# Patient Record
Sex: MALE | Race: WHITE | NOT HISPANIC OR LATINO | Employment: OTHER | URBAN - METROPOLITAN AREA
[De-identification: names, ages, dates, MRNs, and addresses within clinical notes are randomized per-mention and may not be internally consistent; named-entity substitution may affect disease eponyms.]

---

## 2017-01-01 ENCOUNTER — OFFICE VISIT (OUTPATIENT)
Dept: ONCOLOGY | Facility: CLINIC | Age: 82
End: 2017-01-01

## 2017-01-01 ENCOUNTER — INFUSION (OUTPATIENT)
Dept: ONCOLOGY | Facility: HOSPITAL | Age: 82
End: 2017-01-01

## 2017-01-01 ENCOUNTER — DOCUMENTATION (OUTPATIENT)
Dept: RADIATION ONCOLOGY | Facility: HOSPITAL | Age: 82
End: 2017-01-01

## 2017-01-01 ENCOUNTER — APPOINTMENT (OUTPATIENT)
Dept: ONCOLOGY | Facility: CLINIC | Age: 82
End: 2017-01-01

## 2017-01-01 ENCOUNTER — APPOINTMENT (OUTPATIENT)
Dept: GENERAL RADIOLOGY | Facility: HOSPITAL | Age: 82
End: 2017-01-01

## 2017-01-01 ENCOUNTER — RADIATION ONCOLOGY WEEKLY ASSESSMENT (OUTPATIENT)
Dept: RADIATION ONCOLOGY | Facility: HOSPITAL | Age: 82
End: 2017-01-01

## 2017-01-01 ENCOUNTER — HOSPITAL ENCOUNTER (OUTPATIENT)
Dept: MRI IMAGING | Facility: HOSPITAL | Age: 82
Discharge: HOME | End: 2017-05-21
Attending: INTERNAL MEDICINE | Admitting: INTERNAL MEDICINE

## 2017-01-01 ENCOUNTER — HOSPITAL ENCOUNTER (OUTPATIENT)
Dept: GENERAL RADIOLOGY | Facility: HOSPITAL | Age: 82
Discharge: HOME | End: 2017-05-21
Attending: INTERNAL MEDICINE

## 2017-01-01 ENCOUNTER — APPOINTMENT (OUTPATIENT)
Dept: LAB | Facility: HOSPITAL | Age: 82
End: 2017-01-01

## 2017-01-01 ENCOUNTER — APPOINTMENT (OUTPATIENT)
Dept: ONCOLOGY | Facility: HOSPITAL | Age: 82
End: 2017-01-01

## 2017-01-01 ENCOUNTER — APPOINTMENT (OUTPATIENT)
Dept: CT IMAGING | Facility: HOSPITAL | Age: 82
End: 2017-01-01

## 2017-01-01 ENCOUNTER — HOSPITAL ENCOUNTER (OUTPATIENT)
Dept: CT IMAGING | Facility: HOSPITAL | Age: 82
Discharge: HOME | End: 2017-03-17
Attending: INTERNAL MEDICINE

## 2017-01-01 ENCOUNTER — LAB (OUTPATIENT)
Dept: ONCOLOGY | Facility: HOSPITAL | Age: 82
End: 2017-01-01

## 2017-01-01 ENCOUNTER — HOSPITAL ENCOUNTER (OUTPATIENT)
Dept: MRI IMAGING | Facility: HOSPITAL | Age: 82
Discharge: HOME | End: 2017-01-24
Attending: RADIOLOGY | Admitting: RADIOLOGY

## 2017-01-01 ENCOUNTER — HOSPITAL ENCOUNTER (OUTPATIENT)
Dept: PET IMAGING | Facility: HOSPITAL | Age: 82
Discharge: HOME | End: 2017-05-22
Attending: INTERNAL MEDICINE | Admitting: INTERNAL MEDICINE

## 2017-01-01 ENCOUNTER — LAB (OUTPATIENT)
Dept: LAB | Facility: HOSPITAL | Age: 82
End: 2017-01-01

## 2017-01-01 ENCOUNTER — OFFICE VISIT (OUTPATIENT)
Dept: RADIATION ONCOLOGY | Facility: HOSPITAL | Age: 82
End: 2017-01-01

## 2017-01-01 ENCOUNTER — CLINICAL SUPPORT (OUTPATIENT)
Dept: ONCOLOGY | Facility: HOSPITAL | Age: 82
End: 2017-01-01

## 2017-01-01 ENCOUNTER — APPOINTMENT (OUTPATIENT)
Dept: RADIATION ONCOLOGY | Facility: HOSPITAL | Age: 82
End: 2017-01-01

## 2017-01-01 ENCOUNTER — HOSPITAL ENCOUNTER (OUTPATIENT)
Dept: CT IMAGING | Facility: HOSPITAL | Age: 82
Discharge: HOME | End: 2017-01-18
Attending: INTERNAL MEDICINE | Admitting: INTERNAL MEDICINE

## 2017-01-01 ENCOUNTER — HOSPITAL ENCOUNTER (OUTPATIENT)
Dept: MRI IMAGING | Facility: HOSPITAL | Age: 82
Discharge: HOME | End: 2017-03-17
Attending: INTERNAL MEDICINE | Admitting: INTERNAL MEDICINE

## 2017-01-01 ENCOUNTER — HOSPITAL ENCOUNTER (OUTPATIENT)
Dept: MRI IMAGING | Facility: HOSPITAL | Age: 82
Discharge: HOME | End: 2017-01-24
Attending: RADIOLOGY

## 2017-01-01 ENCOUNTER — DOCUMENTATION (OUTPATIENT)
Dept: ONCOLOGY | Facility: CLINIC | Age: 82
End: 2017-01-01

## 2017-01-01 ENCOUNTER — OFFICE VISIT (OUTPATIENT)
Dept: INTERNAL MEDICINE | Facility: CLINIC | Age: 82
End: 2017-01-01

## 2017-01-01 ENCOUNTER — APPOINTMENT (OUTPATIENT)
Dept: OTHER | Facility: HOSPITAL | Age: 82
End: 2017-01-01

## 2017-01-01 ENCOUNTER — HOSPITAL ENCOUNTER (INPATIENT)
Facility: HOSPITAL | Age: 82
LOS: 7 days | End: 2017-06-16
Attending: EMERGENCY MEDICINE | Admitting: INTERNAL MEDICINE

## 2017-01-01 ENCOUNTER — HOSPITAL ENCOUNTER (INPATIENT)
Facility: HOSPITAL | Age: 82
LOS: 4 days | Discharge: HOME | End: 2017-03-23
Attending: EMERGENCY MEDICINE | Admitting: INTERNAL MEDICINE

## 2017-01-01 ENCOUNTER — CONSULT (OUTPATIENT)
Dept: RADIATION ONCOLOGY | Facility: HOSPITAL | Age: 82
End: 2017-01-01

## 2017-01-01 ENCOUNTER — HOSPITAL ENCOUNTER (OUTPATIENT)
Dept: PET IMAGING | Facility: HOSPITAL | Age: 82
Discharge: HOME | End: 2017-05-22
Attending: INTERNAL MEDICINE

## 2017-01-01 ENCOUNTER — TELEPHONE (OUTPATIENT)
Dept: ONCOLOGY | Facility: CLINIC | Age: 82
End: 2017-01-01

## 2017-01-01 VITALS
HEART RATE: 110 BPM | TEMPERATURE: 97.8 F | BODY MASS INDEX: 33.45 KG/M2 | DIASTOLIC BLOOD PRESSURE: 72 MMHG | OXYGEN SATURATION: 92 % | SYSTOLIC BLOOD PRESSURE: 140 MMHG | HEIGHT: 75 IN | WEIGHT: 269 LBS

## 2017-01-01 VITALS
DIASTOLIC BLOOD PRESSURE: 72 MMHG | BODY MASS INDEX: 31.71 KG/M2 | WEIGHT: 255 LBS | HEART RATE: 116 BPM | HEIGHT: 75 IN | OXYGEN SATURATION: 88 % | RESPIRATION RATE: 20 BRPM | SYSTOLIC BLOOD PRESSURE: 140 MMHG | TEMPERATURE: 97.6 F

## 2017-01-01 VITALS
WEIGHT: 274.2 LBS | HEIGHT: 74 IN | BODY MASS INDEX: 35.19 KG/M2 | TEMPERATURE: 98 F | DIASTOLIC BLOOD PRESSURE: 69 MMHG | HEART RATE: 114 BPM | SYSTOLIC BLOOD PRESSURE: 137 MMHG

## 2017-01-01 VITALS
BODY MASS INDEX: 34.96 KG/M2 | SYSTOLIC BLOOD PRESSURE: 128 MMHG | HEART RATE: 82 BPM | RESPIRATION RATE: 16 BRPM | TEMPERATURE: 98.2 F | OXYGEN SATURATION: 96 % | WEIGHT: 272.4 LBS | DIASTOLIC BLOOD PRESSURE: 70 MMHG | HEIGHT: 74 IN

## 2017-01-01 VITALS
BODY MASS INDEX: 32.2 KG/M2 | HEART RATE: 96 BPM | SYSTOLIC BLOOD PRESSURE: 122 MMHG | HEART RATE: 100 BPM | DIASTOLIC BLOOD PRESSURE: 80 MMHG | DIASTOLIC BLOOD PRESSURE: 72 MMHG | SYSTOLIC BLOOD PRESSURE: 137 MMHG | HEIGHT: 75 IN | WEIGHT: 259 LBS | TEMPERATURE: 97.6 F | RESPIRATION RATE: 18 BRPM | TEMPERATURE: 97.7 F | OXYGEN SATURATION: 92 % | WEIGHT: 272.6 LBS | BODY MASS INDEX: 35 KG/M2

## 2017-01-01 VITALS
RESPIRATION RATE: 20 BRPM | OXYGEN SATURATION: 93 % | TEMPERATURE: 97.2 F | HEIGHT: 75 IN | SYSTOLIC BLOOD PRESSURE: 139 MMHG | DIASTOLIC BLOOD PRESSURE: 84 MMHG | WEIGHT: 267 LBS | HEART RATE: 121 BPM | BODY MASS INDEX: 33.2 KG/M2

## 2017-01-01 VITALS
RESPIRATION RATE: 14 BRPM | TEMPERATURE: 97.6 F | HEIGHT: 75 IN | SYSTOLIC BLOOD PRESSURE: 110 MMHG | DIASTOLIC BLOOD PRESSURE: 70 MMHG | OXYGEN SATURATION: 92 % | WEIGHT: 251.2 LBS | BODY MASS INDEX: 31.23 KG/M2 | HEART RATE: 97 BPM

## 2017-01-01 VITALS
DIASTOLIC BLOOD PRESSURE: 66 MMHG | RESPIRATION RATE: 18 BRPM | SYSTOLIC BLOOD PRESSURE: 118 MMHG | HEART RATE: 119 BPM | HEIGHT: 74 IN | WEIGHT: 250.2 LBS | OXYGEN SATURATION: 89 % | BODY MASS INDEX: 32.11 KG/M2 | TEMPERATURE: 97.9 F

## 2017-01-01 VITALS
TEMPERATURE: 98 F | HEIGHT: 74 IN | BODY MASS INDEX: 34.88 KG/M2 | OXYGEN SATURATION: 93 % | HEART RATE: 84 BPM | SYSTOLIC BLOOD PRESSURE: 120 MMHG | WEIGHT: 271.8 LBS | DIASTOLIC BLOOD PRESSURE: 78 MMHG | RESPIRATION RATE: 18 BRPM

## 2017-01-01 VITALS
WEIGHT: 275 LBS | BODY MASS INDEX: 35.31 KG/M2 | OXYGEN SATURATION: 94 % | TEMPERATURE: 96.7 F | DIASTOLIC BLOOD PRESSURE: 85 MMHG | SYSTOLIC BLOOD PRESSURE: 142 MMHG | HEART RATE: 91 BPM | RESPIRATION RATE: 16 BRPM

## 2017-01-01 VITALS
WEIGHT: 270.4 LBS | DIASTOLIC BLOOD PRESSURE: 82 MMHG | RESPIRATION RATE: 18 BRPM | OXYGEN SATURATION: 88 % | BODY MASS INDEX: 33.62 KG/M2 | HEART RATE: 76 BPM | HEIGHT: 75 IN | TEMPERATURE: 98 F | SYSTOLIC BLOOD PRESSURE: 140 MMHG

## 2017-01-01 VITALS
SYSTOLIC BLOOD PRESSURE: 128 MMHG | TEMPERATURE: 97.2 F | HEART RATE: 84 BPM | WEIGHT: 258 LBS | RESPIRATION RATE: 16 BRPM | BODY MASS INDEX: 32.25 KG/M2 | DIASTOLIC BLOOD PRESSURE: 64 MMHG

## 2017-01-01 VITALS
BODY MASS INDEX: 34.92 KG/M2 | DIASTOLIC BLOOD PRESSURE: 85 MMHG | OXYGEN SATURATION: 92 % | RESPIRATION RATE: 16 BRPM | SYSTOLIC BLOOD PRESSURE: 137 MMHG | HEART RATE: 84 BPM | WEIGHT: 272 LBS | TEMPERATURE: 96.6 F

## 2017-01-01 VITALS
DIASTOLIC BLOOD PRESSURE: 102 MMHG | RESPIRATION RATE: 16 BRPM | TEMPERATURE: 96.5 F | HEART RATE: 82 BPM | SYSTOLIC BLOOD PRESSURE: 154 MMHG | OXYGEN SATURATION: 90 %

## 2017-01-01 VITALS
HEART RATE: 110 BPM | BODY MASS INDEX: 35.33 KG/M2 | WEIGHT: 275.2 LBS | TEMPERATURE: 97.7 F | SYSTOLIC BLOOD PRESSURE: 127 MMHG | DIASTOLIC BLOOD PRESSURE: 72 MMHG

## 2017-01-01 VITALS
OXYGEN SATURATION: 97 % | BODY MASS INDEX: 34.25 KG/M2 | WEIGHT: 274 LBS | DIASTOLIC BLOOD PRESSURE: 87 MMHG | HEART RATE: 80 BPM | TEMPERATURE: 97.6 F | SYSTOLIC BLOOD PRESSURE: 136 MMHG

## 2017-01-01 VITALS
OXYGEN SATURATION: 91 % | TEMPERATURE: 96.8 F | SYSTOLIC BLOOD PRESSURE: 151 MMHG | DIASTOLIC BLOOD PRESSURE: 80 MMHG | RESPIRATION RATE: 16 BRPM | HEART RATE: 103 BPM

## 2017-01-01 VITALS
SYSTOLIC BLOOD PRESSURE: 105 MMHG | OXYGEN SATURATION: 94 % | HEART RATE: 94 BPM | DIASTOLIC BLOOD PRESSURE: 70 MMHG | RESPIRATION RATE: 20 BRPM | TEMPERATURE: 97.1 F

## 2017-01-01 VITALS
RESPIRATION RATE: 16 BRPM | SYSTOLIC BLOOD PRESSURE: 110 MMHG | DIASTOLIC BLOOD PRESSURE: 66 MMHG | WEIGHT: 274 LBS | HEIGHT: 75 IN | TEMPERATURE: 97.5 F | HEART RATE: 88 BPM | BODY MASS INDEX: 34.07 KG/M2 | OXYGEN SATURATION: 90 %

## 2017-01-01 VITALS
SYSTOLIC BLOOD PRESSURE: 164 MMHG | RESPIRATION RATE: 16 BRPM | DIASTOLIC BLOOD PRESSURE: 104 MMHG | TEMPERATURE: 96.4 F | HEART RATE: 115 BPM | OXYGEN SATURATION: 91 %

## 2017-01-01 VITALS — WEIGHT: 242 LBS | BODY MASS INDEX: 31.07 KG/M2

## 2017-01-01 DIAGNOSIS — C79.31 BRAIN METASTASIS: ICD-10-CM

## 2017-01-01 DIAGNOSIS — C34.82 MALIGNANT NEOPLASM OF OVERLAPPING SITES OF LEFT LUNG (HCC): Primary | ICD-10-CM

## 2017-01-01 DIAGNOSIS — C34.82 MALIGNANT NEOPLASM OF OVERLAPPING SITES OF LEFT LUNG (HCC): ICD-10-CM

## 2017-01-01 DIAGNOSIS — C79.51 BONE METASTASES: ICD-10-CM

## 2017-01-01 DIAGNOSIS — C79.31 BRAIN METASTASIS: Primary | ICD-10-CM

## 2017-01-01 DIAGNOSIS — N18.30 CHRONIC KIDNEY DISEASE, STAGE III (MODERATE) (HCC): Primary | ICD-10-CM

## 2017-01-01 DIAGNOSIS — C34.90 LUNG CANCER, PRIMARY, WITH METASTASIS FROM LUNG TO OTHER SITE, UNSPECIFIED LATERALITY (HCC): ICD-10-CM

## 2017-01-01 DIAGNOSIS — R26.2 DIFFICULTY WALKING: ICD-10-CM

## 2017-01-01 DIAGNOSIS — J96.02 ACUTE RESPIRATORY FAILURE WITH HYPOXIA AND HYPERCAPNIA (HCC): Primary | ICD-10-CM

## 2017-01-01 DIAGNOSIS — A41.9 SEPSIS, DUE TO UNSPECIFIED ORGANISM: Primary | ICD-10-CM

## 2017-01-01 DIAGNOSIS — C79.51 BONE METASTASES: Primary | ICD-10-CM

## 2017-01-01 DIAGNOSIS — J69.0 ASPIRATION PNEUMONIA OF RIGHT LUNG, UNSPECIFIED ASPIRATION PNEUMONIA TYPE, UNSPECIFIED PART OF LUNG (HCC): ICD-10-CM

## 2017-01-01 DIAGNOSIS — I51.3 LEFT ATRIAL THROMBUS: ICD-10-CM

## 2017-01-01 DIAGNOSIS — T45.1X5A CHEMOTHERAPY INDUCED NEUTROPENIA (HCC): ICD-10-CM

## 2017-01-01 DIAGNOSIS — R13.12 OROPHARYNGEAL DYSPHAGIA: ICD-10-CM

## 2017-01-01 DIAGNOSIS — D64.9 ANEMIA, UNSPECIFIED TYPE: ICD-10-CM

## 2017-01-01 DIAGNOSIS — Z45.2 FITTING AND ADJUSTMENT OF VASCULAR CATHETER: ICD-10-CM

## 2017-01-01 DIAGNOSIS — D70.1 CHEMOTHERAPY INDUCED NEUTROPENIA (HCC): ICD-10-CM

## 2017-01-01 DIAGNOSIS — R06.89 DIFFICULTY BREATHING: ICD-10-CM

## 2017-01-01 DIAGNOSIS — C64.1 RENAL CELL CARCINOMA OF RIGHT KIDNEY (HCC): ICD-10-CM

## 2017-01-01 DIAGNOSIS — J69.0 ASPIRATION PNEUMONIA OF BOTH LUNGS, UNSPECIFIED ASPIRATION PNEUMONIA TYPE, UNSPECIFIED PART OF LUNG (HCC): ICD-10-CM

## 2017-01-01 DIAGNOSIS — J18.9 PNEUMONIA OF LEFT LOWER LOBE DUE TO INFECTIOUS ORGANISM: ICD-10-CM

## 2017-01-01 DIAGNOSIS — N18.30 CHRONIC KIDNEY DISEASE, STAGE III (MODERATE) (HCC): ICD-10-CM

## 2017-01-01 DIAGNOSIS — R94.6 ABNORMAL RESULTS OF THYROID FUNCTION STUDIES: ICD-10-CM

## 2017-01-01 DIAGNOSIS — J96.01 ACUTE RESPIRATORY FAILURE WITH HYPOXIA AND HYPERCAPNIA (HCC): Primary | ICD-10-CM

## 2017-01-01 DIAGNOSIS — R09.02 HYPOXEMIA: Primary | ICD-10-CM

## 2017-01-01 DIAGNOSIS — I48.20 CHRONIC ATRIAL FIBRILLATION (HCC): ICD-10-CM

## 2017-01-01 DIAGNOSIS — J18.9 PNEUMONIA OF BOTH LUNGS DUE TO INFECTIOUS ORGANISM, UNSPECIFIED PART OF LUNG: ICD-10-CM

## 2017-01-01 DIAGNOSIS — C64.1 RENAL CELL CARCINOMA OF RIGHT KIDNEY (HCC): Primary | ICD-10-CM

## 2017-01-01 DIAGNOSIS — R06.89 DIFFICULTY BREATHING: Primary | ICD-10-CM

## 2017-01-01 LAB
ALBUMIN SERPL-MCNC: 3.4 G/DL (ref 3.5–5.2)
ALBUMIN SERPL-MCNC: 3.5 G/DL (ref 3.5–5.2)
ALBUMIN SERPL-MCNC: 3.5 G/DL (ref 3.5–5.2)
ALBUMIN SERPL-MCNC: 3.6 G/DL (ref 3.5–5.2)
ALBUMIN SERPL-MCNC: 3.7 G/DL (ref 3.5–5.2)
ALBUMIN SERPL-MCNC: 3.8 G/DL (ref 3.5–5.2)
ALBUMIN SERPL-MCNC: 3.9 G/DL (ref 3.5–5.2)
ALBUMIN SERPL-MCNC: 4 G/DL (ref 3.5–5.2)
ALBUMIN SERPL-MCNC: 4.1 G/DL (ref 3.5–5.2)
ALBUMIN/GLOB SERPL: 0.9 G/DL
ALBUMIN/GLOB SERPL: 0.9 G/DL (ref 1.1–2.4)
ALBUMIN/GLOB SERPL: 0.9 G/DL (ref 1.1–2.4)
ALBUMIN/GLOB SERPL: 1 G/DL (ref 1.1–2.4)
ALBUMIN/GLOB SERPL: 1.1 G/DL
ALBUMIN/GLOB SERPL: 1.1 G/DL (ref 1.1–2.4)
ALBUMIN/GLOB SERPL: 1.2 G/DL (ref 1.1–2.4)
ALBUMIN/GLOB SERPL: 1.3 G/DL (ref 1.1–2.4)
ALBUMIN/GLOB SERPL: 1.3 G/DL (ref 1.1–2.4)
ALP SERPL-CCNC: 100 U/L (ref 38–116)
ALP SERPL-CCNC: 101 U/L (ref 38–116)
ALP SERPL-CCNC: 105 U/L (ref 38–116)
ALP SERPL-CCNC: 111 U/L (ref 38–116)
ALP SERPL-CCNC: 115 U/L (ref 38–116)
ALP SERPL-CCNC: 115 U/L (ref 38–116)
ALP SERPL-CCNC: 130 U/L (ref 38–116)
ALP SERPL-CCNC: 140 U/L (ref 38–116)
ALP SERPL-CCNC: 140 U/L (ref 39–117)
ALP SERPL-CCNC: 154 U/L (ref 38–116)
ALP SERPL-CCNC: 160 U/L (ref 38–116)
ALP SERPL-CCNC: 167 U/L (ref 39–117)
ALP SERPL-CCNC: 179 U/L (ref 38–116)
ALT SERPL W P-5'-P-CCNC: 10 U/L (ref 0–41)
ALT SERPL W P-5'-P-CCNC: 11 U/L (ref 0–41)
ALT SERPL W P-5'-P-CCNC: 12 U/L (ref 0–41)
ALT SERPL W P-5'-P-CCNC: 12 U/L (ref 1–41)
ALT SERPL W P-5'-P-CCNC: 13 U/L (ref 0–41)
ALT SERPL W P-5'-P-CCNC: 13 U/L (ref 0–41)
ALT SERPL W P-5'-P-CCNC: 14 U/L (ref 0–41)
ALT SERPL W P-5'-P-CCNC: 14 U/L (ref 0–41)
ALT SERPL W P-5'-P-CCNC: 19 U/L (ref 0–41)
ALT SERPL W P-5'-P-CCNC: 24 U/L (ref 1–41)
ALT SERPL W P-5'-P-CCNC: 9 U/L (ref 0–41)
ALT SERPL W P-5'-P-CCNC: 9 U/L (ref 0–41)
ANION GAP SERPL CALCULATED.3IONS-SCNC: 10 MMOL/L
ANION GAP SERPL CALCULATED.3IONS-SCNC: 10.5 MMOL/L
ANION GAP SERPL CALCULATED.3IONS-SCNC: 10.9 MMOL/L
ANION GAP SERPL CALCULATED.3IONS-SCNC: 11 MMOL/L
ANION GAP SERPL CALCULATED.3IONS-SCNC: 11.1 MMOL/L
ANION GAP SERPL CALCULATED.3IONS-SCNC: 12 MMOL/L
ANION GAP SERPL CALCULATED.3IONS-SCNC: 12.9 MMOL/L
ANION GAP SERPL CALCULATED.3IONS-SCNC: 4.5 MMOL/L
ANION GAP SERPL CALCULATED.3IONS-SCNC: 6.1 MMOL/L
ANION GAP SERPL CALCULATED.3IONS-SCNC: 6.1 MMOL/L
ANION GAP SERPL CALCULATED.3IONS-SCNC: 7.1 MMOL/L
ANION GAP SERPL CALCULATED.3IONS-SCNC: 8.2 MMOL/L
ANION GAP SERPL CALCULATED.3IONS-SCNC: 8.7 MMOL/L
ANION GAP SERPL CALCULATED.3IONS-SCNC: 8.7 MMOL/L
ANION GAP SERPL CALCULATED.3IONS-SCNC: 8.8 MMOL/L
ANION GAP SERPL CALCULATED.3IONS-SCNC: 9 MMOL/L
ANION GAP SERPL CALCULATED.3IONS-SCNC: 9.1 MMOL/L
ANION GAP SERPL CALCULATED.3IONS-SCNC: 9.3 MMOL/L
ANION GAP SERPL CALCULATED.3IONS-SCNC: 9.6 MMOL/L
ARTERIAL PATENCY WRIST A: ABNORMAL
ARTERIAL PATENCY WRIST A: POSITIVE
AST SERPL-CCNC: 17 U/L (ref 0–40)
AST SERPL-CCNC: 18 U/L (ref 0–40)
AST SERPL-CCNC: 19 U/L (ref 0–40)
AST SERPL-CCNC: 19 U/L (ref 0–40)
AST SERPL-CCNC: 21 U/L (ref 1–40)
AST SERPL-CCNC: 22 U/L (ref 1–40)
AST SERPL-CCNC: 24 U/L (ref 0–40)
AST SERPL-CCNC: 42 U/L (ref 0–40)
ATMOSPHERIC PRESS: 747.3 MMHG
ATMOSPHERIC PRESS: 747.6 MMHG
ATMOSPHERIC PRESS: 748.5 MMHG
ATMOSPHERIC PRESS: 752.6 MMHG
ATMOSPHERIC PRESS: 754.9 MMHG
ATMOSPHERIC PRESS: 755.5 MMHG
ATMOSPHERIC PRESS: 756.5 MMHG
BACTERIA SPEC AEROBE CULT: NO GROWTH
BACTERIA SPEC AEROBE CULT: NORMAL
BACTERIA UR QL AUTO: ABNORMAL /HPF
BASE EXCESS BLDA CALC-SCNC: 10.2 MMOL/L (ref 0–2)
BASE EXCESS BLDA CALC-SCNC: 10.2 MMOL/L (ref 0–2)
BASE EXCESS BLDA CALC-SCNC: 10.5 MMOL/L (ref 0–2)
BASE EXCESS BLDA CALC-SCNC: 11.9 MMOL/L (ref 0–2)
BASE EXCESS BLDA CALC-SCNC: 12 MMOL/L (ref 0–2)
BASE EXCESS BLDA CALC-SCNC: 6.1 MMOL/L (ref 0–2)
BASE EXCESS BLDA CALC-SCNC: 7.7 MMOL/L (ref 0–2)
BASOPHILS # BLD AUTO: 0.01 10*3/MM3 (ref 0–0.1)
BASOPHILS # BLD AUTO: 0.02 10*3/MM3 (ref 0–0.2)
BASOPHILS # BLD AUTO: 0.02 10*3/MM3 (ref 0–0.2)
BASOPHILS # BLD AUTO: 0.03 10*3/MM3 (ref 0–0.1)
BASOPHILS # BLD AUTO: 0.03 10*3/MM3 (ref 0–0.1)
BASOPHILS # BLD AUTO: 0.03 10*3/MM3 (ref 0–0.2)
BASOPHILS # BLD AUTO: 0.04 10*3/MM3 (ref 0–0.1)
BASOPHILS # BLD AUTO: 0.05 10*3/MM3 (ref 0–0.1)
BASOPHILS # BLD AUTO: 0.05 10*3/MM3 (ref 0–0.2)
BASOPHILS # BLD AUTO: 0.06 10*3/MM3 (ref 0–0.1)
BASOPHILS # BLD AUTO: 0.06 10*3/MM3 (ref 0–0.1)
BASOPHILS # BLD AUTO: 0.07 10*3/MM3 (ref 0–0.1)
BASOPHILS # BLD AUTO: 0.08 10*3/MM3 (ref 0–0.1)
BASOPHILS NFR BLD AUTO: 0.2 % (ref 0–1.5)
BASOPHILS NFR BLD AUTO: 0.4 % (ref 0–1.1)
BASOPHILS NFR BLD AUTO: 0.4 % (ref 0–1.1)
BASOPHILS NFR BLD AUTO: 0.5 % (ref 0–1.1)
BASOPHILS NFR BLD AUTO: 0.5 % (ref 0–1.5)
BASOPHILS NFR BLD AUTO: 0.6 % (ref 0–1.1)
BASOPHILS NFR BLD AUTO: 0.6 % (ref 0–1.1)
BASOPHILS NFR BLD AUTO: 0.7 % (ref 0–1.1)
BASOPHILS NFR BLD AUTO: 0.7 % (ref 0–1.1)
BASOPHILS NFR BLD AUTO: 0.8 % (ref 0–1.1)
BASOPHILS NFR BLD AUTO: 0.9 % (ref 0–1.1)
BASOPHILS NFR BLD AUTO: 1 % (ref 0–1.1)
BDY SITE: ABNORMAL
BILIRUB SERPL-MCNC: 0.4 MG/DL (ref 0.1–1.2)
BILIRUB SERPL-MCNC: 0.5 MG/DL (ref 0.1–1.2)
BILIRUB SERPL-MCNC: 0.6 MG/DL (ref 0.1–1.2)
BILIRUB SERPL-MCNC: 0.7 MG/DL (ref 0.1–1.2)
BILIRUB SERPL-MCNC: 0.8 MG/DL (ref 0.1–1.2)
BILIRUB SERPL-MCNC: 0.8 MG/DL (ref 0.1–1.2)
BILIRUB UR QL STRIP: NEGATIVE
BUN BLD-MCNC: 15 MG/DL (ref 8–23)
BUN BLD-MCNC: 16 MG/DL (ref 8–23)
BUN BLD-MCNC: 20 MG/DL (ref 6–20)
BUN BLD-MCNC: 21 MG/DL (ref 8–23)
BUN BLD-MCNC: 22 MG/DL (ref 6–20)
BUN BLD-MCNC: 23 MG/DL (ref 6–20)
BUN BLD-MCNC: 23 MG/DL (ref 6–20)
BUN BLD-MCNC: 23 MG/DL (ref 8–23)
BUN BLD-MCNC: 26 MG/DL (ref 8–23)
BUN BLD-MCNC: 27 MG/DL (ref 6–20)
BUN BLD-MCNC: 27 MG/DL (ref 8–23)
BUN BLD-MCNC: 28 MG/DL (ref 6–20)
BUN BLD-MCNC: 28 MG/DL (ref 6–20)
BUN BLD-MCNC: 29 MG/DL (ref 6–20)
BUN BLD-MCNC: 29 MG/DL (ref 6–20)
BUN BLD-MCNC: 29 MG/DL (ref 8–23)
BUN BLD-MCNC: 30 MG/DL (ref 6–20)
BUN BLD-MCNC: 31 MG/DL (ref 6–20)
BUN BLD-MCNC: 31 MG/DL (ref 8–23)
BUN BLD-MCNC: 32 MG/DL (ref 6–20)
BUN BLD-MCNC: 34 MG/DL (ref 6–20)
BUN/CREAT SERPL: 11.3 (ref 7–25)
BUN/CREAT SERPL: 11.4 (ref 7.3–30)
BUN/CREAT SERPL: 12 (ref 7–25)
BUN/CREAT SERPL: 14.2 (ref 7–25)
BUN/CREAT SERPL: 14.4 (ref 7.3–30)
BUN/CREAT SERPL: 14.4 (ref 7.3–30)
BUN/CREAT SERPL: 14.9 (ref 7.3–30)
BUN/CREAT SERPL: 15.4 (ref 7–25)
BUN/CREAT SERPL: 15.7 (ref 7.3–30)
BUN/CREAT SERPL: 15.8 (ref 7–25)
BUN/CREAT SERPL: 16.1 (ref 7.3–30)
BUN/CREAT SERPL: 16.3 (ref 7.3–30)
BUN/CREAT SERPL: 16.6 (ref 7.3–30)
BUN/CREAT SERPL: 16.8 (ref 7.3–30)
BUN/CREAT SERPL: 17.2 (ref 7–25)
BUN/CREAT SERPL: 18.6 (ref 7.3–30)
BUN/CREAT SERPL: 18.6 (ref 7–25)
BUN/CREAT SERPL: 20.1 (ref 7.3–30)
BUN/CREAT SERPL: 20.5 (ref 7–25)
BUN/CREAT SERPL: 20.8 (ref 7.3–30)
BUN/CREAT SERPL: 22.1 (ref 7.3–30)
CALCIUM SPEC-SCNC: 10 MG/DL (ref 8.5–10.2)
CALCIUM SPEC-SCNC: 10.1 MG/DL (ref 8.5–10.2)
CALCIUM SPEC-SCNC: 10.2 MG/DL (ref 8.5–10.2)
CALCIUM SPEC-SCNC: 6.9 MG/DL (ref 8.6–10.5)
CALCIUM SPEC-SCNC: 7.3 MG/DL (ref 8.6–10.5)
CALCIUM SPEC-SCNC: 7.4 MG/DL (ref 8.6–10.5)
CALCIUM SPEC-SCNC: 7.4 MG/DL (ref 8.6–10.5)
CALCIUM SPEC-SCNC: 7.7 MG/DL (ref 8.6–10.5)
CALCIUM SPEC-SCNC: 8.3 MG/DL (ref 8.5–10.2)
CALCIUM SPEC-SCNC: 8.9 MG/DL (ref 8.6–10.5)
CALCIUM SPEC-SCNC: 9.3 MG/DL (ref 8.6–10.5)
CALCIUM SPEC-SCNC: 9.4 MG/DL (ref 8.5–10.2)
CALCIUM SPEC-SCNC: 9.5 MG/DL (ref 8.5–10.2)
CALCIUM SPEC-SCNC: 9.5 MG/DL (ref 8.5–10.2)
CALCIUM SPEC-SCNC: 9.7 MG/DL (ref 8.5–10.2)
CALCIUM SPEC-SCNC: 9.9 MG/DL (ref 8.5–10.2)
CALCIUM SPEC-SCNC: 9.9 MG/DL (ref 8.6–10.5)
CHLORIDE SERPL-SCNC: 100 MMOL/L (ref 98–107)
CHLORIDE SERPL-SCNC: 90 MMOL/L (ref 98–107)
CHLORIDE SERPL-SCNC: 92 MMOL/L (ref 98–107)
CHLORIDE SERPL-SCNC: 93 MMOL/L (ref 98–107)
CHLORIDE SERPL-SCNC: 93 MMOL/L (ref 98–107)
CHLORIDE SERPL-SCNC: 95 MMOL/L (ref 98–107)
CHLORIDE SERPL-SCNC: 96 MMOL/L (ref 98–107)
CHLORIDE SERPL-SCNC: 97 MMOL/L (ref 98–107)
CHLORIDE SERPL-SCNC: 98 MMOL/L (ref 98–107)
CHLORIDE SERPL-SCNC: 98 MMOL/L (ref 98–107)
CHLORIDE SERPL-SCNC: 99 MMOL/L (ref 98–107)
CHLORIDE SERPL-SCNC: 99 MMOL/L (ref 98–107)
CLARITY UR: ABNORMAL
CO2 SERPL-SCNC: 26.1 MMOL/L (ref 22–29)
CO2 SERPL-SCNC: 30.3 MMOL/L (ref 22–29)
CO2 SERPL-SCNC: 31 MMOL/L (ref 22–29)
CO2 SERPL-SCNC: 32 MMOL/L (ref 22–29)
CO2 SERPL-SCNC: 32.1 MMOL/L (ref 22–29)
CO2 SERPL-SCNC: 32.9 MMOL/L (ref 22–29)
CO2 SERPL-SCNC: 33.3 MMOL/L (ref 22–29)
CO2 SERPL-SCNC: 33.5 MMOL/L (ref 22–29)
CO2 SERPL-SCNC: 33.5 MMOL/L (ref 22–29)
CO2 SERPL-SCNC: 33.9 MMOL/L (ref 22–29)
CO2 SERPL-SCNC: 33.9 MMOL/L (ref 22–29)
CO2 SERPL-SCNC: 34.4 MMOL/L (ref 22–29)
CO2 SERPL-SCNC: 34.5 MMOL/L (ref 22–29)
CO2 SERPL-SCNC: 35 MMOL/L (ref 22–29)
CO2 SERPL-SCNC: 35 MMOL/L (ref 22–29)
CO2 SERPL-SCNC: 35.7 MMOL/L (ref 22–29)
CO2 SERPL-SCNC: 36.2 MMOL/L (ref 22–29)
CO2 SERPL-SCNC: 36.8 MMOL/L (ref 22–29)
CO2 SERPL-SCNC: 36.9 MMOL/L (ref 22–29)
CO2 SERPL-SCNC: 37.5 MMOL/L (ref 22–29)
CO2 SERPL-SCNC: 39.9 MMOL/L (ref 22–29)
COLOR UR: YELLOW
CREAT BLD-MCNC: 1.25 MG/DL (ref 0.76–1.27)
CREAT BLD-MCNC: 1.37 MG/DL (ref 0.7–1.3)
CREAT BLD-MCNC: 1.42 MG/DL (ref 0.76–1.27)
CREAT BLD-MCNC: 1.45 MG/DL (ref 0.7–1.3)
CREAT BLD-MCNC: 1.48 MG/DL (ref 0.76–1.27)
CREAT BLD-MCNC: 1.49 MG/DL (ref 0.76–1.27)
CREAT BLD-MCNC: 1.49 MG/DL (ref 0.7–1.3)
CREAT BLD-MCNC: 1.51 MG/DL (ref 0.76–1.27)
CREAT BLD-MCNC: 1.51 MG/DL (ref 0.76–1.27)
CREAT BLD-MCNC: 1.54 MG/DL (ref 0.7–1.3)
CREAT BLD-MCNC: 1.56 MG/DL (ref 0.76–1.27)
CREAT BLD-MCNC: 1.56 MG/DL (ref 0.7–1.3)
CREAT BLD-MCNC: 1.6 MG/DL (ref 0.7–1.3)
CREAT BLD-MCNC: 1.69 MG/DL (ref 0.7–1.3)
CREAT BLD-MCNC: 1.71 MG/DL (ref 0.76–1.27)
CREAT BLD-MCNC: 1.72 MG/DL (ref 0.7–1.3)
CREAT BLD-MCNC: 1.73 MG/DL (ref 0.7–1.3)
CREAT BLD-MCNC: 1.76 MG/DL (ref 0.7–1.3)
CREAT BLD-MCNC: 1.78 MG/DL (ref 0.7–1.3)
CREAT BLD-MCNC: 1.81 MG/DL (ref 0.7–1.3)
CREAT BLD-MCNC: 1.88 MG/DL (ref 0.7–1.3)
CREAT BLDA-MCNC: 1.6 MG/DL (ref 0.6–1.3)
CREAT BLDA-MCNC: 1.8 MG/DL (ref 0.6–1.3)
CREAT BLDA-MCNC: 1.8 MG/DL (ref 0.6–1.3)
D-LACTATE SERPL-SCNC: 1.3 MMOL/L (ref 0.5–2)
D-LACTATE SERPL-SCNC: 1.4 MMOL/L (ref 0.5–2)
D-LACTATE SERPL-SCNC: 2.7 MMOL/L (ref 0.5–2)
DEPRECATED RDW RBC AUTO: 50.5 FL (ref 37–49)
DEPRECATED RDW RBC AUTO: 52.8 FL (ref 37–49)
DEPRECATED RDW RBC AUTO: 53.5 FL (ref 37–49)
DEPRECATED RDW RBC AUTO: 53.6 FL (ref 37–49)
DEPRECATED RDW RBC AUTO: 54.3 FL (ref 37–49)
DEPRECATED RDW RBC AUTO: 55.6 FL (ref 37–49)
DEPRECATED RDW RBC AUTO: 55.7 FL (ref 37–49)
DEPRECATED RDW RBC AUTO: 56 FL (ref 37–49)
DEPRECATED RDW RBC AUTO: 57.3 FL (ref 37–49)
DEPRECATED RDW RBC AUTO: 57.6 FL (ref 37–54)
DEPRECATED RDW RBC AUTO: 58.1 FL (ref 37–54)
DEPRECATED RDW RBC AUTO: 58.6 FL (ref 37–54)
DEPRECATED RDW RBC AUTO: 58.6 FL (ref 37–54)
DEPRECATED RDW RBC AUTO: 58.7 FL (ref 37–49)
DEPRECATED RDW RBC AUTO: 59.6 FL (ref 37–49)
DEPRECATED RDW RBC AUTO: 60.5 FL (ref 37–49)
DEPRECATED RDW RBC AUTO: 61.4 FL (ref 37–49)
DEPRECATED RDW RBC AUTO: 64 FL (ref 37–54)
DEPRECATED RDW RBC AUTO: 64.8 FL (ref 37–54)
DEPRECATED RDW RBC AUTO: 65.3 FL (ref 37–54)
EOSINOPHIL # BLD AUTO: 0.04 10*3/MM3 (ref 0–0.7)
EOSINOPHIL # BLD AUTO: 0.08 10*3/MM3 (ref 0–0.7)
EOSINOPHIL # BLD AUTO: 0.09 10*3/MM3 (ref 0–0.36)
EOSINOPHIL # BLD AUTO: 0.09 10*3/MM3 (ref 0–0.7)
EOSINOPHIL # BLD AUTO: 0.15 10*3/MM3 (ref 0–0.36)
EOSINOPHIL # BLD AUTO: 0.16 10*3/MM3 (ref 0–0.36)
EOSINOPHIL # BLD AUTO: 0.17 10*3/MM3 (ref 0–0.36)
EOSINOPHIL # BLD AUTO: 0.19 10*3/MM3 (ref 0–0.36)
EOSINOPHIL # BLD AUTO: 0.2 10*3/MM3 (ref 0–0.7)
EOSINOPHIL # BLD AUTO: 0.23 10*3/MM3 (ref 0–0.36)
EOSINOPHIL # BLD AUTO: 0.24 10*3/MM3 (ref 0–0.36)
EOSINOPHIL # BLD AUTO: 0.25 10*3/MM3 (ref 0–0.36)
EOSINOPHIL # BLD AUTO: 0.26 10*3/MM3 (ref 0–0.36)
EOSINOPHIL # BLD AUTO: 0.31 10*3/MM3 (ref 0–0.36)
EOSINOPHIL # BLD AUTO: 0.31 10*3/MM3 (ref 0–0.36)
EOSINOPHIL # BLD AUTO: 0.33 10*3/MM3 (ref 0–0.36)
EOSINOPHIL # BLD AUTO: 0.54 10*3/MM3 (ref 0–0.36)
EOSINOPHIL NFR BLD AUTO: 0.2 % (ref 0.3–6.2)
EOSINOPHIL NFR BLD AUTO: 0.6 % (ref 0.3–6.2)
EOSINOPHIL NFR BLD AUTO: 0.9 % (ref 0.3–6.2)
EOSINOPHIL NFR BLD AUTO: 1.2 % (ref 1–5)
EOSINOPHIL NFR BLD AUTO: 1.6 % (ref 1–5)
EOSINOPHIL NFR BLD AUTO: 1.7 % (ref 1–5)
EOSINOPHIL NFR BLD AUTO: 11.2 % (ref 1–5)
EOSINOPHIL NFR BLD AUTO: 2.1 % (ref 1–5)
EOSINOPHIL NFR BLD AUTO: 2.4 % (ref 0.3–6.2)
EOSINOPHIL NFR BLD AUTO: 2.5 % (ref 1–5)
EOSINOPHIL NFR BLD AUTO: 2.7 % (ref 1–5)
EOSINOPHIL NFR BLD AUTO: 3.3 % (ref 1–5)
EOSINOPHIL NFR BLD AUTO: 3.8 % (ref 1–5)
EOSINOPHIL NFR BLD AUTO: 3.9 % (ref 1–5)
EOSINOPHIL NFR BLD AUTO: 4.1 % (ref 1–5)
EOSINOPHIL NFR BLD AUTO: 4.5 % (ref 1–5)
EOSINOPHIL NFR BLD AUTO: 6.6 % (ref 1–5)
ERYTHROCYTE [DISTWIDTH] IN BLOOD BY AUTOMATED COUNT: 15 % (ref 11.7–14.5)
ERYTHROCYTE [DISTWIDTH] IN BLOOD BY AUTOMATED COUNT: 15.5 % (ref 11.7–14.5)
ERYTHROCYTE [DISTWIDTH] IN BLOOD BY AUTOMATED COUNT: 15.6 % (ref 11.7–14.5)
ERYTHROCYTE [DISTWIDTH] IN BLOOD BY AUTOMATED COUNT: 15.8 % (ref 11.7–14.5)
ERYTHROCYTE [DISTWIDTH] IN BLOOD BY AUTOMATED COUNT: 15.9 % (ref 11.7–14.5)
ERYTHROCYTE [DISTWIDTH] IN BLOOD BY AUTOMATED COUNT: 16.5 % (ref 11.7–14.5)
ERYTHROCYTE [DISTWIDTH] IN BLOOD BY AUTOMATED COUNT: 16.7 % (ref 11.5–14.5)
ERYTHROCYTE [DISTWIDTH] IN BLOOD BY AUTOMATED COUNT: 16.7 % (ref 11.5–14.5)
ERYTHROCYTE [DISTWIDTH] IN BLOOD BY AUTOMATED COUNT: 16.8 % (ref 11.5–14.5)
ERYTHROCYTE [DISTWIDTH] IN BLOOD BY AUTOMATED COUNT: 16.9 % (ref 11.5–14.5)
ERYTHROCYTE [DISTWIDTH] IN BLOOD BY AUTOMATED COUNT: 17 % (ref 11.7–14.5)
ERYTHROCYTE [DISTWIDTH] IN BLOOD BY AUTOMATED COUNT: 17.1 % (ref 11.7–14.5)
ERYTHROCYTE [DISTWIDTH] IN BLOOD BY AUTOMATED COUNT: 17.2 % (ref 11.7–14.5)
ERYTHROCYTE [DISTWIDTH] IN BLOOD BY AUTOMATED COUNT: 17.6 % (ref 11.7–14.5)
ERYTHROCYTE [DISTWIDTH] IN BLOOD BY AUTOMATED COUNT: 17.6 % (ref 11.7–14.5)
ERYTHROCYTE [DISTWIDTH] IN BLOOD BY AUTOMATED COUNT: 17.7 % (ref 11.5–14.5)
ERYTHROCYTE [DISTWIDTH] IN BLOOD BY AUTOMATED COUNT: 17.9 % (ref 11.5–14.5)
ERYTHROCYTE [DISTWIDTH] IN BLOOD BY AUTOMATED COUNT: 18.1 % (ref 11.5–14.5)
FERRITIN SERPL-MCNC: 659.4 NG/ML (ref 30–400)
FOLATE SERPL-MCNC: >20 NG/ML (ref 4.78–24.2)
GAS FLOW AIRWAY: 11 LPM
GAS FLOW AIRWAY: 8 LPM
GAS FLOW AIRWAY: 8 LPM
GFR SERPL CREATININE-BSD FRML MDRD: 35 ML/MIN/1.73
GFR SERPL CREATININE-BSD FRML MDRD: 36 ML/MIN/1.73
GFR SERPL CREATININE-BSD FRML MDRD: 37 ML/MIN/1.73
GFR SERPL CREATININE-BSD FRML MDRD: 38 ML/MIN/1.73
GFR SERPL CREATININE-BSD FRML MDRD: 38 ML/MIN/1.73
GFR SERPL CREATININE-BSD FRML MDRD: 39 ML/MIN/1.73
GFR SERPL CREATININE-BSD FRML MDRD: 42 ML/MIN/1.73
GFR SERPL CREATININE-BSD FRML MDRD: 43 ML/MIN/1.73
GFR SERPL CREATININE-BSD FRML MDRD: 43 ML/MIN/1.73
GFR SERPL CREATININE-BSD FRML MDRD: 44 ML/MIN/1.73
GFR SERPL CREATININE-BSD FRML MDRD: 45 ML/MIN/1.73
GFR SERPL CREATININE-BSD FRML MDRD: 46 ML/MIN/1.73
GFR SERPL CREATININE-BSD FRML MDRD: 47 ML/MIN/1.73
GFR SERPL CREATININE-BSD FRML MDRD: 48 ML/MIN/1.73
GFR SERPL CREATININE-BSD FRML MDRD: 50 ML/MIN/1.73
GFR SERPL CREATININE-BSD FRML MDRD: 56 ML/MIN/1.73
GLOBULIN UR ELPH-MCNC: 3.1 GM/DL (ref 1.8–3.5)
GLOBULIN UR ELPH-MCNC: 3.1 GM/DL (ref 1.8–3.5)
GLOBULIN UR ELPH-MCNC: 3.3 GM/DL (ref 1.8–3.5)
GLOBULIN UR ELPH-MCNC: 3.5 GM/DL (ref 1.8–3.5)
GLOBULIN UR ELPH-MCNC: 3.6 GM/DL (ref 1.8–3.5)
GLOBULIN UR ELPH-MCNC: 3.7 GM/DL
GLOBULIN UR ELPH-MCNC: 3.7 GM/DL (ref 1.8–3.5)
GLOBULIN UR ELPH-MCNC: 3.7 GM/DL (ref 1.8–3.5)
GLOBULIN UR ELPH-MCNC: 3.8 GM/DL
GLOBULIN UR ELPH-MCNC: 3.8 GM/DL (ref 1.8–3.5)
GLOBULIN UR ELPH-MCNC: 3.8 GM/DL (ref 1.8–3.5)
GLOBULIN UR ELPH-MCNC: 4 GM/DL (ref 1.8–3.5)
GLOBULIN UR ELPH-MCNC: 4 GM/DL (ref 1.8–3.5)
GLUCOSE BLD-MCNC: 104 MG/DL (ref 65–99)
GLUCOSE BLD-MCNC: 106 MG/DL (ref 65–99)
GLUCOSE BLD-MCNC: 106 MG/DL (ref 65–99)
GLUCOSE BLD-MCNC: 107 MG/DL (ref 74–124)
GLUCOSE BLD-MCNC: 115 MG/DL (ref 65–99)
GLUCOSE BLD-MCNC: 118 MG/DL (ref 65–99)
GLUCOSE BLD-MCNC: 119 MG/DL (ref 65–99)
GLUCOSE BLD-MCNC: 119 MG/DL (ref 74–124)
GLUCOSE BLD-MCNC: 120 MG/DL (ref 74–124)
GLUCOSE BLD-MCNC: 126 MG/DL (ref 74–124)
GLUCOSE BLD-MCNC: 128 MG/DL (ref 74–124)
GLUCOSE BLD-MCNC: 130 MG/DL (ref 74–124)
GLUCOSE BLD-MCNC: 131 MG/DL (ref 74–124)
GLUCOSE BLD-MCNC: 143 MG/DL (ref 74–124)
GLUCOSE BLD-MCNC: 164 MG/DL (ref 74–124)
GLUCOSE BLD-MCNC: 165 MG/DL (ref 74–124)
GLUCOSE BLD-MCNC: 170 MG/DL (ref 74–124)
GLUCOSE BLD-MCNC: 177 MG/DL (ref 74–124)
GLUCOSE BLD-MCNC: 189 MG/DL (ref 65–99)
GLUCOSE BLD-MCNC: 95 MG/DL (ref 74–124)
GLUCOSE BLD-MCNC: 99 MG/DL (ref 65–99)
GLUCOSE BLDC GLUCOMTR-MCNC: 110 MG/DL (ref 70–130)
GLUCOSE BLDC GLUCOMTR-MCNC: 113 MG/DL (ref 70–130)
GLUCOSE BLDC GLUCOMTR-MCNC: 113 MG/DL (ref 70–130)
GLUCOSE BLDC GLUCOMTR-MCNC: 129 MG/DL (ref 70–130)
GLUCOSE UR STRIP-MCNC: NEGATIVE MG/DL
HCO3 BLDA-SCNC: 34.9 MMOL/L (ref 22–28)
HCO3 BLDA-SCNC: 35.6 MMOL/L (ref 22–28)
HCO3 BLDA-SCNC: 36.8 MMOL/L (ref 22–28)
HCO3 BLDA-SCNC: 38.2 MMOL/L (ref 22–28)
HCO3 BLDA-SCNC: 39.6 MMOL/L (ref 22–28)
HCO3 BLDA-SCNC: 40.4 MMOL/L (ref 22–28)
HCO3 BLDA-SCNC: 41 MMOL/L (ref 22–28)
HCT VFR BLD AUTO: 30.1 % (ref 40.4–52.2)
HCT VFR BLD AUTO: 30.8 % (ref 40.4–52.2)
HCT VFR BLD AUTO: 31.7 % (ref 40.4–52.2)
HCT VFR BLD AUTO: 33.1 % (ref 40.4–52.2)
HCT VFR BLD AUTO: 33.4 % (ref 40–49)
HCT VFR BLD AUTO: 33.8 % (ref 40.4–52.2)
HCT VFR BLD AUTO: 33.8 % (ref 40.4–52.2)
HCT VFR BLD AUTO: 33.8 % (ref 40–49)
HCT VFR BLD AUTO: 35.2 % (ref 40–49)
HCT VFR BLD AUTO: 35.5 % (ref 40–49)
HCT VFR BLD AUTO: 36.4 % (ref 40–49)
HCT VFR BLD AUTO: 36.7 % (ref 40–49)
HCT VFR BLD AUTO: 37.1 % (ref 40–49)
HCT VFR BLD AUTO: 37.3 % (ref 40–49)
HCT VFR BLD AUTO: 37.4 % (ref 40–49)
HCT VFR BLD AUTO: 37.5 % (ref 40–49)
HCT VFR BLD AUTO: 37.9 % (ref 40–49)
HCT VFR BLD AUTO: 38.6 % (ref 40–49)
HCT VFR BLD AUTO: 38.9 % (ref 40.4–52.2)
HCT VFR BLD AUTO: 39.1 % (ref 40–49)
HGB BLD-MCNC: 10 G/DL (ref 13.7–17.6)
HGB BLD-MCNC: 10.2 G/DL (ref 13.5–16.5)
HGB BLD-MCNC: 10.7 G/DL (ref 13.5–16.5)
HGB BLD-MCNC: 10.7 G/DL (ref 13.7–17.6)
HGB BLD-MCNC: 10.9 G/DL (ref 13.5–16.5)
HGB BLD-MCNC: 10.9 G/DL (ref 13.7–17.6)
HGB BLD-MCNC: 11 G/DL (ref 13.5–16.5)
HGB BLD-MCNC: 11.4 G/DL (ref 13.5–16.5)
HGB BLD-MCNC: 11.7 G/DL (ref 13.5–16.5)
HGB BLD-MCNC: 11.8 G/DL (ref 13.5–16.5)
HGB BLD-MCNC: 11.8 G/DL (ref 13.5–16.5)
HGB BLD-MCNC: 11.9 G/DL (ref 13.5–16.5)
HGB BLD-MCNC: 11.9 G/DL (ref 13.5–16.5)
HGB BLD-MCNC: 12.1 G/DL (ref 13.7–17.6)
HGB BLD-MCNC: 12.2 G/DL (ref 13.5–16.5)
HGB BLD-MCNC: 12.3 G/DL (ref 13.5–16.5)
HGB BLD-MCNC: 12.4 G/DL (ref 13.5–16.5)
HGB BLD-MCNC: 9.2 G/DL (ref 13.7–17.6)
HGB BLD-MCNC: 9.6 G/DL (ref 13.7–17.6)
HGB BLD-MCNC: 9.6 G/DL (ref 13.7–17.6)
HGB UR QL STRIP.AUTO: ABNORMAL
HOLD SPECIMEN: NORMAL
HOROWITZ INDEX BLD+IHG-RTO: 100 %
HOROWITZ INDEX BLD+IHG-RTO: 60 %
HOROWITZ INDEX BLD+IHG-RTO: 60 %
HOROWITZ INDEX BLD+IHG-RTO: 70 %
HYALINE CASTS UR QL AUTO: ABNORMAL /LPF
IMM GRANULOCYTES # BLD: 0 10*3/MM3 (ref 0–0.03)
IMM GRANULOCYTES # BLD: 0.02 10*3/MM3 (ref 0–0.03)
IMM GRANULOCYTES # BLD: 0.02 10*3/MM3 (ref 0–0.03)
IMM GRANULOCYTES # BLD: 0.03 10*3/MM3 (ref 0–0.03)
IMM GRANULOCYTES # BLD: 0.04 10*3/MM3 (ref 0–0.03)
IMM GRANULOCYTES # BLD: 0.05 10*3/MM3 (ref 0–0.03)
IMM GRANULOCYTES # BLD: 0.07 10*3/MM3 (ref 0–0.03)
IMM GRANULOCYTES # BLD: 0.09 10*3/MM3 (ref 0–0.03)
IMM GRANULOCYTES # BLD: 0.12 10*3/MM3 (ref 0–0.03)
IMM GRANULOCYTES # BLD: 0.18 10*3/MM3 (ref 0–0.03)
IMM GRANULOCYTES # BLD: 0.23 10*3/MM3 (ref 0–0.03)
IMM GRANULOCYTES # BLD: 0.33 10*3/MM3 (ref 0–0.03)
IMM GRANULOCYTES NFR BLD: 0 % (ref 0–0.5)
IMM GRANULOCYTES NFR BLD: 0.3 % (ref 0–0.5)
IMM GRANULOCYTES NFR BLD: 0.4 % (ref 0–0.5)
IMM GRANULOCYTES NFR BLD: 0.5 % (ref 0–0.5)
IMM GRANULOCYTES NFR BLD: 0.7 % (ref 0–0.5)
IMM GRANULOCYTES NFR BLD: 0.8 % (ref 0–0.5)
IMM GRANULOCYTES NFR BLD: 1 % (ref 0–0.5)
IMM GRANULOCYTES NFR BLD: 1.1 % (ref 0–0.5)
IMM GRANULOCYTES NFR BLD: 1.2 % (ref 0–0.5)
IMM GRANULOCYTES NFR BLD: 1.2 % (ref 0–0.5)
IMM GRANULOCYTES NFR BLD: 1.3 % (ref 0–0.5)
IMM GRANULOCYTES NFR BLD: 1.7 % (ref 0–0.5)
IMM GRANULOCYTES NFR BLD: 1.7 % (ref 0–0.5)
IMM GRANULOCYTES NFR BLD: 4.5 % (ref 0–0.5)
INR PPP: 1.33 (ref 0.9–1.1)
INR PPP: 1.36 (ref 0.9–1.1)
IRON 24H UR-MRATE: 32 MCG/DL (ref 59–158)
IRON SATN MFR SERPL: 12 % (ref 20–50)
KETONES UR QL STRIP: NEGATIVE
LEUKOCYTE ESTERASE UR QL STRIP.AUTO: ABNORMAL
LYMPHOCYTES # BLD AUTO: 0.56 10*3/MM3 (ref 1–3.5)
LYMPHOCYTES # BLD AUTO: 0.59 10*3/MM3 (ref 1–3.5)
LYMPHOCYTES # BLD AUTO: 0.63 10*3/MM3 (ref 1–3.5)
LYMPHOCYTES # BLD AUTO: 0.71 10*3/MM3 (ref 1–3.5)
LYMPHOCYTES # BLD AUTO: 0.73 10*3/MM3 (ref 0.9–4.8)
LYMPHOCYTES # BLD AUTO: 0.73 10*3/MM3 (ref 1–3.5)
LYMPHOCYTES # BLD AUTO: 0.88 10*3/MM3 (ref 1–3.5)
LYMPHOCYTES # BLD AUTO: 0.91 10*3/MM3 (ref 1–3.5)
LYMPHOCYTES # BLD AUTO: 0.96 10*3/MM3 (ref 1–3.5)
LYMPHOCYTES # BLD AUTO: 0.98 10*3/MM3 (ref 0.9–4.8)
LYMPHOCYTES # BLD AUTO: 0.98 10*3/MM3 (ref 1–3.5)
LYMPHOCYTES # BLD AUTO: 1.04 10*3/MM3 (ref 0.9–4.8)
LYMPHOCYTES # BLD AUTO: 1.06 10*3/MM3 (ref 1–3.5)
LYMPHOCYTES # BLD AUTO: 1.06 10*3/MM3 (ref 1–3.5)
LYMPHOCYTES # BLD AUTO: 1.09 10*3/MM3 (ref 1–3.5)
LYMPHOCYTES # BLD AUTO: 1.57 10*3/MM3 (ref 1–3.5)
LYMPHOCYTES # BLD AUTO: 2.36 10*3/MM3 (ref 0.9–4.8)
LYMPHOCYTES NFR BLD AUTO: 10.4 % (ref 20–49)
LYMPHOCYTES NFR BLD AUTO: 10.9 % (ref 20–49)
LYMPHOCYTES NFR BLD AUTO: 11.7 % (ref 20–49)
LYMPHOCYTES NFR BLD AUTO: 11.8 % (ref 20–49)
LYMPHOCYTES NFR BLD AUTO: 12.6 % (ref 19.6–45.3)
LYMPHOCYTES NFR BLD AUTO: 13.2 % (ref 20–49)
LYMPHOCYTES NFR BLD AUTO: 13.3 % (ref 20–49)
LYMPHOCYTES NFR BLD AUTO: 15.8 % (ref 20–49)
LYMPHOCYTES NFR BLD AUTO: 18.5 % (ref 20–49)
LYMPHOCYTES NFR BLD AUTO: 32.9 % (ref 20–49)
LYMPHOCYTES NFR BLD AUTO: 6.2 % (ref 20–49)
LYMPHOCYTES NFR BLD AUTO: 7.7 % (ref 20–49)
LYMPHOCYTES NFR BLD AUTO: 8.1 % (ref 19.6–45.3)
LYMPHOCYTES NFR BLD AUTO: 8.7 % (ref 19.6–45.3)
LYMPHOCYTES NFR BLD AUTO: 9.1 % (ref 20–49)
LYMPHOCYTES NFR BLD AUTO: 9.3 % (ref 19.6–45.3)
LYMPHOCYTES NFR BLD AUTO: 9.7 % (ref 20–49)
MAGNESIUM SERPL-MCNC: 3.3 MG/DL (ref 1.8–2.5)
MCH RBC QN AUTO: 28.7 PG (ref 27–32.7)
MCH RBC QN AUTO: 29.1 PG (ref 27–32.7)
MCH RBC QN AUTO: 29.1 PG (ref 27–33)
MCH RBC QN AUTO: 29.1 PG (ref 27–33)
MCH RBC QN AUTO: 29.3 PG (ref 27–33)
MCH RBC QN AUTO: 29.4 PG (ref 27–33)
MCH RBC QN AUTO: 29.5 PG (ref 27–32.7)
MCH RBC QN AUTO: 29.6 PG (ref 27–32.7)
MCH RBC QN AUTO: 29.6 PG (ref 27–33)
MCH RBC QN AUTO: 29.6 PG (ref 27–33)
MCH RBC QN AUTO: 30 PG (ref 27–33)
MCH RBC QN AUTO: 30.4 PG (ref 27–33)
MCH RBC QN AUTO: 30.4 PG (ref 27–33)
MCH RBC QN AUTO: 30.5 PG (ref 27–33)
MCH RBC QN AUTO: 30.7 PG (ref 27–33)
MCH RBC QN AUTO: 30.8 PG (ref 27–33)
MCH RBC QN AUTO: 30.8 PG (ref 27–33)
MCH RBC QN AUTO: 31.3 PG (ref 27–32.7)
MCH RBC QN AUTO: 31.6 PG (ref 27–32.7)
MCH RBC QN AUTO: 31.6 PG (ref 27–32.7)
MCHC RBC AUTO-ENTMCNC: 29.9 G/DL (ref 32–35)
MCHC RBC AUTO-ENTMCNC: 30.2 G/DL (ref 32.6–36.4)
MCHC RBC AUTO-ENTMCNC: 30.3 G/DL (ref 32.6–36.4)
MCHC RBC AUTO-ENTMCNC: 30.4 G/DL (ref 32–35)
MCHC RBC AUTO-ENTMCNC: 30.5 G/DL (ref 32–35)
MCHC RBC AUTO-ENTMCNC: 30.6 G/DL (ref 32.6–36.4)
MCHC RBC AUTO-ENTMCNC: 31 G/DL (ref 32–35)
MCHC RBC AUTO-ENTMCNC: 31.1 G/DL (ref 32.6–36.4)
MCHC RBC AUTO-ENTMCNC: 31.2 G/DL (ref 32.6–36.4)
MCHC RBC AUTO-ENTMCNC: 31.5 G/DL (ref 32–35)
MCHC RBC AUTO-ENTMCNC: 31.7 G/DL (ref 32.6–36.4)
MCHC RBC AUTO-ENTMCNC: 31.7 G/DL (ref 32–35)
MCHC RBC AUTO-ENTMCNC: 31.8 G/DL (ref 32–35)
MCHC RBC AUTO-ENTMCNC: 31.8 G/DL (ref 32–35)
MCHC RBC AUTO-ENTMCNC: 31.9 G/DL (ref 32–35)
MCHC RBC AUTO-ENTMCNC: 32.1 G/DL (ref 32–35)
MCHC RBC AUTO-ENTMCNC: 32.2 G/DL (ref 32.6–36.4)
MCHC RBC AUTO-ENTMCNC: 32.4 G/DL (ref 32–35)
MCHC RBC AUTO-ENTMCNC: 32.5 G/DL (ref 32–35)
MCHC RBC AUTO-ENTMCNC: 32.7 G/DL (ref 32–35)
MCV RBC AUTO: 100.8 FL (ref 79.8–96.2)
MCV RBC AUTO: 90.5 FL (ref 83–97)
MCV RBC AUTO: 91.3 FL (ref 83–97)
MCV RBC AUTO: 93.6 FL (ref 83–97)
MCV RBC AUTO: 94.1 FL (ref 83–97)
MCV RBC AUTO: 94.6 FL (ref 79.8–96.2)
MCV RBC AUTO: 95.1 FL (ref 79.8–96.2)
MCV RBC AUTO: 95.1 FL (ref 83–97)
MCV RBC AUTO: 95.4 FL (ref 83–97)
MCV RBC AUTO: 95.6 FL (ref 83–97)
MCV RBC AUTO: 96 FL (ref 83–97)
MCV RBC AUTO: 96.2 FL (ref 79.8–96.2)
MCV RBC AUTO: 96.3 FL (ref 83–97)
MCV RBC AUTO: 96.5 FL (ref 79.8–96.2)
MCV RBC AUTO: 96.6 FL (ref 83–97)
MCV RBC AUTO: 96.9 FL (ref 83–97)
MCV RBC AUTO: 96.9 FL (ref 83–97)
MCV RBC AUTO: 97.3 FL (ref 83–97)
MCV RBC AUTO: 98 FL (ref 79.8–96.2)
MCV RBC AUTO: 99.7 FL (ref 79.8–96.2)
MODALITY: ABNORMAL
MONOCYTES # BLD AUTO: 0.11 10*3/MM3 (ref 0.25–0.8)
MONOCYTES # BLD AUTO: 0.17 10*3/MM3 (ref 0.25–0.8)
MONOCYTES # BLD AUTO: 0.43 10*3/MM3 (ref 0.25–0.8)
MONOCYTES # BLD AUTO: 0.44 10*3/MM3 (ref 0.25–0.8)
MONOCYTES # BLD AUTO: 0.45 10*3/MM3 (ref 0.25–0.8)
MONOCYTES # BLD AUTO: 0.49 10*3/MM3 (ref 0.25–0.8)
MONOCYTES # BLD AUTO: 0.51 10*3/MM3 (ref 0.25–0.8)
MONOCYTES # BLD AUTO: 0.52 10*3/MM3 (ref 0.25–0.8)
MONOCYTES # BLD AUTO: 0.53 10*3/MM3 (ref 0.25–0.8)
MONOCYTES # BLD AUTO: 0.53 10*3/MM3 (ref 0.25–0.8)
MONOCYTES # BLD AUTO: 0.58 10*3/MM3 (ref 0.25–0.8)
MONOCYTES # BLD AUTO: 0.67 10*3/MM3 (ref 0.25–0.8)
MONOCYTES # BLD AUTO: 0.71 10*3/MM3 (ref 0.2–1.2)
MONOCYTES # BLD AUTO: 0.71 10*3/MM3 (ref 0.2–1.2)
MONOCYTES # BLD AUTO: 0.73 10*3/MM3 (ref 0.2–1.2)
MONOCYTES # BLD AUTO: 0.79 10*3/MM3 (ref 0.2–1.2)
MONOCYTES # BLD AUTO: 0.82 10*3/MM3 (ref 0.25–0.8)
MONOCYTES NFR BLD AUTO: 10 % (ref 4–12)
MONOCYTES NFR BLD AUTO: 2.5 % (ref 4–12)
MONOCYTES NFR BLD AUTO: 3.8 % (ref 5–12)
MONOCYTES NFR BLD AUTO: 4.5 % (ref 4–12)
MONOCYTES NFR BLD AUTO: 4.6 % (ref 4–12)
MONOCYTES NFR BLD AUTO: 5.2 % (ref 4–12)
MONOCYTES NFR BLD AUTO: 5.5 % (ref 5–12)
MONOCYTES NFR BLD AUTO: 5.9 % (ref 4–12)
MONOCYTES NFR BLD AUTO: 6.1 % (ref 4–12)
MONOCYTES NFR BLD AUTO: 6.2 % (ref 4–12)
MONOCYTES NFR BLD AUTO: 6.5 % (ref 4–12)
MONOCYTES NFR BLD AUTO: 6.9 % (ref 4–12)
MONOCYTES NFR BLD AUTO: 7.1 % (ref 4–12)
MONOCYTES NFR BLD AUTO: 7.2 % (ref 4–12)
MONOCYTES NFR BLD AUTO: 7.5 % (ref 5–12)
MONOCYTES NFR BLD AUTO: 8.7 % (ref 5–12)
MONOCYTES NFR BLD AUTO: 9.7 % (ref 4–12)
MRSA SPEC QL CULT: NORMAL
NEUTROPHILS # BLD AUTO: 0.77 10*3/MM3 (ref 1.5–7)
NEUTROPHILS # BLD AUTO: 10.98 10*3/MM3 (ref 1.9–8.1)
NEUTROPHILS # BLD AUTO: 15.38 10*3/MM3 (ref 1.9–8.1)
NEUTROPHILS # BLD AUTO: 3.51 10*3/MM3 (ref 1.5–7)
NEUTROPHILS # BLD AUTO: 4.82 10*3/MM3 (ref 1.5–7)
NEUTROPHILS # BLD AUTO: 5.43 10*3/MM3 (ref 1.5–7)
NEUTROPHILS # BLD AUTO: 5.6 10*3/MM3 (ref 1.5–7)
NEUTROPHILS # BLD AUTO: 5.61 10*3/MM3 (ref 1.5–7)
NEUTROPHILS # BLD AUTO: 5.77 10*3/MM3 (ref 1.5–7)
NEUTROPHILS # BLD AUTO: 6.03 10*3/MM3 (ref 1.5–7)
NEUTROPHILS # BLD AUTO: 6.68 10*3/MM3 (ref 1.9–8.1)
NEUTROPHILS # BLD AUTO: 7.52 10*3/MM3 (ref 1.5–7)
NEUTROPHILS # BLD AUTO: 7.82 10*3/MM3 (ref 1.5–7)
NEUTROPHILS # BLD AUTO: 7.86 10*3/MM3 (ref 1.5–7)
NEUTROPHILS # BLD AUTO: 8.15 10*3/MM3 (ref 1.5–7)
NEUTROPHILS # BLD AUTO: 8.4 10*3/MM3 (ref 1.9–8.1)
NEUTROPHILS # BLD AUTO: 9.83 10*3/MM3 (ref 1.5–7)
NEUTROPHILS NFR BLD AUTO: 45.3 % (ref 39–75)
NEUTROPHILS NFR BLD AUTO: 65.9 % (ref 39–75)
NEUTROPHILS NFR BLD AUTO: 69.9 % (ref 39–75)
NEUTROPHILS NFR BLD AUTO: 73.4 % (ref 39–75)
NEUTROPHILS NFR BLD AUTO: 74.1 % (ref 39–75)
NEUTROPHILS NFR BLD AUTO: 77.1 % (ref 39–75)
NEUTROPHILS NFR BLD AUTO: 79.2 % (ref 39–75)
NEUTROPHILS NFR BLD AUTO: 79.2 % (ref 42.7–76)
NEUTROPHILS NFR BLD AUTO: 80.1 % (ref 42.7–76)
NEUTROPHILS NFR BLD AUTO: 80.2 % (ref 39–75)
NEUTROPHILS NFR BLD AUTO: 80.3 % (ref 39–75)
NEUTROPHILS NFR BLD AUTO: 80.7 % (ref 39–75)
NEUTROPHILS NFR BLD AUTO: 80.8 % (ref 39–75)
NEUTROPHILS NFR BLD AUTO: 82 % (ref 42.7–76)
NEUTROPHILS NFR BLD AUTO: 82.8 % (ref 39–75)
NEUTROPHILS NFR BLD AUTO: 85.1 % (ref 42.7–76)
NEUTROPHILS NFR BLD AUTO: 85.7 % (ref 39–75)
NITRITE UR QL STRIP: NEGATIVE
NRBC BLD MANUAL-RTO: 0 /100 WBC (ref 0–0)
NT-PROBNP SERPL-MCNC: 1709 PG/ML (ref 0–1800)
NT-PROBNP SERPL-MCNC: 4862 PG/ML (ref 0–1800)
NT-PROBNP SERPL-MCNC: 4938 PG/ML (ref 0–1800)
O2 A-A PPRESDIFF RESPIRATORY: 0.2 MMHG
PCO2 BLDA: 59.7 MM HG (ref 35–45)
PCO2 BLDA: 64.6 MM HG (ref 35–45)
PCO2 BLDA: 66.3 MM HG (ref 35–45)
PCO2 BLDA: 70.7 MM HG (ref 35–45)
PCO2 BLDA: 75.5 MM HG (ref 35–45)
PCO2 BLDA: 77.8 MM HG (ref 35–45)
PCO2 BLDA: 81.8 MM HG (ref 35–45)
PH BLDA: 7.3 PH UNITS (ref 7.35–7.45)
PH BLDA: 7.31 PH UNITS (ref 7.35–7.45)
PH BLDA: 7.32 PH UNITS (ref 7.35–7.45)
PH BLDA: 7.33 PH UNITS (ref 7.35–7.45)
PH BLDA: 7.35 PH UNITS (ref 7.35–7.45)
PH BLDA: 7.37 PH UNITS (ref 7.35–7.45)
PH BLDA: 7.4 PH UNITS (ref 7.35–7.45)
PH UR STRIP.AUTO: 7 [PH] (ref 5–8)
PHOSPHATE SERPL-MCNC: 3 MG/DL (ref 2.5–4.5)
PLATELET # BLD AUTO: 153 10*3/MM3 (ref 150–375)
PLATELET # BLD AUTO: 164 10*3/MM3 (ref 150–375)
PLATELET # BLD AUTO: 166 10*3/MM3 (ref 150–375)
PLATELET # BLD AUTO: 175 10*3/MM3 (ref 150–375)
PLATELET # BLD AUTO: 176 10*3/MM3 (ref 140–500)
PLATELET # BLD AUTO: 182 10*3/MM3 (ref 150–375)
PLATELET # BLD AUTO: 183 10*3/MM3 (ref 150–375)
PLATELET # BLD AUTO: 183 10*3/MM3 (ref 150–375)
PLATELET # BLD AUTO: 184 10*3/MM3 (ref 150–375)
PLATELET # BLD AUTO: 196 10*3/MM3 (ref 150–375)
PLATELET # BLD AUTO: 198 10*3/MM3 (ref 140–500)
PLATELET # BLD AUTO: 200 10*3/MM3 (ref 140–500)
PLATELET # BLD AUTO: 201 10*3/MM3 (ref 150–375)
PLATELET # BLD AUTO: 215 10*3/MM3 (ref 140–500)
PLATELET # BLD AUTO: 221 10*3/MM3 (ref 140–500)
PLATELET # BLD AUTO: 230 10*3/MM3 (ref 150–375)
PLATELET # BLD AUTO: 243 10*3/MM3 (ref 140–500)
PLATELET # BLD AUTO: 244 10*3/MM3 (ref 140–500)
PLATELET # BLD AUTO: 263 10*3/MM3 (ref 150–375)
PLATELET # BLD AUTO: 83 10*3/MM3 (ref 150–375)
PMV BLD AUTO: 10 FL (ref 8.9–12.1)
PMV BLD AUTO: 10.3 FL (ref 8.9–12.1)
PMV BLD AUTO: 10.3 FL (ref 8.9–12.1)
PMV BLD AUTO: 10.5 FL (ref 8.9–12.1)
PMV BLD AUTO: 10.6 FL (ref 6–12)
PMV BLD AUTO: 10.6 FL (ref 6–12)
PMV BLD AUTO: 10.9 FL (ref 8.9–12.1)
PMV BLD AUTO: 11 FL (ref 8.9–12.1)
PMV BLD AUTO: 11.1 FL (ref 6–12)
PMV BLD AUTO: 9.1 FL (ref 6–12)
PMV BLD AUTO: 9.1 FL (ref 8.9–12.1)
PMV BLD AUTO: 9.4 FL (ref 6–12)
PMV BLD AUTO: 9.5 FL (ref 6–12)
PMV BLD AUTO: 9.5 FL (ref 6–12)
PMV BLD AUTO: 9.5 FL (ref 8.9–12.1)
PMV BLD AUTO: 9.5 FL (ref 8.9–12.1)
PMV BLD AUTO: 9.7 FL (ref 8.9–12.1)
PMV BLD AUTO: 9.8 FL (ref 8.9–12.1)
PMV BLD AUTO: 9.9 FL (ref 8.9–12.1)
PMV BLD AUTO: 9.9 FL (ref 8.9–12.1)
PO2 BLDA: 104.8 MM HG (ref 80–100)
PO2 BLDA: 43.6 MM HG (ref 80–100)
PO2 BLDA: 58 MM HG (ref 80–100)
PO2 BLDA: 58.4 MM HG (ref 80–100)
PO2 BLDA: 81 MM HG (ref 80–100)
PO2 BLDA: 88.9 MM HG (ref 80–100)
PO2 BLDA: 89.7 MM HG (ref 80–100)
POTASSIUM BLD-SCNC: 4.3 MMOL/L (ref 3.5–5.2)
POTASSIUM BLD-SCNC: 4.3 MMOL/L (ref 3.5–5.2)
POTASSIUM BLD-SCNC: 4.4 MMOL/L (ref 3.5–4.7)
POTASSIUM BLD-SCNC: 4.4 MMOL/L (ref 3.5–5.2)
POTASSIUM BLD-SCNC: 4.5 MMOL/L (ref 3.5–4.7)
POTASSIUM BLD-SCNC: 4.5 MMOL/L (ref 3.5–5.2)
POTASSIUM BLD-SCNC: 4.6 MMOL/L (ref 3.5–4.7)
POTASSIUM BLD-SCNC: 4.7 MMOL/L (ref 3.5–4.7)
POTASSIUM BLD-SCNC: 4.7 MMOL/L (ref 3.5–5.2)
POTASSIUM BLD-SCNC: 4.8 MMOL/L (ref 3.5–4.7)
POTASSIUM BLD-SCNC: 4.8 MMOL/L (ref 3.5–5.2)
POTASSIUM BLD-SCNC: 4.9 MMOL/L (ref 3.5–4.7)
POTASSIUM BLD-SCNC: 5 MMOL/L (ref 3.5–4.7)
POTASSIUM BLD-SCNC: 5.2 MMOL/L (ref 3.5–5.2)
POTASSIUM BLD-SCNC: 5.6 MMOL/L (ref 3.5–5.2)
PROCALCITONIN SERPL-MCNC: 0.19 NG/ML (ref 0.1–0.25)
PROT SERPL-MCNC: 7 G/DL (ref 6.3–8)
PROT SERPL-MCNC: 7 G/DL (ref 6.3–8)
PROT SERPL-MCNC: 7.2 G/DL (ref 6.3–8)
PROT SERPL-MCNC: 7.2 G/DL (ref 6.3–8)
PROT SERPL-MCNC: 7.3 G/DL (ref 6.3–8)
PROT SERPL-MCNC: 7.3 G/DL (ref 6–8.5)
PROT SERPL-MCNC: 7.4 G/DL (ref 6.3–8)
PROT SERPL-MCNC: 7.4 G/DL (ref 6.3–8)
PROT SERPL-MCNC: 7.5 G/DL (ref 6.3–8)
PROT SERPL-MCNC: 7.5 G/DL (ref 6.3–8)
PROT SERPL-MCNC: 7.6 G/DL (ref 6.3–8)
PROT SERPL-MCNC: 7.6 G/DL (ref 6–8.5)
PROT SERPL-MCNC: 7.7 G/DL (ref 6.3–8)
PROT SERPL-MCNC: 7.8 G/DL (ref 6.3–8)
PROT SERPL-MCNC: 7.9 G/DL (ref 6.3–8)
PROT UR QL STRIP: ABNORMAL
PROTHROMBIN TIME: 16 SECONDS (ref 11.7–14.2)
PROTHROMBIN TIME: 16.3 SECONDS (ref 11.7–14.2)
RBC # BLD AUTO: 3.12 10*6/MM3 (ref 4.6–6)
RBC # BLD AUTO: 3.24 10*6/MM3 (ref 4.6–6)
RBC # BLD AUTO: 3.35 10*6/MM3 (ref 4.6–6)
RBC # BLD AUTO: 3.39 10*6/MM3 (ref 4.6–6)
RBC # BLD AUTO: 3.44 10*6/MM3 (ref 4.6–6)
RBC # BLD AUTO: 3.45 10*6/MM3 (ref 4.6–6)
RBC # BLD AUTO: 3.47 10*6/MM3 (ref 4.3–5.5)
RBC # BLD AUTO: 3.52 10*6/MM3 (ref 4.3–5.5)
RBC # BLD AUTO: 3.7 10*6/MM3 (ref 4.3–5.5)
RBC # BLD AUTO: 3.72 10*6/MM3 (ref 4.3–5.5)
RBC # BLD AUTO: 3.74 10*6/MM3 (ref 4.3–5.5)
RBC # BLD AUTO: 3.84 10*6/MM3 (ref 4.3–5.5)
RBC # BLD AUTO: 3.86 10*6/MM3 (ref 4.3–5.5)
RBC # BLD AUTO: 3.86 10*6/MM3 (ref 4.6–6)
RBC # BLD AUTO: 3.87 10*6/MM3 (ref 4.3–5.5)
RBC # BLD AUTO: 3.9 10*6/MM3 (ref 4.3–5.5)
RBC # BLD AUTO: 4.05 10*6/MM3 (ref 4.3–5.5)
RBC # BLD AUTO: 4.09 10*6/MM3 (ref 4.3–5.5)
RBC # BLD AUTO: 4.12 10*6/MM3 (ref 4.3–5.5)
RBC # BLD AUTO: 4.23 10*6/MM3 (ref 4.3–5.5)
RBC # UR: ABNORMAL /HPF
REF LAB TEST METHOD: ABNORMAL
SAO2 % BLDCOA: 72 % (ref 92–99)
SAO2 % BLDCOA: 84.9 % (ref 92–99)
SAO2 % BLDCOA: 87.3 % (ref 92–99)
SAO2 % BLDCOA: 94.9 % (ref 92–99)
SAO2 % BLDCOA: 95.6 % (ref 92–99)
SAO2 % BLDCOA: 96.5 % (ref 92–99)
SAO2 % BLDCOA: 97.1 % (ref 92–99)
SET MECH RESP RATE: 16
SET MECH RESP RATE: 18
SET MECH RESP RATE: 18
SET MECH RESP RATE: 24
SET MECH RESP RATE: 26
SODIUM BLD-SCNC: 131 MMOL/L (ref 136–145)
SODIUM BLD-SCNC: 131 MMOL/L (ref 136–145)
SODIUM BLD-SCNC: 134 MMOL/L (ref 136–145)
SODIUM BLD-SCNC: 134 MMOL/L (ref 136–145)
SODIUM BLD-SCNC: 135 MMOL/L (ref 136–145)
SODIUM BLD-SCNC: 137 MMOL/L (ref 134–145)
SODIUM BLD-SCNC: 138 MMOL/L (ref 136–145)
SODIUM BLD-SCNC: 140 MMOL/L (ref 134–145)
SODIUM BLD-SCNC: 141 MMOL/L (ref 134–145)
SODIUM BLD-SCNC: 141 MMOL/L (ref 136–145)
SODIUM BLD-SCNC: 142 MMOL/L (ref 134–145)
SODIUM BLD-SCNC: 142 MMOL/L (ref 136–145)
SODIUM BLD-SCNC: 143 MMOL/L (ref 134–145)
SP GR UR STRIP: 1.02 (ref 1–1.03)
SQUAMOUS #/AREA URNS HPF: ABNORMAL /HPF
T4 FREE SERPL-MCNC: 0.86 NG/DL (ref 0.93–1.7)
TIBC SERPL-MCNC: 267 MCG/DL (ref 298–536)
TOBRAMYCIN RAND SERPL-MCNC: 2 MCG/ML (ref 0.5–10)
TOBRAMYCIN RAND SERPL-MCNC: 8.5 MCG/ML (ref 0.5–10)
TOBRAMYCIN RAND SERPL-MCNC: >20 MCG/ML (ref 0.5–10)
TOTAL RATE: 18 BREATHS/MINUTE
TOTAL RATE: 18 BREATHS/MINUTE
TOTAL RATE: 20 BREATHS/MINUTE
TOTAL RATE: 24 BREATHS/MINUTE
TOTAL RATE: 24 BREATHS/MINUTE
TOTAL RATE: 32 BREATHS/MINUTE
TRANSFERRIN SERPL-MCNC: 179 MG/DL (ref 200–360)
TROPONIN T SERPL-MCNC: <0.01 NG/ML (ref 0–0.03)
TROPONIN T SERPL-MCNC: <0.01 NG/ML (ref 0–0.03)
TSH SERPL DL<=0.05 MIU/L-ACNC: 1.92 MIU/ML (ref 0.27–4.2)
TSH SERPL DL<=0.05 MIU/L-ACNC: 2.05 MIU/ML (ref 0.27–4.2)
UROBILINOGEN UR QL STRIP: ABNORMAL
VIT B12 BLD-MCNC: 1257 PG/ML (ref 211–946)
VT ON VENT VENT: 500 ML
VT ON VENT VENT: 580 ML
VT ON VENT VENT: 602 ML
VT ON VENT VENT: 603 ML
WBC NRBC COR # BLD: 1.7 10*3/MM3 (ref 4–10)
WBC NRBC COR # BLD: 10.16 10*3/MM3 (ref 4–10)
WBC NRBC COR # BLD: 10.49 10*3/MM3 (ref 4.5–10.7)
WBC NRBC COR # BLD: 10.87 10*3/MM3 (ref 4.5–10.7)
WBC NRBC COR # BLD: 11.48 10*3/MM3 (ref 4–10)
WBC NRBC COR # BLD: 12.9 10*3/MM3 (ref 4.5–10.7)
WBC NRBC COR # BLD: 18.75 10*3/MM3 (ref 4.5–10.7)
WBC NRBC COR # BLD: 22.66 10*3/MM3 (ref 4.5–10.7)
WBC NRBC COR # BLD: 4.43 10*3/MM3 (ref 4–10)
WBC NRBC COR # BLD: 6.9 10*3/MM3 (ref 4–10)
WBC NRBC COR # BLD: 6.95 10*3/MM3 (ref 4–10)
WBC NRBC COR # BLD: 7.4 10*3/MM3 (ref 4–10)
WBC NRBC COR # BLD: 7.49 10*3/MM3 (ref 4–10)
WBC NRBC COR # BLD: 8.14 10*3/MM3 (ref 4–10)
WBC NRBC COR # BLD: 8.43 10*3/MM3 (ref 4.5–10.7)
WBC NRBC COR # BLD: 8.49 10*3/MM3 (ref 4–10)
WBC NRBC COR # BLD: 9.37 10*3/MM3 (ref 4–10)
WBC NRBC COR # BLD: 9.45 10*3/MM3 (ref 4–10)
WBC NRBC COR # BLD: 9.67 10*3/MM3 (ref 4.5–10.7)
WBC NRBC COR # BLD: 9.73 10*3/MM3 (ref 4–10)
WBC UR QL AUTO: ABNORMAL /HPF

## 2017-01-01 PROCEDURE — 96413 CHEMO IV INFUSION 1 HR: CPT | Performed by: INTERNAL MEDICINE

## 2017-01-01 PROCEDURE — 25010000002 CARBOPLATIN PER 50 MG: Performed by: INTERNAL MEDICINE

## 2017-01-01 PROCEDURE — 77263 THER RADIOLOGY TX PLNG CPLX: CPT | Performed by: RADIOLOGY

## 2017-01-01 PROCEDURE — 25010000002 DIPHENHYDRAMINE PER 50 MG: Performed by: INTERNAL MEDICINE

## 2017-01-01 PROCEDURE — 94799 UNLISTED PULMONARY SVC/PX: CPT

## 2017-01-01 PROCEDURE — 25010000002 PIPERACILLIN SOD-TAZOBACTAM PER 1 G: Performed by: INTERNAL MEDICINE

## 2017-01-01 PROCEDURE — 36600 WITHDRAWAL OF ARTERIAL BLOOD: CPT

## 2017-01-01 PROCEDURE — 99213 OFFICE O/P EST LOW 20 MIN: CPT | Performed by: NURSE PRACTITIONER

## 2017-01-01 PROCEDURE — 80053 COMPREHEN METABOLIC PANEL: CPT

## 2017-01-01 PROCEDURE — 99232 SBSQ HOSP IP/OBS MODERATE 35: CPT | Performed by: INTERNAL MEDICINE

## 2017-01-01 PROCEDURE — 80053 COMPREHEN METABOLIC PANEL: CPT | Performed by: INTERNAL MEDICINE

## 2017-01-01 PROCEDURE — 80200 ASSAY OF TOBRAMYCIN: CPT | Performed by: INTERNAL MEDICINE

## 2017-01-01 PROCEDURE — 77300 RADIATION THERAPY DOSE PLAN: CPT | Performed by: RADIOLOGY

## 2017-01-01 PROCEDURE — 70553 MRI BRAIN STEM W/O & W/DYE: CPT

## 2017-01-01 PROCEDURE — 92526 ORAL FUNCTION THERAPY: CPT

## 2017-01-01 PROCEDURE — 77290 THER RAD SIMULAJ FIELD CPLX: CPT | Performed by: RADIOLOGY

## 2017-01-01 PROCEDURE — 85025 COMPLETE CBC W/AUTO DIFF WBC: CPT | Performed by: INTERNAL MEDICINE

## 2017-01-01 PROCEDURE — 85025 COMPLETE CBC W/AUTO DIFF WBC: CPT

## 2017-01-01 PROCEDURE — 77387 GUIDANCE FOR RADJ TX DLVR: CPT | Performed by: RADIOLOGY

## 2017-01-01 PROCEDURE — 97110 THERAPEUTIC EXERCISES: CPT

## 2017-01-01 PROCEDURE — 77336 RADIATION PHYSICS CONSULT: CPT | Performed by: RADIOLOGY

## 2017-01-01 PROCEDURE — 71250 CT THORAX DX C-: CPT

## 2017-01-01 PROCEDURE — 99215 OFFICE O/P EST HI 40 MIN: CPT | Performed by: RADIOLOGY

## 2017-01-01 PROCEDURE — 72100 X-RAY EXAM L-S SPINE 2/3 VWS: CPT

## 2017-01-01 PROCEDURE — 99214 OFFICE O/P EST MOD 30 MIN: CPT | Performed by: INTERNAL MEDICINE

## 2017-01-01 PROCEDURE — 83605 ASSAY OF LACTIC ACID: CPT | Performed by: EMERGENCY MEDICINE

## 2017-01-01 PROCEDURE — 25010000002 PIPERACILLIN SOD-TAZOBACTAM PER 1 G: Performed by: EMERGENCY MEDICINE

## 2017-01-01 PROCEDURE — 99285 EMERGENCY DEPT VISIT HI MDM: CPT

## 2017-01-01 PROCEDURE — 99024 POSTOP FOLLOW-UP VISIT: CPT | Performed by: RADIOLOGY

## 2017-01-01 PROCEDURE — 92611 MOTION FLUOROSCOPY/SWALLOW: CPT

## 2017-01-01 PROCEDURE — 77280 THER RAD SIMULAJ FIELD SMPL: CPT | Performed by: RADIOLOGY

## 2017-01-01 PROCEDURE — 84443 ASSAY THYROID STIM HORMONE: CPT | Performed by: INTERNAL MEDICINE

## 2017-01-01 PROCEDURE — 82962 GLUCOSE BLOOD TEST: CPT

## 2017-01-01 PROCEDURE — 77412 RADIATION TX DELIVERY LVL 3: CPT | Performed by: RADIOLOGY

## 2017-01-01 PROCEDURE — 93010 ELECTROCARDIOGRAM REPORT: CPT | Performed by: INTERNAL MEDICINE

## 2017-01-01 PROCEDURE — 77334 RADIATION TREATMENT AID(S): CPT | Performed by: RADIOLOGY

## 2017-01-01 PROCEDURE — 25010000002 CARBOPLATIN PER 50 MG: Performed by: NURSE PRACTITIONER

## 2017-01-01 PROCEDURE — 36415 COLL VENOUS BLD VENIPUNCTURE: CPT | Performed by: INTERNAL MEDICINE

## 2017-01-01 PROCEDURE — 82803 BLOOD GASES ANY COMBINATION: CPT

## 2017-01-01 PROCEDURE — 81001 URINALYSIS AUTO W/SCOPE: CPT | Performed by: EMERGENCY MEDICINE

## 2017-01-01 PROCEDURE — 36416 COLLJ CAPILLARY BLOOD SPEC: CPT | Performed by: INTERNAL MEDICINE

## 2017-01-01 PROCEDURE — 82746 ASSAY OF FOLIC ACID SERUM: CPT | Performed by: INTERNAL MEDICINE

## 2017-01-01 PROCEDURE — 25010000002 MORPHINE PER 10 MG: Performed by: INTERNAL MEDICINE

## 2017-01-01 PROCEDURE — 85025 COMPLETE CBC W/AUTO DIFF WBC: CPT | Performed by: EMERGENCY MEDICINE

## 2017-01-01 PROCEDURE — G0463 HOSPITAL OUTPT CLINIC VISIT: HCPCS | Performed by: NURSE PRACTITIONER

## 2017-01-01 PROCEDURE — 96375 TX/PRO/DX INJ NEW DRUG ADDON: CPT | Performed by: INTERNAL MEDICINE

## 2017-01-01 PROCEDURE — 25010000002 TOBRAMYCIN PER 80 MG: Performed by: INTERNAL MEDICINE

## 2017-01-01 PROCEDURE — 80053 COMPREHEN METABOLIC PANEL: CPT | Performed by: EMERGENCY MEDICINE

## 2017-01-01 PROCEDURE — 94660 CPAP INITIATION&MGMT: CPT

## 2017-01-01 PROCEDURE — 85027 COMPLETE CBC AUTOMATED: CPT | Performed by: INTERNAL MEDICINE

## 2017-01-01 PROCEDURE — 51703 INSERT BLADDER CATH COMPLEX: CPT

## 2017-01-01 PROCEDURE — A9552 F18 FDG: HCPCS | Performed by: INTERNAL MEDICINE

## 2017-01-01 PROCEDURE — 25010000002 DEXAMETHASONE SODIUM PHOSPHATE 10 MG/ML SOLUTION 1 ML VIAL: Performed by: NURSE PRACTITIONER

## 2017-01-01 PROCEDURE — 99214 OFFICE O/P EST MOD 30 MIN: CPT | Performed by: NURSE PRACTITIONER

## 2017-01-01 PROCEDURE — 77431 RADIATION THERAPY MANAGEMENT: CPT | Performed by: RADIOLOGY

## 2017-01-01 PROCEDURE — 74176 CT ABD & PELVIS W/O CONTRAST: CPT

## 2017-01-01 PROCEDURE — 25010000002 DEXAMETHASONE PER 1 MG: Performed by: INTERNAL MEDICINE

## 2017-01-01 PROCEDURE — 36415 COLL VENOUS BLD VENIPUNCTURE: CPT

## 2017-01-01 PROCEDURE — 5A09457 ASSISTANCE WITH RESPIRATORY VENTILATION, 24-96 CONSECUTIVE HOURS, CONTINUOUS POSITIVE AIRWAY PRESSURE: ICD-10-PCS | Performed by: INTERNAL MEDICINE

## 2017-01-01 PROCEDURE — 77295 3-D RADIOTHERAPY PLAN: CPT | Performed by: RADIOLOGY

## 2017-01-01 PROCEDURE — 99215 OFFICE O/P EST HI 40 MIN: CPT | Performed by: INTERNAL MEDICINE

## 2017-01-01 PROCEDURE — 84484 ASSAY OF TROPONIN QUANT: CPT | Performed by: EMERGENCY MEDICINE

## 2017-01-01 PROCEDURE — 92610 EVALUATE SWALLOWING FUNCTION: CPT

## 2017-01-01 PROCEDURE — 25010000002 PACLITAXEL PER 30 MG: Performed by: INTERNAL MEDICINE

## 2017-01-01 PROCEDURE — 25010000002 DIPHENHYDRAMINE PER 50 MG: Performed by: NURSE PRACTITIONER

## 2017-01-01 PROCEDURE — 25010000002 PALONOSETRON PER 25 MCG: Performed by: INTERNAL MEDICINE

## 2017-01-01 PROCEDURE — 25010000002 NIVOLUMAB 40 MG/4ML SOLUTION 4 ML VIAL: Performed by: INTERNAL MEDICINE

## 2017-01-01 PROCEDURE — 87040 BLOOD CULTURE FOR BACTERIA: CPT | Performed by: EMERGENCY MEDICINE

## 2017-01-01 PROCEDURE — 80048 BASIC METABOLIC PNL TOTAL CA: CPT | Performed by: INTERNAL MEDICINE

## 2017-01-01 PROCEDURE — 74230 X-RAY XM SWLNG FUNCJ C+: CPT

## 2017-01-01 PROCEDURE — 74220 X-RAY XM ESOPHAGUS 1CNTRST: CPT

## 2017-01-01 PROCEDURE — 84466 ASSAY OF TRANSFERRIN: CPT | Performed by: INTERNAL MEDICINE

## 2017-01-01 PROCEDURE — 83540 ASSAY OF IRON: CPT | Performed by: INTERNAL MEDICINE

## 2017-01-01 PROCEDURE — 70450 CT HEAD/BRAIN W/O DYE: CPT

## 2017-01-01 PROCEDURE — 99223 1ST HOSP IP/OBS HIGH 75: CPT | Performed by: INTERNAL MEDICINE

## 2017-01-01 PROCEDURE — 25010000002 PALONOSETRON PER 25 MCG: Performed by: NURSE PRACTITIONER

## 2017-01-01 PROCEDURE — 78815 PET IMAGE W/CT SKULL-THIGH: CPT

## 2017-01-01 PROCEDURE — 25010000002 LINEZOLID 600 MG/300ML SOLUTION: Performed by: INTERNAL MEDICINE

## 2017-01-01 PROCEDURE — 71010 HC CHEST PA OR AP: CPT

## 2017-01-01 PROCEDURE — 74150 CT ABDOMEN W/O CONTRAST: CPT

## 2017-01-01 PROCEDURE — 36415 COLL VENOUS BLD VENIPUNCTURE: CPT | Performed by: EMERGENCY MEDICINE

## 2017-01-01 PROCEDURE — 97162 PT EVAL MOD COMPLEX 30 MIN: CPT

## 2017-01-01 PROCEDURE — 84100 ASSAY OF PHOSPHORUS: CPT

## 2017-01-01 PROCEDURE — 25010000002 DEXAMETHASONE SODIUM PHOSPHATE 10 MG/ML SOLUTION 1 ML VIAL: Performed by: INTERNAL MEDICINE

## 2017-01-01 PROCEDURE — 93005 ELECTROCARDIOGRAM TRACING: CPT | Performed by: EMERGENCY MEDICINE

## 2017-01-01 PROCEDURE — 94640 AIRWAY INHALATION TREATMENT: CPT

## 2017-01-01 PROCEDURE — 0 GADOBENATE DIMEGLUMINE 529 MG/ML SOLUTION: Performed by: RADIOLOGY

## 2017-01-01 PROCEDURE — 0 FLUDEOXYGLUCOSE F18 SOLUTION: Performed by: INTERNAL MEDICINE

## 2017-01-01 PROCEDURE — 87040 BLOOD CULTURE FOR BACTERIA: CPT | Performed by: INTERNAL MEDICINE

## 2017-01-01 PROCEDURE — 96372 THER/PROPH/DIAG INJ SC/IM: CPT | Performed by: INTERNAL MEDICINE

## 2017-01-01 PROCEDURE — 99231 SBSQ HOSP IP/OBS SF/LOW 25: CPT | Performed by: INTERNAL MEDICINE

## 2017-01-01 PROCEDURE — 82728 ASSAY OF FERRITIN: CPT | Performed by: INTERNAL MEDICINE

## 2017-01-01 PROCEDURE — 0 GADOBENATE DIMEGLUMINE 529 MG/ML SOLUTION: Performed by: INTERNAL MEDICINE

## 2017-01-01 PROCEDURE — 84439 ASSAY OF FREE THYROXINE: CPT | Performed by: INTERNAL MEDICINE

## 2017-01-01 PROCEDURE — 72156 MRI NECK SPINE W/O & W/DYE: CPT

## 2017-01-01 PROCEDURE — A9577 INJ MULTIHANCE: HCPCS | Performed by: RADIOLOGY

## 2017-01-01 PROCEDURE — 25010000002 DENOSUMAB 120 MG/1.7ML SOLUTION: Performed by: INTERNAL MEDICINE

## 2017-01-01 PROCEDURE — 96375 TX/PRO/DX INJ NEW DRUG ADDON: CPT | Performed by: NURSE PRACTITIONER

## 2017-01-01 PROCEDURE — 77427 RADIATION TX MANAGEMENT X5: CPT | Performed by: RADIOLOGY

## 2017-01-01 PROCEDURE — 84443 ASSAY THYROID STIM HORMONE: CPT

## 2017-01-01 PROCEDURE — G6002 STEREOSCOPIC X-RAY GUIDANCE: HCPCS | Performed by: RADIOLOGY

## 2017-01-01 PROCEDURE — 87081 CULTURE SCREEN ONLY: CPT | Performed by: INTERNAL MEDICINE

## 2017-01-01 PROCEDURE — 99214 OFFICE O/P EST MOD 30 MIN: CPT | Performed by: RADIOLOGY

## 2017-01-01 PROCEDURE — 99214 OFFICE O/P EST MOD 30 MIN: CPT | Performed by: FAMILY MEDICINE

## 2017-01-01 PROCEDURE — A9577 INJ MULTIHANCE: HCPCS | Performed by: INTERNAL MEDICINE

## 2017-01-01 PROCEDURE — 82607 VITAMIN B-12: CPT | Performed by: INTERNAL MEDICINE

## 2017-01-01 PROCEDURE — 92610 EVALUATE SWALLOWING FUNCTION: CPT | Performed by: SPEECH-LANGUAGE PATHOLOGIST

## 2017-01-01 PROCEDURE — 87086 URINE CULTURE/COLONY COUNT: CPT | Performed by: EMERGENCY MEDICINE

## 2017-01-01 PROCEDURE — 25010000002 NIVOLUMAB 40 MG/4ML SOLUTION 10 ML VIAL: Performed by: INTERNAL MEDICINE

## 2017-01-01 PROCEDURE — 85610 PROTHROMBIN TIME: CPT | Performed by: EMERGENCY MEDICINE

## 2017-01-01 PROCEDURE — 83735 ASSAY OF MAGNESIUM: CPT

## 2017-01-01 PROCEDURE — 25010000002 VANCOMYCIN PER 500 MG: Performed by: INTERNAL MEDICINE

## 2017-01-01 PROCEDURE — 83880 ASSAY OF NATRIURETIC PEPTIDE: CPT | Performed by: EMERGENCY MEDICINE

## 2017-01-01 PROCEDURE — 77470 SPECIAL RADIATION TREATMENT: CPT | Performed by: RADIOLOGY

## 2017-01-01 PROCEDURE — 96413 CHEMO IV INFUSION 1 HR: CPT | Performed by: NURSE PRACTITIONER

## 2017-01-01 PROCEDURE — 25010000002 LORAZEPAM PER 2 MG: Performed by: INTERNAL MEDICINE

## 2017-01-01 PROCEDURE — 82565 ASSAY OF CREATININE: CPT

## 2017-01-01 PROCEDURE — 77370 RADIATION PHYSICS CONSULT: CPT | Performed by: RADIOLOGY

## 2017-01-01 PROCEDURE — 84145 PROCALCITONIN (PCT): CPT | Performed by: INTERNAL MEDICINE

## 2017-01-01 RX ORDER — FAMOTIDINE 10 MG/ML
20 INJECTION, SOLUTION INTRAVENOUS ONCE
Status: CANCELLED | OUTPATIENT
Start: 2017-01-01

## 2017-01-01 RX ORDER — LORAZEPAM 2 MG/ML
2 CONCENTRATE ORAL
Status: DISCONTINUED | OUTPATIENT
Start: 2017-01-01 | End: 2017-01-01 | Stop reason: HOSPADM

## 2017-01-01 RX ORDER — AMOXICILLIN AND CLAVULANATE POTASSIUM 875; 125 MG/1; MG/1
1 TABLET, FILM COATED ORAL 2 TIMES DAILY
Qty: 22 TABLET | Refills: 0 | Status: SHIPPED | OUTPATIENT
Start: 2017-01-01 | End: 2017-01-01

## 2017-01-01 RX ORDER — METOPROLOL TARTRATE 50 MG/1
50 TABLET, FILM COATED ORAL 2 TIMES DAILY
Qty: 180 TABLET | Refills: 3 | Status: SHIPPED | OUTPATIENT
Start: 2017-01-01

## 2017-01-01 RX ORDER — LORAZEPAM 2 MG/ML
1 INJECTION INTRAMUSCULAR
Status: DISCONTINUED | OUTPATIENT
Start: 2017-01-01 | End: 2017-01-01 | Stop reason: HOSPADM

## 2017-01-01 RX ORDER — ONDANSETRON 2 MG/ML
4 INJECTION INTRAMUSCULAR; INTRAVENOUS EVERY 6 HOURS PRN
Status: DISCONTINUED | OUTPATIENT
Start: 2017-01-01 | End: 2017-01-01

## 2017-01-01 RX ORDER — SODIUM CHLORIDE 9 MG/ML
250 INJECTION, SOLUTION INTRAVENOUS ONCE
Status: CANCELLED | OUTPATIENT
Start: 2017-01-01

## 2017-01-01 RX ORDER — ACETAMINOPHEN 325 MG/1
650 TABLET ORAL EVERY 4 HOURS PRN
Status: DISCONTINUED | OUTPATIENT
Start: 2017-01-01 | End: 2017-01-01 | Stop reason: HOSPADM

## 2017-01-01 RX ORDER — PANTOPRAZOLE SODIUM 40 MG/1
40 TABLET, DELAYED RELEASE ORAL 2 TIMES DAILY
Status: DISCONTINUED | OUTPATIENT
Start: 2017-01-01 | End: 2017-01-01

## 2017-01-01 RX ORDER — LORAZEPAM 2 MG/ML
0.5 CONCENTRATE ORAL
Status: DISCONTINUED | OUTPATIENT
Start: 2017-01-01 | End: 2017-01-01 | Stop reason: HOSPADM

## 2017-01-01 RX ORDER — ACETAMINOPHEN 325 MG/1
650 TABLET ORAL EVERY 4 HOURS PRN
Status: DISCONTINUED | OUTPATIENT
Start: 2017-01-01 | End: 2017-01-01

## 2017-01-01 RX ORDER — METOPROLOL TARTRATE 100 MG/1
TABLET ORAL
Qty: 180 TABLET | Refills: 1 | Status: SHIPPED | OUTPATIENT
Start: 2017-01-01 | End: 2017-01-01

## 2017-01-01 RX ORDER — IPRATROPIUM BROMIDE AND ALBUTEROL SULFATE 2.5; .5 MG/3ML; MG/3ML
3 SOLUTION RESPIRATORY (INHALATION)
Status: DISCONTINUED | OUTPATIENT
Start: 2017-01-01 | End: 2017-01-01

## 2017-01-01 RX ORDER — SODIUM CHLORIDE 0.9 % (FLUSH) 0.9 %
10 SYRINGE (ML) INJECTION AS NEEDED
Status: DISCONTINUED | OUTPATIENT
Start: 2017-01-01 | End: 2017-01-01

## 2017-01-01 RX ORDER — PROMETHAZINE HYDROCHLORIDE 25 MG/1
6.25 TABLET ORAL EVERY 4 HOURS PRN
Status: DISCONTINUED | OUTPATIENT
Start: 2017-01-01 | End: 2017-01-01 | Stop reason: HOSPADM

## 2017-01-01 RX ORDER — PALONOSETRON 0.05 MG/ML
0.25 INJECTION, SOLUTION INTRAVENOUS ONCE
Status: COMPLETED | OUTPATIENT
Start: 2017-01-01 | End: 2017-01-01

## 2017-01-01 RX ORDER — ACETAMINOPHEN 650 MG/1
650 SUPPOSITORY RECTAL EVERY 4 HOURS PRN
Status: DISCONTINUED | OUTPATIENT
Start: 2017-01-01 | End: 2017-01-01 | Stop reason: HOSPADM

## 2017-01-01 RX ORDER — LINEZOLID 2 MG/ML
600 INJECTION, SOLUTION INTRAVENOUS EVERY 12 HOURS SCHEDULED
Status: DISCONTINUED | OUTPATIENT
Start: 2017-01-01 | End: 2017-01-01

## 2017-01-01 RX ORDER — DIPHENOXYLATE HYDROCHLORIDE AND ATROPINE SULFATE 2.5; .025 MG/1; MG/1
1 TABLET ORAL
Status: DISCONTINUED | OUTPATIENT
Start: 2017-01-01 | End: 2017-01-01 | Stop reason: HOSPADM

## 2017-01-01 RX ORDER — METOPROLOL TARTRATE 50 MG/1
50 TABLET, FILM COATED ORAL 2 TIMES DAILY
Status: DISCONTINUED | OUTPATIENT
Start: 2017-01-01 | End: 2017-01-01

## 2017-01-01 RX ORDER — LORAZEPAM 0.5 MG/1
0.5 TABLET ORAL
Status: DISCONTINUED | OUTPATIENT
Start: 2017-01-01 | End: 2017-01-01 | Stop reason: HOSPADM

## 2017-01-01 RX ORDER — LORAZEPAM 2 MG/ML
0.5 INJECTION INTRAMUSCULAR
Status: DISCONTINUED | OUTPATIENT
Start: 2017-01-01 | End: 2017-01-01 | Stop reason: HOSPADM

## 2017-01-01 RX ORDER — SODIUM CHLORIDE 9 MG/ML
250 INJECTION, SOLUTION INTRAVENOUS ONCE
Status: COMPLETED | OUTPATIENT
Start: 2017-01-01 | End: 2017-01-01

## 2017-01-01 RX ORDER — HEPARIN SODIUM (PORCINE) LOCK FLUSH IV SOLN 100 UNIT/ML 100 UNIT/ML
500 SOLUTION INTRAVENOUS AS NEEDED
Status: CANCELLED | OUTPATIENT
Start: 2017-01-01

## 2017-01-01 RX ORDER — GLYCOPYRROLATE 0.2 MG/ML
0.4 INJECTION INTRAMUSCULAR; INTRAVENOUS
Status: DISCONTINUED | OUTPATIENT
Start: 2017-01-01 | End: 2017-01-01 | Stop reason: HOSPADM

## 2017-01-01 RX ORDER — SODIUM CHLORIDE, SODIUM LACTATE, POTASSIUM CHLORIDE, CALCIUM CHLORIDE 600; 310; 30; 20 MG/100ML; MG/100ML; MG/100ML; MG/100ML
100 INJECTION, SOLUTION INTRAVENOUS CONTINUOUS
Status: DISCONTINUED | OUTPATIENT
Start: 2017-01-01 | End: 2017-01-01

## 2017-01-01 RX ORDER — MAGNESIUM SULFATE HEPTAHYDRATE 40 MG/ML
2 INJECTION, SOLUTION INTRAVENOUS AS NEEDED
Status: DISCONTINUED | OUTPATIENT
Start: 2017-01-01 | End: 2017-01-01 | Stop reason: HOSPADM

## 2017-01-01 RX ORDER — FAMOTIDINE 10 MG/ML
20 INJECTION, SOLUTION INTRAVENOUS ONCE
Status: COMPLETED | OUTPATIENT
Start: 2017-01-01 | End: 2017-01-01

## 2017-01-01 RX ORDER — POTASSIUM CHLORIDE 750 MG/1
40 CAPSULE, EXTENDED RELEASE ORAL AS NEEDED
Status: DISCONTINUED | OUTPATIENT
Start: 2017-01-01 | End: 2017-01-01 | Stop reason: HOSPADM

## 2017-01-01 RX ORDER — HYDROCODONE BITARTRATE AND ACETAMINOPHEN 5; 325 MG/1; MG/1
1 TABLET ORAL EVERY 4 HOURS PRN
Qty: 150 TABLET | Refills: 0 | Status: SHIPPED | OUTPATIENT
Start: 2017-01-01 | End: 2017-01-01 | Stop reason: DRUGHIGH

## 2017-01-01 RX ORDER — BISACODYL 10 MG
10 SUPPOSITORY, RECTAL RECTAL DAILY
Status: DISCONTINUED | OUTPATIENT
Start: 2017-01-01 | End: 2017-01-01

## 2017-01-01 RX ORDER — SODIUM CHLORIDE 0.9 % (FLUSH) 0.9 %
10 SYRINGE (ML) INJECTION AS NEEDED
Status: CANCELLED | OUTPATIENT
Start: 2017-01-01

## 2017-01-01 RX ORDER — HYDROCODONE BITARTRATE AND ACETAMINOPHEN 10; 325 MG/1; MG/1
1 TABLET ORAL EVERY 4 HOURS PRN
Qty: 180 TABLET | Refills: 0 | Status: SHIPPED | OUTPATIENT
Start: 2017-01-01

## 2017-01-01 RX ORDER — AMOXICILLIN AND CLAVULANATE POTASSIUM 875; 125 MG/1; MG/1
1 TABLET, FILM COATED ORAL 2 TIMES DAILY
Qty: 20 TABLET | Refills: 0 | Status: SHIPPED | OUTPATIENT
Start: 2017-01-01

## 2017-01-01 RX ORDER — ONDANSETRON 2 MG/ML
4 INJECTION INTRAMUSCULAR; INTRAVENOUS EVERY 6 HOURS PRN
Status: DISCONTINUED | OUTPATIENT
Start: 2017-01-01 | End: 2017-01-01 | Stop reason: HOSPADM

## 2017-01-01 RX ORDER — ACETAMINOPHEN 500 MG
TABLET ORAL
Status: COMPLETED
Start: 2017-01-01 | End: 2017-01-01

## 2017-01-01 RX ORDER — PANTOPRAZOLE SODIUM 40 MG/1
40 TABLET, DELAYED RELEASE ORAL 2 TIMES DAILY
Qty: 60 TABLET | Refills: 0 | Status: SHIPPED | OUTPATIENT
Start: 2017-01-01 | End: 2017-01-01 | Stop reason: SDUPTHER

## 2017-01-01 RX ORDER — SCOLOPAMINE TRANSDERMAL SYSTEM 1 MG/1
1 PATCH, EXTENDED RELEASE TRANSDERMAL
Status: DISCONTINUED | OUTPATIENT
Start: 2017-01-01 | End: 2017-01-01 | Stop reason: HOSPADM

## 2017-01-01 RX ORDER — TAMSULOSIN HYDROCHLORIDE 0.4 MG/1
0.4 CAPSULE ORAL DAILY
Status: DISCONTINUED | OUTPATIENT
Start: 2017-01-01 | End: 2017-01-01

## 2017-01-01 RX ORDER — METOPROLOL TARTRATE 50 MG/1
50 TABLET, FILM COATED ORAL 2 TIMES DAILY
Status: DISCONTINUED | OUTPATIENT
Start: 2017-01-01 | End: 2017-01-01 | Stop reason: HOSPADM

## 2017-01-01 RX ORDER — POLYETHYLENE GLYCOL 3350 17 G/17G
17 POWDER, FOR SOLUTION ORAL DAILY
Status: DISCONTINUED | OUTPATIENT
Start: 2017-01-01 | End: 2017-01-01 | Stop reason: HOSPADM

## 2017-01-01 RX ORDER — TAMSULOSIN HYDROCHLORIDE 0.4 MG/1
1 CAPSULE ORAL DAILY
Qty: 90 CAPSULE | Refills: 1 | Status: SHIPPED | OUTPATIENT
Start: 2017-01-01

## 2017-01-01 RX ORDER — BISACODYL 10 MG
10 SUPPOSITORY, RECTAL RECTAL DAILY PRN
Status: DISCONTINUED | OUTPATIENT
Start: 2017-01-01 | End: 2017-01-01 | Stop reason: HOSPADM

## 2017-01-01 RX ORDER — FINASTERIDE 5 MG/1
5 TABLET, FILM COATED ORAL DAILY
Status: DISCONTINUED | OUTPATIENT
Start: 2017-01-01 | End: 2017-01-01 | Stop reason: HOSPADM

## 2017-01-01 RX ORDER — PROMETHAZINE HYDROCHLORIDE 6.25 MG/5ML
6.25 SYRUP ORAL EVERY 4 HOURS PRN
Status: DISCONTINUED | OUTPATIENT
Start: 2017-01-01 | End: 2017-01-01 | Stop reason: HOSPADM

## 2017-01-01 RX ORDER — ONDANSETRON HYDROCHLORIDE 8 MG/1
8 TABLET, FILM COATED ORAL EVERY 8 HOURS PRN
Qty: 60 TABLET | Refills: 2 | Status: SHIPPED | OUTPATIENT
Start: 2017-01-01 | End: 2017-01-01

## 2017-01-01 RX ORDER — SODIUM CHLORIDE 0.9 % (FLUSH) 0.9 %
10 SYRINGE (ML) INJECTION AS NEEDED
Status: DISCONTINUED | OUTPATIENT
Start: 2017-01-01 | End: 2017-01-01 | Stop reason: HOSPADM

## 2017-01-01 RX ORDER — POTASSIUM CHLORIDE 750 MG/1
TABLET, EXTENDED RELEASE ORAL
Qty: 90 TABLET | Refills: 1 | Status: SHIPPED | OUTPATIENT
Start: 2017-01-01

## 2017-01-01 RX ORDER — LORAZEPAM 2 MG/ML
2 INJECTION INTRAMUSCULAR
Status: DISCONTINUED | OUTPATIENT
Start: 2017-01-01 | End: 2017-01-01 | Stop reason: HOSPADM

## 2017-01-01 RX ORDER — POTASSIUM CHLORIDE 750 MG/1
10 CAPSULE, EXTENDED RELEASE ORAL DAILY
Status: DISCONTINUED | OUTPATIENT
Start: 2017-01-01 | End: 2017-01-01

## 2017-01-01 RX ORDER — PROMETHAZINE HYDROCHLORIDE 12.5 MG/1
6.25 SUPPOSITORY RECTAL EVERY 4 HOURS PRN
Status: DISCONTINUED | OUTPATIENT
Start: 2017-01-01 | End: 2017-01-01 | Stop reason: HOSPADM

## 2017-01-01 RX ORDER — MORPHINE SULFATE 100 MG/5ML
10 SOLUTION ORAL
Status: DISCONTINUED | OUTPATIENT
Start: 2017-01-01 | End: 2017-01-01 | Stop reason: HOSPADM

## 2017-01-01 RX ORDER — PANTOPRAZOLE SODIUM 40 MG/1
40 TABLET, DELAYED RELEASE ORAL 2 TIMES DAILY
Qty: 60 TABLET | Refills: 0 | Status: SHIPPED | OUTPATIENT
Start: 2017-01-01 | End: 2017-01-01

## 2017-01-01 RX ORDER — HALOPERIDOL 1 MG/1
1 TABLET ORAL EVERY 4 HOURS PRN
Status: DISCONTINUED | OUTPATIENT
Start: 2017-01-01 | End: 2017-01-01 | Stop reason: HOSPADM

## 2017-01-01 RX ORDER — PALONOSETRON 0.05 MG/ML
0.25 INJECTION, SOLUTION INTRAVENOUS ONCE
Status: CANCELLED | OUTPATIENT
Start: 2017-01-01

## 2017-01-01 RX ORDER — SODIUM CHLORIDE 0.9 % (FLUSH) 0.9 %
1-10 SYRINGE (ML) INJECTION AS NEEDED
Status: DISCONTINUED | OUTPATIENT
Start: 2017-01-01 | End: 2017-01-01

## 2017-01-01 RX ORDER — ONDANSETRON HYDROCHLORIDE 8 MG/1
8 TABLET, FILM COATED ORAL EVERY 8 HOURS PRN
Qty: 60 TABLET | Refills: 2 | Status: SHIPPED | OUTPATIENT
Start: 2017-01-01

## 2017-01-01 RX ORDER — HYDROCODONE BITARTRATE AND ACETAMINOPHEN 10; 325 MG/1; MG/1
1 TABLET ORAL EVERY 4 HOURS PRN
Status: DISCONTINUED | OUTPATIENT
Start: 2017-01-01 | End: 2017-01-01

## 2017-01-01 RX ORDER — FINASTERIDE 5 MG/1
5 TABLET, FILM COATED ORAL DAILY
Status: DISCONTINUED | OUTPATIENT
Start: 2017-01-01 | End: 2017-01-01

## 2017-01-01 RX ORDER — ACETAMINOPHEN 160 MG/5ML
650 SOLUTION ORAL EVERY 4 HOURS PRN
Status: DISCONTINUED | OUTPATIENT
Start: 2017-01-01 | End: 2017-01-01 | Stop reason: HOSPADM

## 2017-01-01 RX ORDER — IPRATROPIUM BROMIDE AND ALBUTEROL SULFATE 2.5; .5 MG/3ML; MG/3ML
3 SOLUTION RESPIRATORY (INHALATION) EVERY 4 HOURS PRN
Status: DISCONTINUED | OUTPATIENT
Start: 2017-01-01 | End: 2017-01-01

## 2017-01-01 RX ORDER — FUROSEMIDE 40 MG/1
TABLET ORAL
Qty: 30 TABLET | Refills: 0 | OUTPATIENT
Start: 2017-01-01

## 2017-01-01 RX ORDER — DILTIAZEM HYDROCHLORIDE 300 MG/1
300 CAPSULE, COATED, EXTENDED RELEASE ORAL
Status: DISCONTINUED | OUTPATIENT
Start: 2017-01-01 | End: 2017-01-01 | Stop reason: HOSPADM

## 2017-01-01 RX ORDER — MAGNESIUM SULFATE HEPTAHYDRATE 40 MG/ML
4 INJECTION, SOLUTION INTRAVENOUS AS NEEDED
Status: DISCONTINUED | OUTPATIENT
Start: 2017-01-01 | End: 2017-01-01 | Stop reason: HOSPADM

## 2017-01-01 RX ORDER — HALOPERIDOL 2 MG/ML
1 SOLUTION ORAL EVERY 4 HOURS PRN
Status: DISCONTINUED | OUTPATIENT
Start: 2017-01-01 | End: 2017-01-01 | Stop reason: HOSPADM

## 2017-01-01 RX ORDER — MORPHINE SULFATE 2 MG/ML
2 INJECTION, SOLUTION INTRAMUSCULAR; INTRAVENOUS
Status: DISCONTINUED | OUTPATIENT
Start: 2017-01-01 | End: 2017-01-01 | Stop reason: HOSPADM

## 2017-01-01 RX ORDER — PANTOPRAZOLE SODIUM 40 MG/1
40 TABLET, DELAYED RELEASE ORAL 2 TIMES DAILY
Qty: 180 TABLET | Refills: 3 | Status: SHIPPED | OUTPATIENT
Start: 2017-01-01

## 2017-01-01 RX ORDER — HALOPERIDOL 5 MG/ML
1 INJECTION INTRAMUSCULAR EVERY 4 HOURS PRN
Status: DISCONTINUED | OUTPATIENT
Start: 2017-01-01 | End: 2017-01-01 | Stop reason: HOSPADM

## 2017-01-01 RX ORDER — FINASTERIDE 5 MG/1
5 TABLET, FILM COATED ORAL DAILY
Qty: 90 TABLET | Refills: 1 | Status: SHIPPED | OUTPATIENT
Start: 2017-01-01

## 2017-01-01 RX ORDER — VANCOMYCIN HYDROCHLORIDE 1 G/200ML
1000 INJECTION, SOLUTION INTRAVENOUS EVERY 12 HOURS
Status: DISCONTINUED | OUTPATIENT
Start: 2017-01-01 | End: 2017-01-01

## 2017-01-01 RX ORDER — TAMSULOSIN HYDROCHLORIDE 0.4 MG/1
0.4 CAPSULE ORAL DAILY
Status: DISCONTINUED | OUTPATIENT
Start: 2017-01-01 | End: 2017-01-01 | Stop reason: HOSPADM

## 2017-01-01 RX ORDER — FUROSEMIDE 40 MG/1
40 TABLET ORAL DAILY
Qty: 90 TABLET | Refills: 1 | Status: SHIPPED | OUTPATIENT
Start: 2017-01-01

## 2017-01-01 RX ORDER — POLYETHYLENE GLYCOL 3350 17 G/17G
17 POWDER, FOR SOLUTION ORAL DAILY
Status: DISCONTINUED | OUTPATIENT
Start: 2017-01-01 | End: 2017-01-01

## 2017-01-01 RX ORDER — MORPHINE SULFATE 2 MG/ML
1 INJECTION, SOLUTION INTRAMUSCULAR; INTRAVENOUS EVERY 4 HOURS PRN
Status: DISCONTINUED | OUTPATIENT
Start: 2017-01-01 | End: 2017-01-01

## 2017-01-01 RX ORDER — DILTIAZEM HYDROCHLORIDE 300 MG/1
300 CAPSULE, COATED, EXTENDED RELEASE ORAL
Status: DISCONTINUED | OUTPATIENT
Start: 2017-01-01 | End: 2017-01-01

## 2017-01-01 RX ORDER — PROMETHAZINE HYDROCHLORIDE 25 MG/ML
6.25 INJECTION, SOLUTION INTRAMUSCULAR; INTRAVENOUS EVERY 4 HOURS PRN
Status: DISCONTINUED | OUTPATIENT
Start: 2017-01-01 | End: 2017-01-01 | Stop reason: HOSPADM

## 2017-01-01 RX ORDER — LANOLIN ALCOHOL/MO/W.PET/CERES
400 CREAM (GRAM) TOPICAL DAILY
Status: DISCONTINUED | OUTPATIENT
Start: 2017-01-01 | End: 2017-01-01

## 2017-01-01 RX ORDER — NAPROXEN 375 MG/1
375 TABLET ORAL
COMMUNITY
End: 2017-01-01

## 2017-01-01 RX ORDER — POTASSIUM CHLORIDE 1.5 G/1.77G
40 POWDER, FOR SOLUTION ORAL AS NEEDED
Status: DISCONTINUED | OUTPATIENT
Start: 2017-01-01 | End: 2017-01-01 | Stop reason: HOSPADM

## 2017-01-01 RX ORDER — ALBUTEROL SULFATE 90 UG/1
6 AEROSOL, METERED RESPIRATORY (INHALATION)
Status: DISCONTINUED | OUTPATIENT
Start: 2017-01-01 | End: 2017-01-01

## 2017-01-01 RX ORDER — LORAZEPAM 2 MG/ML
1 CONCENTRATE ORAL
Status: DISCONTINUED | OUTPATIENT
Start: 2017-01-01 | End: 2017-01-01 | Stop reason: HOSPADM

## 2017-01-01 RX ORDER — METRONIDAZOLE 500 MG/1
500 TABLET ORAL 3 TIMES DAILY
Qty: 30 TABLET | Refills: 0 | Status: SHIPPED | OUTPATIENT
Start: 2017-01-01 | End: 2017-01-01

## 2017-01-01 RX ORDER — ALBUTEROL SULFATE 2.5 MG/3ML
2.5 SOLUTION RESPIRATORY (INHALATION)
Status: COMPLETED | OUTPATIENT
Start: 2017-01-01 | End: 2017-01-01

## 2017-01-01 RX ORDER — MORPHINE SULFATE 100 MG/5ML
5 SOLUTION ORAL
Status: DISCONTINUED | OUTPATIENT
Start: 2017-01-01 | End: 2017-01-01 | Stop reason: HOSPADM

## 2017-01-01 RX ORDER — LORAZEPAM 1 MG/1
2 TABLET ORAL
Status: DISCONTINUED | OUTPATIENT
Start: 2017-01-01 | End: 2017-01-01 | Stop reason: HOSPADM

## 2017-01-01 RX ORDER — AMOXICILLIN AND CLAVULANATE POTASSIUM 875; 125 MG/1; MG/1
1 TABLET, FILM COATED ORAL 2 TIMES DAILY
Qty: 8 TABLET | Refills: 0 | Status: SHIPPED | OUTPATIENT
Start: 2017-01-01 | End: 2017-01-01

## 2017-01-01 RX ORDER — ACETAMINOPHEN 500 MG
1000 TABLET ORAL ONCE
Status: COMPLETED | OUTPATIENT
Start: 2017-01-01 | End: 2017-01-01

## 2017-01-01 RX ORDER — IPRATROPIUM BROMIDE AND ALBUTEROL SULFATE 2.5; .5 MG/3ML; MG/3ML
3 SOLUTION RESPIRATORY (INHALATION)
Status: DISCONTINUED | OUTPATIENT
Start: 2017-01-01 | End: 2017-01-01 | Stop reason: HOSPADM

## 2017-01-01 RX ORDER — POLYETHYLENE GLYCOL 3350 17 G/17G
17 POWDER, FOR SOLUTION ORAL NIGHTLY
Status: DISCONTINUED | OUTPATIENT
Start: 2017-01-01 | End: 2017-01-01

## 2017-01-01 RX ORDER — FUROSEMIDE 40 MG/1
40 TABLET ORAL DAILY
Qty: 30 TABLET | Refills: 0 | Status: SHIPPED | OUTPATIENT
Start: 2017-01-01 | End: 2017-01-01 | Stop reason: SDUPTHER

## 2017-01-01 RX ORDER — LORAZEPAM 1 MG/1
1 TABLET ORAL
Status: DISCONTINUED | OUTPATIENT
Start: 2017-01-01 | End: 2017-01-01 | Stop reason: HOSPADM

## 2017-01-01 RX ORDER — FUROSEMIDE 40 MG/1
40 TABLET ORAL DAILY
Status: DISCONTINUED | OUTPATIENT
Start: 2017-01-01 | End: 2017-01-01

## 2017-01-01 RX ORDER — FUROSEMIDE 40 MG/1
40 TABLET ORAL DAILY
Qty: 90 TABLET | Refills: 1 | Status: SHIPPED | OUTPATIENT
Start: 2017-01-01 | End: 2017-01-01 | Stop reason: SDUPTHER

## 2017-01-01 RX ORDER — POTASSIUM CHLORIDE 7.45 MG/ML
10 INJECTION INTRAVENOUS
Status: DISCONTINUED | OUTPATIENT
Start: 2017-01-01 | End: 2017-01-01 | Stop reason: HOSPADM

## 2017-01-01 RX ORDER — MORPHINE SULFATE 2 MG/ML
2 INJECTION, SOLUTION INTRAMUSCULAR; INTRAVENOUS EVERY 4 HOURS PRN
Status: DISCONTINUED | OUTPATIENT
Start: 2017-01-01 | End: 2017-01-01

## 2017-01-01 RX ORDER — SODIUM CHLORIDE 0.9 % (FLUSH) 0.9 %
1-10 SYRINGE (ML) INJECTION AS NEEDED
Status: DISCONTINUED | OUTPATIENT
Start: 2017-01-01 | End: 2017-01-01 | Stop reason: HOSPADM

## 2017-01-01 RX ADMIN — FLUDEOXYGLUCOSE F18 1 DOSE: 300 INJECTION INTRAVENOUS at 07:42

## 2017-01-01 RX ADMIN — IPRATROPIUM BROMIDE AND ALBUTEROL SULFATE 3 ML: .5; 3 SOLUTION RESPIRATORY (INHALATION) at 15:17

## 2017-01-01 RX ADMIN — IPRATROPIUM BROMIDE AND ALBUTEROL SULFATE 3 ML: .5; 3 SOLUTION RESPIRATORY (INHALATION) at 19:55

## 2017-01-01 RX ADMIN — METOPROLOL TARTRATE 50 MG: 50 TABLET ORAL at 08:26

## 2017-01-01 RX ADMIN — IPRATROPIUM BROMIDE AND ALBUTEROL SULFATE 3 ML: .5; 3 SOLUTION RESPIRATORY (INHALATION) at 19:45

## 2017-01-01 RX ADMIN — HYDROCODONE BITARTRATE AND ACETAMINOPHEN 1 TABLET: 10; 325 TABLET ORAL at 00:24

## 2017-01-01 RX ADMIN — HYDROCODONE BITARTRATE AND ACETAMINOPHEN 1 TABLET: 10; 325 TABLET ORAL at 11:50

## 2017-01-01 RX ADMIN — SODIUM CHLORIDE 250 ML: 900 INJECTION, SOLUTION INTRAVENOUS at 10:10

## 2017-01-01 RX ADMIN — ACETAMINOPHEN 650 MG: 325 TABLET ORAL at 20:38

## 2017-01-01 RX ADMIN — IPRATROPIUM BROMIDE AND ALBUTEROL SULFATE 3 ML: .5; 3 SOLUTION RESPIRATORY (INHALATION) at 12:53

## 2017-01-01 RX ADMIN — CARBOPLATIN 180 MG: 10 INJECTION, SOLUTION INTRAVENOUS at 11:28

## 2017-01-01 RX ADMIN — VANCOMYCIN HYDROCHLORIDE 1000 MG: 1 INJECTION, SOLUTION INTRAVENOUS at 21:01

## 2017-01-01 RX ADMIN — LINEZOLID 600 MG: 600 INJECTION, SOLUTION INTRAVENOUS at 09:09

## 2017-01-01 RX ADMIN — CARBOPLATIN 180 MG: 10 INJECTION, SOLUTION INTRAVENOUS at 09:05

## 2017-01-01 RX ADMIN — IPRATROPIUM BROMIDE AND ALBUTEROL SULFATE 3 ML: .5; 3 SOLUTION RESPIRATORY (INHALATION) at 11:17

## 2017-01-01 RX ADMIN — PANTOPRAZOLE SODIUM 40 MG: 40 TABLET, DELAYED RELEASE ORAL at 09:14

## 2017-01-01 RX ADMIN — TAMSULOSIN HYDROCHLORIDE 0.4 MG: 0.4 CAPSULE ORAL at 08:04

## 2017-01-01 RX ADMIN — SODIUM CHLORIDE 250 ML: 900 INJECTION, SOLUTION INTRAVENOUS at 08:18

## 2017-01-01 RX ADMIN — HYDROCODONE BITARTRATE AND ACETAMINOPHEN 1 TABLET: 10; 325 TABLET ORAL at 02:28

## 2017-01-01 RX ADMIN — PALONOSETRON HYDROCHLORIDE 0.25 MG: 0.25 INJECTION INTRAVENOUS at 10:22

## 2017-01-01 RX ADMIN — CARBOPLATIN 150 MG: 10 INJECTION, SOLUTION INTRAVENOUS at 09:37

## 2017-01-01 RX ADMIN — SODIUM CHLORIDE 1000 ML: 9 INJECTION, SOLUTION INTRAVENOUS at 05:44

## 2017-01-01 RX ADMIN — IPRATROPIUM BROMIDE AND ALBUTEROL SULFATE 3 ML: .5; 3 SOLUTION RESPIRATORY (INHALATION) at 00:11

## 2017-01-01 RX ADMIN — BISACODYL 10 MG: 10 SUPPOSITORY RECTAL at 09:49

## 2017-01-01 RX ADMIN — BISACODYL 10 MG: 10 SUPPOSITORY RECTAL at 12:11

## 2017-01-01 RX ADMIN — FINASTERIDE 5 MG: 5 TABLET, FILM COATED ORAL at 10:07

## 2017-01-01 RX ADMIN — FINASTERIDE 5 MG: 5 TABLET, FILM COATED ORAL at 19:04

## 2017-01-01 RX ADMIN — FAMOTIDINE 20 MG: 10 INJECTION, SOLUTION INTRAVENOUS at 09:56

## 2017-01-01 RX ADMIN — POLYETHYLENE GLYCOL 3350 17 G: 17 POWDER, FOR SOLUTION ORAL at 08:42

## 2017-01-01 RX ADMIN — LORAZEPAM 1 MG: 2 INJECTION INTRAMUSCULAR; INTRAVENOUS at 14:36

## 2017-01-01 RX ADMIN — PANTOPRAZOLE SODIUM 40 MG: 40 TABLET, DELAYED RELEASE ORAL at 19:07

## 2017-01-01 RX ADMIN — TAZOBACTAM SODIUM AND PIPERACILLIN SODIUM 3.38 G: 375; 3 INJECTION, SOLUTION INTRAVENOUS at 10:09

## 2017-01-01 RX ADMIN — POTASSIUM CHLORIDE 10 MEQ: 750 CAPSULE, EXTENDED RELEASE ORAL at 19:03

## 2017-01-01 RX ADMIN — MORPHINE SULFATE 4 MG: 4 INJECTION, SOLUTION INTRAMUSCULAR; INTRAVENOUS at 11:40

## 2017-01-01 RX ADMIN — FUROSEMIDE 40 MG: 40 TABLET ORAL at 09:49

## 2017-01-01 RX ADMIN — DILTIAZEM HYDROCHLORIDE 300 MG: 300 CAPSULE, COATED, EXTENDED RELEASE ORAL at 11:56

## 2017-01-01 RX ADMIN — HYDROCODONE BITARTRATE AND ACETAMINOPHEN 1 TABLET: 10; 325 TABLET ORAL at 06:20

## 2017-01-01 RX ADMIN — PALONOSETRON HYDROCHLORIDE 0.25 MG: 0.25 INJECTION INTRAVENOUS at 08:22

## 2017-01-01 RX ADMIN — POTASSIUM CHLORIDE 10 MEQ: 750 CAPSULE, EXTENDED RELEASE ORAL at 09:13

## 2017-01-01 RX ADMIN — TAZOBACTAM SODIUM AND PIPERACILLIN SODIUM 4.5 G: 500; 4 INJECTION, SOLUTION INTRAVENOUS at 05:25

## 2017-01-01 RX ADMIN — PANTOPRAZOLE SODIUM 40 MG: 40 TABLET, DELAYED RELEASE ORAL at 17:07

## 2017-01-01 RX ADMIN — APIXABAN 5 MG: 5 TABLET, FILM COATED ORAL at 17:04

## 2017-01-01 RX ADMIN — IPRATROPIUM BROMIDE AND ALBUTEROL SULFATE 3 ML: .5; 3 SOLUTION RESPIRATORY (INHALATION) at 16:29

## 2017-01-01 RX ADMIN — MORPHINE SULFATE 1 MG: 2 INJECTION, SOLUTION INTRAMUSCULAR; INTRAVENOUS at 02:10

## 2017-01-01 RX ADMIN — DENOSUMAB 120 MG: 120 INJECTION SUBCUTANEOUS at 14:04

## 2017-01-01 RX ADMIN — IPRATROPIUM BROMIDE AND ALBUTEROL SULFATE 3 ML: .5; 3 SOLUTION RESPIRATORY (INHALATION) at 07:22

## 2017-01-01 RX ADMIN — DIPHENHYDRAMINE HYDROCHLORIDE 25 MG: 50 INJECTION, SOLUTION INTRAMUSCULAR; INTRAVENOUS at 08:50

## 2017-01-01 RX ADMIN — FAMOTIDINE 20 MG: 10 INJECTION, SOLUTION INTRAVENOUS at 10:23

## 2017-01-01 RX ADMIN — APIXABAN 5 MG: 5 TABLET, FILM COATED ORAL at 09:51

## 2017-01-01 RX ADMIN — MORPHINE SULFATE 4 MG: 4 INJECTION, SOLUTION INTRAMUSCULAR; INTRAVENOUS at 01:48

## 2017-01-01 RX ADMIN — ACETAMINOPHEN 400 MCG: 400 TABLET ORAL at 19:04

## 2017-01-01 RX ADMIN — METOPROLOL TARTRATE 50 MG: 50 TABLET ORAL at 08:08

## 2017-01-01 RX ADMIN — POLYETHYLENE GLYCOL 3350 17 G: 17 POWDER, FOR SOLUTION ORAL at 22:46

## 2017-01-01 RX ADMIN — FUROSEMIDE 40 MG: 40 TABLET ORAL at 09:46

## 2017-01-01 RX ADMIN — POTASSIUM CHLORIDE 10 MEQ: 750 CAPSULE, EXTENDED RELEASE ORAL at 10:07

## 2017-01-01 RX ADMIN — FINASTERIDE 5 MG: 5 TABLET, FILM COATED ORAL at 08:04

## 2017-01-01 RX ADMIN — PANTOPRAZOLE SODIUM 40 MG: 40 TABLET, DELAYED RELEASE ORAL at 09:49

## 2017-01-01 RX ADMIN — DIPHENHYDRAMINE HYDROCHLORIDE 50 MG: 50 INJECTION, SOLUTION INTRAMUSCULAR; INTRAVENOUS at 10:25

## 2017-01-01 RX ADMIN — DILTIAZEM HYDROCHLORIDE 300 MG: 300 CAPSULE, COATED, EXTENDED RELEASE ORAL at 08:26

## 2017-01-01 RX ADMIN — GLYCOPYRROLATE 0.4 MG: 0.2 INJECTION, SOLUTION INTRAMUSCULAR; INTRAVENOUS at 05:19

## 2017-01-01 RX ADMIN — GLYCOPYRROLATE 0.4 MG: 0.2 INJECTION, SOLUTION INTRAMUSCULAR; INTRAVENOUS at 19:17

## 2017-01-01 RX ADMIN — TAMSULOSIN HYDROCHLORIDE 0.4 MG: 0.4 CAPSULE ORAL at 08:40

## 2017-01-01 RX ADMIN — DILTIAZEM HYDROCHLORIDE 300 MG: 300 CAPSULE, COATED, EXTENDED RELEASE ORAL at 10:07

## 2017-01-01 RX ADMIN — PANTOPRAZOLE SODIUM 40 MG: 40 TABLET, DELAYED RELEASE ORAL at 17:47

## 2017-01-01 RX ADMIN — TAZOBACTAM SODIUM AND PIPERACILLIN SODIUM 3.38 G: 375; 3 INJECTION, SOLUTION INTRAVENOUS at 06:42

## 2017-01-01 RX ADMIN — SODIUM CHLORIDE 250 ML: 900 INJECTION, SOLUTION INTRAVENOUS at 09:56

## 2017-01-01 RX ADMIN — DEXAMETHASONE SODIUM PHOSPHATE 12 MG: 10 INJECTION, SOLUTION INTRAMUSCULAR; INTRAVENOUS at 10:38

## 2017-01-01 RX ADMIN — HYDROCODONE BITARTRATE AND ACETAMINOPHEN 1 TABLET: 10; 325 TABLET ORAL at 12:11

## 2017-01-01 RX ADMIN — IPRATROPIUM BROMIDE AND ALBUTEROL SULFATE 3 ML: .5; 3 SOLUTION RESPIRATORY (INHALATION) at 23:12

## 2017-01-01 RX ADMIN — FUROSEMIDE 40 MG: 40 TABLET ORAL at 10:07

## 2017-01-01 RX ADMIN — APIXABAN 5 MG: 5 TABLET, FILM COATED ORAL at 18:16

## 2017-01-01 RX ADMIN — PALONOSETRON HYDROCHLORIDE 0.25 MG: 0.25 INJECTION INTRAVENOUS at 08:56

## 2017-01-01 RX ADMIN — FUROSEMIDE 40 MG: 40 TABLET ORAL at 19:04

## 2017-01-01 RX ADMIN — APIXABAN 5 MG: 5 TABLET, FILM COATED ORAL at 08:08

## 2017-01-01 RX ADMIN — METOPROLOL TARTRATE 50 MG: 50 TABLET ORAL at 17:42

## 2017-01-01 RX ADMIN — TAMSULOSIN HYDROCHLORIDE 0.4 MG: 0.4 CAPSULE ORAL at 09:14

## 2017-01-01 RX ADMIN — PACLITAXEL 200 MG: 6 INJECTION, SOLUTION INTRAVENOUS at 10:25

## 2017-01-01 RX ADMIN — TAZOBACTAM SODIUM AND PIPERACILLIN SODIUM 3.38 G: 375; 3 INJECTION, SOLUTION INTRAVENOUS at 02:28

## 2017-01-01 RX ADMIN — IPRATROPIUM BROMIDE AND ALBUTEROL SULFATE 3 ML: .5; 3 SOLUTION RESPIRATORY (INHALATION) at 00:02

## 2017-01-01 RX ADMIN — METOPROLOL TARTRATE 50 MG: 50 TABLET ORAL at 19:04

## 2017-01-01 RX ADMIN — TAZOBACTAM SODIUM AND PIPERACILLIN SODIUM 3.38 G: 375; 3 INJECTION, SOLUTION INTRAVENOUS at 17:01

## 2017-01-01 RX ADMIN — APIXABAN 5 MG: 5 TABLET, FILM COATED ORAL at 17:51

## 2017-01-01 RX ADMIN — PALONOSETRON HYDROCHLORIDE 0.25 MG: 0.25 INJECTION INTRAVENOUS at 08:31

## 2017-01-01 RX ADMIN — SODIUM CHLORIDE, POTASSIUM CHLORIDE, SODIUM LACTATE AND CALCIUM CHLORIDE 100 ML/HR: 600; 310; 30; 20 INJECTION, SOLUTION INTRAVENOUS at 19:05

## 2017-01-01 RX ADMIN — POLYETHYLENE GLYCOL 3350 17 G: 17 POWDER, FOR SOLUTION ORAL at 08:02

## 2017-01-01 RX ADMIN — HYDROCODONE BITARTRATE AND ACETAMINOPHEN 1 TABLET: 10; 325 TABLET ORAL at 09:43

## 2017-01-01 RX ADMIN — MORPHINE SULFATE 2 MG: 2 INJECTION, SOLUTION INTRAMUSCULAR; INTRAVENOUS at 13:42

## 2017-01-01 RX ADMIN — IPRATROPIUM BROMIDE AND ALBUTEROL SULFATE 3 ML: .5; 3 SOLUTION RESPIRATORY (INHALATION) at 07:27

## 2017-01-01 RX ADMIN — HYDROCODONE BITARTRATE AND ACETAMINOPHEN 1 TABLET: 10; 325 TABLET ORAL at 20:28

## 2017-01-01 RX ADMIN — TAZOBACTAM SODIUM AND PIPERACILLIN SODIUM 3.38 G: 375; 3 INJECTION, SOLUTION INTRAVENOUS at 10:00

## 2017-01-01 RX ADMIN — IPRATROPIUM BROMIDE AND ALBUTEROL SULFATE 3 ML: .5; 3 SOLUTION RESPIRATORY (INHALATION) at 19:21

## 2017-01-01 RX ADMIN — FINASTERIDE 5 MG: 5 TABLET, FILM COATED ORAL at 09:49

## 2017-01-01 RX ADMIN — IPRATROPIUM BROMIDE AND ALBUTEROL SULFATE 3 ML: .5; 3 SOLUTION RESPIRATORY (INHALATION) at 12:13

## 2017-01-01 RX ADMIN — ACETAMINOPHEN 400 MCG: 400 TABLET ORAL at 09:43

## 2017-01-01 RX ADMIN — METOPROLOL TARTRATE 50 MG: 50 TABLET ORAL at 18:16

## 2017-01-01 RX ADMIN — IPRATROPIUM BROMIDE AND ALBUTEROL SULFATE 3 ML: .5; 3 SOLUTION RESPIRATORY (INHALATION) at 07:34

## 2017-01-01 RX ADMIN — IPRATROPIUM BROMIDE AND ALBUTEROL SULFATE 3 ML: .5; 3 SOLUTION RESPIRATORY (INHALATION) at 11:44

## 2017-01-01 RX ADMIN — CARBOPLATIN 180 MG: 10 INJECTION, SOLUTION INTRAVENOUS at 09:39

## 2017-01-01 RX ADMIN — FUROSEMIDE 40 MG: 40 TABLET ORAL at 08:08

## 2017-01-01 RX ADMIN — MORPHINE SULFATE 10 MG: 100 SOLUTION ORAL at 06:43

## 2017-01-01 RX ADMIN — DILTIAZEM HYDROCHLORIDE 300 MG: 300 CAPSULE, COATED, EXTENDED RELEASE ORAL at 08:08

## 2017-01-01 RX ADMIN — DIPHENHYDRAMINE HYDROCHLORIDE 25 MG: 50 INJECTION, SOLUTION INTRAMUSCULAR; INTRAVENOUS at 09:33

## 2017-01-01 RX ADMIN — PANTOPRAZOLE SODIUM 40 MG: 40 TABLET, DELAYED RELEASE ORAL at 08:08

## 2017-01-01 RX ADMIN — LINEZOLID 600 MG: 600 INJECTION, SOLUTION INTRAVENOUS at 20:06

## 2017-01-01 RX ADMIN — APIXABAN 5 MG: 5 TABLET, FILM COATED ORAL at 17:47

## 2017-01-01 RX ADMIN — HYDROCODONE BITARTRATE AND ACETAMINOPHEN 1 TABLET: 10; 325 TABLET ORAL at 14:30

## 2017-01-01 RX ADMIN — TAZOBACTAM SODIUM AND PIPERACILLIN SODIUM 3.38 G: 375; 3 INJECTION, SOLUTION INTRAVENOUS at 02:30

## 2017-01-01 RX ADMIN — METOPROLOL TARTRATE 50 MG: 50 TABLET ORAL at 08:40

## 2017-01-01 RX ADMIN — TAZOBACTAM SODIUM AND PIPERACILLIN SODIUM 3.38 G: 375; 3 INJECTION, SOLUTION INTRAVENOUS at 22:01

## 2017-01-01 RX ADMIN — IPRATROPIUM BROMIDE AND ALBUTEROL SULFATE 3 ML: .5; 3 SOLUTION RESPIRATORY (INHALATION) at 20:04

## 2017-01-01 RX ADMIN — DEXAMETHASONE SODIUM PHOSPHATE 12 MG: 10 INJECTION INTRAMUSCULAR; INTRAVENOUS at 08:32

## 2017-01-01 RX ADMIN — TAZOBACTAM SODIUM AND PIPERACILLIN SODIUM 3.38 G: 375; 3 INJECTION, SOLUTION INTRAVENOUS at 15:00

## 2017-01-01 RX ADMIN — FAMOTIDINE 20 MG: 10 INJECTION, SOLUTION INTRAVENOUS at 09:14

## 2017-01-01 RX ADMIN — SODIUM CHLORIDE 250 ML: 900 INJECTION, SOLUTION INTRAVENOUS at 08:45

## 2017-01-01 RX ADMIN — IPRATROPIUM BROMIDE AND ALBUTEROL SULFATE 3 ML: .5; 3 SOLUTION RESPIRATORY (INHALATION) at 12:18

## 2017-01-01 RX ADMIN — FAMOTIDINE 20 MG: 10 INJECTION, SOLUTION INTRAVENOUS at 08:29

## 2017-01-01 RX ADMIN — APIXABAN 5 MG: 5 TABLET, FILM COATED ORAL at 09:14

## 2017-01-01 RX ADMIN — TAZOBACTAM SODIUM AND PIPERACILLIN SODIUM 3.38 G: 375; 3 INJECTION, SOLUTION INTRAVENOUS at 13:55

## 2017-01-01 RX ADMIN — LORAZEPAM 1 MG: 2 INJECTION INTRAMUSCULAR; INTRAVENOUS at 01:48

## 2017-01-01 RX ADMIN — DEXAMETHASONE SODIUM PHOSPHATE 12 MG: 10 INJECTION INTRAMUSCULAR; INTRAVENOUS at 10:46

## 2017-01-01 RX ADMIN — GLYCOPYRROLATE 0.4 MG: 0.2 INJECTION, SOLUTION INTRAMUSCULAR; INTRAVENOUS at 01:48

## 2017-01-01 RX ADMIN — FINASTERIDE 5 MG: 5 TABLET, FILM COATED ORAL at 09:50

## 2017-01-01 RX ADMIN — ALBUTEROL SULFATE 2.5 MG: 2.5 SOLUTION RESPIRATORY (INHALATION) at 14:40

## 2017-01-01 RX ADMIN — IPRATROPIUM BROMIDE AND ALBUTEROL SULFATE 3 ML: .5; 3 SOLUTION RESPIRATORY (INHALATION) at 07:46

## 2017-01-01 RX ADMIN — IPRATROPIUM BROMIDE AND ALBUTEROL SULFATE 3 ML: .5; 3 SOLUTION RESPIRATORY (INHALATION) at 11:40

## 2017-01-01 RX ADMIN — BISACODYL 10 MG: 10 SUPPOSITORY RECTAL at 08:06

## 2017-01-01 RX ADMIN — TAZOBACTAM SODIUM AND PIPERACILLIN SODIUM 3.38 G: 375; 3 INJECTION, SOLUTION INTRAVENOUS at 17:42

## 2017-01-01 RX ADMIN — METOPROLOL TARTRATE 50 MG: 50 TABLET ORAL at 17:47

## 2017-01-01 RX ADMIN — POTASSIUM CHLORIDE 10 MEQ: 750 CAPSULE, EXTENDED RELEASE ORAL at 09:49

## 2017-01-01 RX ADMIN — FINASTERIDE 5 MG: 5 TABLET, FILM COATED ORAL at 08:26

## 2017-01-01 RX ADMIN — PANTOPRAZOLE SODIUM 40 MG: 40 TABLET, DELAYED RELEASE ORAL at 19:10

## 2017-01-01 RX ADMIN — MORPHINE SULFATE 4 MG: 4 INJECTION, SOLUTION INTRAMUSCULAR; INTRAVENOUS at 05:19

## 2017-01-01 RX ADMIN — DILTIAZEM HYDROCHLORIDE 300 MG: 300 CAPSULE, COATED, EXTENDED RELEASE ORAL at 09:13

## 2017-01-01 RX ADMIN — METOPROLOL TARTRATE 50 MG: 50 TABLET ORAL at 17:11

## 2017-01-01 RX ADMIN — TAMSULOSIN HYDROCHLORIDE 0.4 MG: 0.4 CAPSULE ORAL at 19:04

## 2017-01-01 RX ADMIN — IPRATROPIUM BROMIDE AND ALBUTEROL SULFATE 3 ML: .5; 3 SOLUTION RESPIRATORY (INHALATION) at 12:19

## 2017-01-01 RX ADMIN — Medication 1000 MG: at 05:48

## 2017-01-01 RX ADMIN — BISACODYL 10 MG: 10 SUPPOSITORY RECTAL at 10:08

## 2017-01-01 RX ADMIN — BISACODYL 10 MG: 10 SUPPOSITORY RECTAL at 10:09

## 2017-01-01 RX ADMIN — IPRATROPIUM BROMIDE AND ALBUTEROL SULFATE 3 ML: .5; 3 SOLUTION RESPIRATORY (INHALATION) at 07:55

## 2017-01-01 RX ADMIN — DILTIAZEM HYDROCHLORIDE 300 MG: 300 CAPSULE, COATED, EXTENDED RELEASE ORAL at 09:50

## 2017-01-01 RX ADMIN — PANTOPRAZOLE SODIUM 40 MG: 40 TABLET, DELAYED RELEASE ORAL at 09:44

## 2017-01-01 RX ADMIN — DIPHENHYDRAMINE HYDROCHLORIDE 25 MG: 50 INJECTION, SOLUTION INTRAMUSCULAR; INTRAVENOUS at 09:16

## 2017-01-01 RX ADMIN — APIXABAN 5 MG: 5 TABLET, FILM COATED ORAL at 09:13

## 2017-01-01 RX ADMIN — IPRATROPIUM BROMIDE AND ALBUTEROL SULFATE 3 ML: .5; 3 SOLUTION RESPIRATORY (INHALATION) at 07:29

## 2017-01-01 RX ADMIN — DILTIAZEM HYDROCHLORIDE 300 MG: 300 CAPSULE, COATED, EXTENDED RELEASE ORAL at 17:01

## 2017-01-01 RX ADMIN — FAMOTIDINE 20 MG: 10 INJECTION, SOLUTION INTRAVENOUS at 08:56

## 2017-01-01 RX ADMIN — IPRATROPIUM BROMIDE AND ALBUTEROL SULFATE 3 ML: .5; 3 SOLUTION RESPIRATORY (INHALATION) at 15:38

## 2017-01-01 RX ADMIN — APIXABAN 5 MG: 5 TABLET, FILM COATED ORAL at 17:42

## 2017-01-01 RX ADMIN — VANCOMYCIN HYDROCHLORIDE 1000 MG: 1 INJECTION, SOLUTION INTRAVENOUS at 08:44

## 2017-01-01 RX ADMIN — BISACODYL 10 MG: 10 SUPPOSITORY RECTAL at 17:04

## 2017-01-01 RX ADMIN — FINASTERIDE 5 MG: 5 TABLET, FILM COATED ORAL at 09:44

## 2017-01-01 RX ADMIN — MORPHINE SULFATE 4 MG: 4 INJECTION, SOLUTION INTRAMUSCULAR; INTRAVENOUS at 03:18

## 2017-01-01 RX ADMIN — IPRATROPIUM BROMIDE AND ALBUTEROL SULFATE 3 ML: .5; 3 SOLUTION RESPIRATORY (INHALATION) at 11:13

## 2017-01-01 RX ADMIN — IPRATROPIUM BROMIDE AND ALBUTEROL SULFATE 3 ML: .5; 3 SOLUTION RESPIRATORY (INHALATION) at 08:08

## 2017-01-01 RX ADMIN — PALONOSETRON HYDROCHLORIDE 0.25 MG: 0.25 INJECTION INTRAVENOUS at 10:15

## 2017-01-01 RX ADMIN — TAZOBACTAM SODIUM AND PIPERACILLIN SODIUM 3.38 G: 375; 3 INJECTION, SOLUTION INTRAVENOUS at 02:12

## 2017-01-01 RX ADMIN — DEXAMETHASONE SODIUM PHOSPHATE 12 MG: 10 INJECTION INTRAMUSCULAR; INTRAVENOUS at 08:56

## 2017-01-01 RX ADMIN — POLYETHYLENE GLYCOL 3350 17 G: 17 POWDER, FOR SOLUTION ORAL at 09:13

## 2017-01-01 RX ADMIN — POLYETHYLENE GLYCOL 3350 17 G: 17 POWDER, FOR SOLUTION ORAL at 21:23

## 2017-01-01 RX ADMIN — MORPHINE SULFATE 2 MG: 2 INJECTION, SOLUTION INTRAMUSCULAR; INTRAVENOUS at 10:06

## 2017-01-01 RX ADMIN — ACETAMINOPHEN 400 MCG: 400 TABLET ORAL at 08:08

## 2017-01-01 RX ADMIN — CARBOPLATIN 150 MG: 10 INJECTION, SOLUTION INTRAVENOUS at 10:39

## 2017-01-01 RX ADMIN — SODIUM CHLORIDE, POTASSIUM CHLORIDE, SODIUM LACTATE AND CALCIUM CHLORIDE 100 ML/HR: 600; 310; 30; 20 INJECTION, SOLUTION INTRAVENOUS at 06:57

## 2017-01-01 RX ADMIN — DEXAMETHASONE SODIUM PHOSPHATE 12 MG: 10 INJECTION, SOLUTION INTRAMUSCULAR; INTRAVENOUS at 09:17

## 2017-01-01 RX ADMIN — ACETAMINOPHEN 400 MCG: 400 TABLET ORAL at 10:08

## 2017-01-01 RX ADMIN — PANTOPRAZOLE SODIUM 40 MG: 40 TABLET, DELAYED RELEASE ORAL at 17:11

## 2017-01-01 RX ADMIN — IPRATROPIUM BROMIDE AND ALBUTEROL SULFATE 3 ML: .5; 3 SOLUTION RESPIRATORY (INHALATION) at 12:34

## 2017-01-01 RX ADMIN — HYDROCODONE BITARTRATE AND ACETAMINOPHEN 1 TABLET: 10; 325 TABLET ORAL at 22:10

## 2017-01-01 RX ADMIN — TAZOBACTAM SODIUM AND PIPERACILLIN SODIUM 3.38 G: 375; 3 INJECTION, SOLUTION INTRAVENOUS at 10:08

## 2017-01-01 RX ADMIN — IPRATROPIUM BROMIDE AND ALBUTEROL SULFATE 3 ML: .5; 3 SOLUTION RESPIRATORY (INHALATION) at 04:06

## 2017-01-01 RX ADMIN — HYDROCODONE BITARTRATE AND ACETAMINOPHEN 1 TABLET: 10; 325 TABLET ORAL at 18:07

## 2017-01-01 RX ADMIN — TAZOBACTAM SODIUM AND PIPERACILLIN SODIUM 3.38 G: 375; 3 INJECTION, SOLUTION INTRAVENOUS at 17:52

## 2017-01-01 RX ADMIN — SODIUM CHLORIDE 250 ML: 900 INJECTION, SOLUTION INTRAVENOUS at 08:55

## 2017-01-01 RX ADMIN — APIXABAN 5 MG: 5 TABLET, FILM COATED ORAL at 10:07

## 2017-01-01 RX ADMIN — GADOBENATE DIMEGLUMINE 20 ML: 529 INJECTION, SOLUTION INTRAVENOUS at 07:52

## 2017-01-01 RX ADMIN — DEXAMETHASONE SODIUM PHOSPHATE 12 MG: 10 INJECTION, SOLUTION INTRAMUSCULAR; INTRAVENOUS at 10:20

## 2017-01-01 RX ADMIN — LINEZOLID 600 MG: 600 INJECTION, SOLUTION INTRAVENOUS at 10:08

## 2017-01-01 RX ADMIN — TAZOBACTAM SODIUM AND PIPERACILLIN SODIUM 3.38 G: 375; 3 INJECTION, SOLUTION INTRAVENOUS at 10:03

## 2017-01-01 RX ADMIN — IPRATROPIUM BROMIDE AND ALBUTEROL SULFATE 3 ML: .5; 3 SOLUTION RESPIRATORY (INHALATION) at 15:37

## 2017-01-01 RX ADMIN — GADOBENATE DIMEGLUMINE 20 ML: 529 INJECTION, SOLUTION INTRAVENOUS at 12:33

## 2017-01-01 RX ADMIN — MORPHINE SULFATE 4 MG: 4 INJECTION, SOLUTION INTRAMUSCULAR; INTRAVENOUS at 14:36

## 2017-01-01 RX ADMIN — METOPROLOL TARTRATE 50 MG: 50 TABLET ORAL at 09:13

## 2017-01-01 RX ADMIN — APIXABAN 5 MG: 5 TABLET, FILM COATED ORAL at 08:26

## 2017-01-01 RX ADMIN — LINEZOLID 600 MG: 600 INJECTION, SOLUTION INTRAVENOUS at 21:52

## 2017-01-01 RX ADMIN — LINEZOLID 600 MG: 600 INJECTION, SOLUTION INTRAVENOUS at 09:47

## 2017-01-01 RX ADMIN — IPRATROPIUM BROMIDE AND ALBUTEROL SULFATE 3 ML: .5; 3 SOLUTION RESPIRATORY (INHALATION) at 23:34

## 2017-01-01 RX ADMIN — METOPROLOL TARTRATE 50 MG: 50 TABLET ORAL at 09:51

## 2017-01-01 RX ADMIN — ACETAMINOPHEN 400 MCG: 400 TABLET ORAL at 09:49

## 2017-01-01 RX ADMIN — IPRATROPIUM BROMIDE AND ALBUTEROL SULFATE 3 ML: .5; 3 SOLUTION RESPIRATORY (INHALATION) at 20:41

## 2017-01-01 RX ADMIN — TAZOBACTAM SODIUM AND PIPERACILLIN SODIUM 4.5 G: 500; 4 INJECTION, SOLUTION INTRAVENOUS at 08:08

## 2017-01-01 RX ADMIN — TAZOBACTAM SODIUM AND PIPERACILLIN SODIUM 3.38 G: 375; 3 INJECTION, SOLUTION INTRAVENOUS at 05:52

## 2017-01-01 RX ADMIN — APIXABAN 5 MG: 5 TABLET, FILM COATED ORAL at 09:43

## 2017-01-01 RX ADMIN — POLYETHYLENE GLYCOL 3350 17 G: 17 POWDER, FOR SOLUTION ORAL at 08:47

## 2017-01-01 RX ADMIN — LINEZOLID 600 MG: 600 INJECTION, SOLUTION INTRAVENOUS at 08:08

## 2017-01-01 RX ADMIN — FINASTERIDE 5 MG: 5 TABLET, FILM COATED ORAL at 08:08

## 2017-01-01 RX ADMIN — APIXABAN 5 MG: 5 TABLET, FILM COATED ORAL at 08:04

## 2017-01-01 RX ADMIN — CARBOPLATIN 150 MG: 10 INJECTION, SOLUTION INTRAVENOUS at 10:58

## 2017-01-01 RX ADMIN — METOPROLOL TARTRATE 50 MG: 50 TABLET ORAL at 10:42

## 2017-01-01 RX ADMIN — HYDROCODONE BITARTRATE AND ACETAMINOPHEN 1 TABLET: 10; 325 TABLET ORAL at 21:37

## 2017-01-01 RX ADMIN — TAZOBACTAM SODIUM AND PIPERACILLIN SODIUM 4.5 G: 500; 4 INJECTION, SOLUTION INTRAVENOUS at 15:02

## 2017-01-01 RX ADMIN — TAMSULOSIN HYDROCHLORIDE 0.4 MG: 0.4 CAPSULE ORAL at 10:07

## 2017-01-01 RX ADMIN — DILTIAZEM HYDROCHLORIDE 300 MG: 300 CAPSULE, COATED, EXTENDED RELEASE ORAL at 19:05

## 2017-01-01 RX ADMIN — LINEZOLID 600 MG: 600 INJECTION, SOLUTION INTRAVENOUS at 09:50

## 2017-01-01 RX ADMIN — HYDROCODONE BITARTRATE AND ACETAMINOPHEN 1 TABLET: 10; 325 TABLET ORAL at 00:57

## 2017-01-01 RX ADMIN — MORPHINE SULFATE 4 MG: 4 INJECTION, SOLUTION INTRAMUSCULAR; INTRAVENOUS at 21:15

## 2017-01-01 RX ADMIN — DEXAMETHASONE SODIUM PHOSPHATE 12 MG: 10 INJECTION INTRAMUSCULAR; INTRAVENOUS at 08:44

## 2017-01-01 RX ADMIN — METOPROLOL TARTRATE 50 MG: 50 TABLET ORAL at 10:07

## 2017-01-01 RX ADMIN — SODIUM CHLORIDE 240 MG: 9 INJECTION, SOLUTION INTRAVENOUS at 14:11

## 2017-01-01 RX ADMIN — METOPROLOL TARTRATE 50 MG: 50 TABLET ORAL at 19:10

## 2017-01-01 RX ADMIN — PANTOPRAZOLE SODIUM 40 MG: 40 TABLET, DELAYED RELEASE ORAL at 17:51

## 2017-01-01 RX ADMIN — SCOPOLAMINE 1 PATCH: 1 PATCH, EXTENDED RELEASE TRANSDERMAL at 11:40

## 2017-01-01 RX ADMIN — TAZOBACTAM SODIUM AND PIPERACILLIN SODIUM 3.38 G: 375; 3 INJECTION, SOLUTION INTRAVENOUS at 17:39

## 2017-01-01 RX ADMIN — APIXABAN 5 MG: 5 TABLET, FILM COATED ORAL at 08:42

## 2017-01-01 RX ADMIN — SODIUM CHLORIDE, POTASSIUM CHLORIDE, SODIUM LACTATE AND CALCIUM CHLORIDE 100 ML/HR: 600; 310; 30; 20 INJECTION, SOLUTION INTRAVENOUS at 03:28

## 2017-01-01 RX ADMIN — FAMOTIDINE 20 MG: 10 INJECTION, SOLUTION INTRAVENOUS at 08:23

## 2017-01-01 RX ADMIN — IPRATROPIUM BROMIDE AND ALBUTEROL SULFATE 3 ML: .5; 3 SOLUTION RESPIRATORY (INHALATION) at 07:20

## 2017-01-01 RX ADMIN — TAMSULOSIN HYDROCHLORIDE 0.4 MG: 0.4 CAPSULE ORAL at 08:08

## 2017-01-01 RX ADMIN — IPRATROPIUM BROMIDE AND ALBUTEROL SULFATE 3 ML: .5; 3 SOLUTION RESPIRATORY (INHALATION) at 05:26

## 2017-01-01 RX ADMIN — SODIUM CHLORIDE 250 ML: 900 INJECTION, SOLUTION INTRAVENOUS at 09:01

## 2017-01-01 RX ADMIN — LORAZEPAM 1 MG: 2 INJECTION INTRAMUSCULAR; INTRAVENOUS at 21:14

## 2017-01-01 RX ADMIN — DIPHENHYDRAMINE HYDROCHLORIDE 25 MG: 50 INJECTION, SOLUTION INTRAMUSCULAR; INTRAVENOUS at 10:21

## 2017-01-01 RX ADMIN — LORAZEPAM 1 MG: 2 INJECTION INTRAMUSCULAR; INTRAVENOUS at 11:40

## 2017-01-01 RX ADMIN — IPRATROPIUM BROMIDE AND ALBUTEROL SULFATE 3 ML: .5; 3 SOLUTION RESPIRATORY (INHALATION) at 03:30

## 2017-01-01 RX ADMIN — METOPROLOL TARTRATE 50 MG: 50 TABLET ORAL at 08:04

## 2017-01-01 RX ADMIN — TAZOBACTAM SODIUM AND PIPERACILLIN SODIUM 4.5 G: 500; 4 INJECTION, SOLUTION INTRAVENOUS at 01:59

## 2017-01-01 RX ADMIN — HYDROCODONE BITARTRATE AND ACETAMINOPHEN 1 TABLET: 10; 325 TABLET ORAL at 16:27

## 2017-01-01 RX ADMIN — MORPHINE SULFATE 2 MG: 2 INJECTION, SOLUTION INTRAMUSCULAR; INTRAVENOUS at 20:21

## 2017-01-01 RX ADMIN — TAMSULOSIN HYDROCHLORIDE 0.4 MG: 0.4 CAPSULE ORAL at 09:50

## 2017-01-01 RX ADMIN — METOPROLOL TARTRATE 50 MG: 50 TABLET ORAL at 09:49

## 2017-01-01 RX ADMIN — HYDROCODONE BITARTRATE AND ACETAMINOPHEN 1 TABLET: 10; 325 TABLET ORAL at 04:11

## 2017-01-01 RX ADMIN — APIXABAN 5 MG: 5 TABLET, FILM COATED ORAL at 17:11

## 2017-01-01 RX ADMIN — FUROSEMIDE 40 MG: 40 TABLET ORAL at 09:14

## 2017-01-01 RX ADMIN — DIPHENHYDRAMINE HYDROCHLORIDE 25 MG: 50 INJECTION, SOLUTION INTRAMUSCULAR; INTRAVENOUS at 08:26

## 2017-01-01 RX ADMIN — APIXABAN 5 MG: 5 TABLET, FILM COATED ORAL at 09:50

## 2017-01-01 RX ADMIN — DILTIAZEM HYDROCHLORIDE 300 MG: 300 CAPSULE, COATED, EXTENDED RELEASE ORAL at 08:43

## 2017-01-01 RX ADMIN — LORAZEPAM 1 MG: 2 INJECTION INTRAMUSCULAR; INTRAVENOUS at 05:19

## 2017-01-01 RX ADMIN — PANTOPRAZOLE SODIUM 40 MG: 40 TABLET, DELAYED RELEASE ORAL at 10:07

## 2017-01-01 RX ADMIN — BISACODYL 10 MG: 10 SUPPOSITORY RECTAL at 10:07

## 2017-01-01 RX ADMIN — ALBUTEROL SULFATE 2.5 MG: 2.5 SOLUTION RESPIRATORY (INHALATION) at 13:30

## 2017-01-01 RX ADMIN — IPRATROPIUM BROMIDE AND ALBUTEROL SULFATE 3 ML: .5; 3 SOLUTION RESPIRATORY (INHALATION) at 07:59

## 2017-01-01 RX ADMIN — IPRATROPIUM BROMIDE AND ALBUTEROL SULFATE 3 ML: .5; 3 SOLUTION RESPIRATORY (INHALATION) at 23:27

## 2017-01-01 RX ADMIN — IPRATROPIUM BROMIDE AND ALBUTEROL SULFATE 3 ML: .5; 3 SOLUTION RESPIRATORY (INHALATION) at 16:16

## 2017-01-01 RX ADMIN — APIXABAN 5 MG: 5 TABLET, FILM COATED ORAL at 19:10

## 2017-01-01 RX ADMIN — FINASTERIDE 5 MG: 5 TABLET, FILM COATED ORAL at 09:13

## 2017-01-01 RX ADMIN — IPRATROPIUM BROMIDE AND ALBUTEROL SULFATE 3 ML: .5; 3 SOLUTION RESPIRATORY (INHALATION) at 05:28

## 2017-01-01 RX ADMIN — TAMSULOSIN HYDROCHLORIDE 0.4 MG: 0.4 CAPSULE ORAL at 09:43

## 2017-01-01 RX ADMIN — SODIUM CHLORIDE 250 ML: 900 INJECTION, SOLUTION INTRAVENOUS at 09:15

## 2017-01-01 RX ADMIN — IPRATROPIUM BROMIDE AND ALBUTEROL SULFATE 3 ML: .5; 3 SOLUTION RESPIRATORY (INHALATION) at 20:15

## 2017-01-01 RX ADMIN — FINASTERIDE 5 MG: 5 TABLET, FILM COATED ORAL at 08:44

## 2017-01-01 RX ADMIN — METOPROLOL TARTRATE 50 MG: 50 TABLET ORAL at 09:14

## 2017-01-01 RX ADMIN — TAZOBACTAM SODIUM AND PIPERACILLIN SODIUM 4.5 G: 500; 4 INJECTION, SOLUTION INTRAVENOUS at 16:29

## 2017-01-01 RX ADMIN — HYDROCODONE BITARTRATE AND ACETAMINOPHEN 1 TABLET: 10; 325 TABLET ORAL at 05:22

## 2017-01-01 RX ADMIN — METOPROLOL TARTRATE 50 MG: 50 TABLET ORAL at 17:51

## 2017-01-01 RX ADMIN — TAMSULOSIN HYDROCHLORIDE 0.4 MG: 0.4 CAPSULE ORAL at 08:26

## 2017-01-01 RX ADMIN — ACETAMINOPHEN 400 MCG: 400 TABLET ORAL at 09:14

## 2017-01-01 RX ADMIN — DILTIAZEM HYDROCHLORIDE 300 MG: 300 CAPSULE, COATED, EXTENDED RELEASE ORAL at 08:02

## 2017-01-01 RX ADMIN — IPRATROPIUM BROMIDE AND ALBUTEROL SULFATE 3 ML: .5; 3 SOLUTION RESPIRATORY (INHALATION) at 11:09

## 2017-01-01 RX ADMIN — ALBUTEROL SULFATE 2.5 MG: 2.5 SOLUTION RESPIRATORY (INHALATION) at 14:04

## 2017-01-01 RX ADMIN — IPRATROPIUM BROMIDE AND ALBUTEROL SULFATE 3 ML: .5; 3 SOLUTION RESPIRATORY (INHALATION) at 15:49

## 2017-01-01 RX ADMIN — APIXABAN 5 MG: 5 TABLET, FILM COATED ORAL at 17:40

## 2017-01-01 RX ADMIN — TAZOBACTAM SODIUM AND PIPERACILLIN SODIUM 3.38 G: 375; 3 INJECTION, SOLUTION INTRAVENOUS at 17:11

## 2017-01-01 RX ADMIN — FAMOTIDINE 20 MG: 10 INJECTION, SOLUTION INTRAVENOUS at 10:18

## 2017-01-01 RX ADMIN — BISACODYL 10 MG: 10 SUPPOSITORY RECTAL at 09:45

## 2017-01-01 RX ADMIN — IPRATROPIUM BROMIDE AND ALBUTEROL SULFATE 3 ML: .5; 3 SOLUTION RESPIRATORY (INHALATION) at 15:45

## 2017-01-01 RX ADMIN — TAZOBACTAM SODIUM AND PIPERACILLIN SODIUM 3.38 G: 375; 3 INJECTION, SOLUTION INTRAVENOUS at 01:06

## 2017-01-01 RX ADMIN — SODIUM CHLORIDE 250 ML: 900 INJECTION, SOLUTION INTRAVENOUS at 14:06

## 2017-01-01 RX ADMIN — TOBRAMYCIN SULFATE 660 MG: 40 INJECTION, SOLUTION INTRAMUSCULAR; INTRAVENOUS at 19:05

## 2017-01-01 RX ADMIN — VANCOMYCIN HYDROCHLORIDE 2500 MG: 1 INJECTION, POWDER, LYOPHILIZED, FOR SOLUTION INTRAVENOUS at 08:12

## 2017-01-01 RX ADMIN — TAZOBACTAM SODIUM AND PIPERACILLIN SODIUM 3.38 G: 375; 3 INJECTION, SOLUTION INTRAVENOUS at 09:49

## 2017-01-01 RX ADMIN — PALONOSETRON HYDROCHLORIDE 0.25 MG: 0.25 INJECTION INTRAVENOUS at 09:58

## 2017-01-01 RX ADMIN — APIXABAN 5 MG: 5 TABLET, FILM COATED ORAL at 19:03

## 2017-01-01 RX ADMIN — MORPHINE SULFATE 4 MG: 4 INJECTION, SOLUTION INTRAMUSCULAR; INTRAVENOUS at 04:25

## 2017-01-01 RX ADMIN — MORPHINE SULFATE 4 MG: 4 INJECTION, SOLUTION INTRAMUSCULAR; INTRAVENOUS at 17:11

## 2017-01-01 RX ADMIN — FINASTERIDE 5 MG: 5 TABLET, FILM COATED ORAL at 09:14

## 2017-01-01 RX ADMIN — LINEZOLID 600 MG: 600 INJECTION, SOLUTION INTRAVENOUS at 20:07

## 2017-01-01 RX ADMIN — TAMSULOSIN HYDROCHLORIDE 0.4 MG: 0.4 CAPSULE ORAL at 09:49

## 2017-01-01 RX ADMIN — IPRATROPIUM BROMIDE AND ALBUTEROL SULFATE 3 ML: .5; 3 SOLUTION RESPIRATORY (INHALATION) at 16:15

## 2017-01-01 RX ADMIN — GADOBENATE DIMEGLUMINE 20 ML: 529 INJECTION, SOLUTION INTRAVENOUS at 15:15

## 2017-01-01 RX ADMIN — METOPROLOL TARTRATE 50 MG: 50 TABLET ORAL at 17:40

## 2017-01-01 RX ADMIN — TAZOBACTAM SODIUM AND PIPERACILLIN SODIUM 4.5 G: 500; 4 INJECTION, SOLUTION INTRAVENOUS at 00:49

## 2017-01-01 RX ADMIN — HYDROCODONE BITARTRATE AND HOMATROPINE METHYLBROMIDE 5 ML: 5; 1.5 SOLUTION ORAL at 20:38

## 2017-01-01 RX ADMIN — IPRATROPIUM BROMIDE AND ALBUTEROL SULFATE 3 ML: .5; 3 SOLUTION RESPIRATORY (INHALATION) at 19:38

## 2017-01-01 RX ADMIN — APIXABAN 5 MG: 5 TABLET, FILM COATED ORAL at 18:22

## 2017-01-01 RX ADMIN — LORAZEPAM 0.5 MG: 2 SOLUTION, CONCENTRATE ORAL at 06:43

## 2017-01-01 RX ADMIN — POLYETHYLENE GLYCOL 3350 17 G: 17 POWDER, FOR SOLUTION ORAL at 20:28

## 2017-01-01 RX ADMIN — LINEZOLID 600 MG: 600 INJECTION, SOLUTION INTRAVENOUS at 21:23

## 2017-01-01 RX ADMIN — METOPROLOL TARTRATE 50 MG: 50 TABLET ORAL at 17:07

## 2017-01-01 RX ADMIN — ACETAMINOPHEN 1000 MG: 500 TABLET ORAL at 05:48

## 2017-01-01 RX ADMIN — LINEZOLID 600 MG: 600 INJECTION, SOLUTION INTRAVENOUS at 22:47

## 2017-01-01 RX ADMIN — IPRATROPIUM BROMIDE AND ALBUTEROL SULFATE 3 ML: .5; 3 SOLUTION RESPIRATORY (INHALATION) at 08:21

## 2017-01-01 RX ADMIN — LORAZEPAM 1 MG: 2 INJECTION INTRAMUSCULAR; INTRAVENOUS at 17:11

## 2017-01-01 RX ADMIN — TAZOBACTAM SODIUM AND PIPERACILLIN SODIUM 3.38 G: 375; 3 INJECTION, SOLUTION INTRAVENOUS at 00:10

## 2017-01-01 RX ADMIN — DIPHENHYDRAMINE HYDROCHLORIDE 25 MG: 50 INJECTION, SOLUTION INTRAMUSCULAR; INTRAVENOUS at 09:59

## 2017-01-01 RX ADMIN — TAZOBACTAM SODIUM AND PIPERACILLIN SODIUM 3.38 G: 375; 3 INJECTION, SOLUTION INTRAVENOUS at 22:51

## 2017-01-01 RX ADMIN — IPRATROPIUM BROMIDE AND ALBUTEROL SULFATE 3 ML: .5; 3 SOLUTION RESPIRATORY (INHALATION) at 15:27

## 2017-01-01 RX ADMIN — TAZOBACTAM SODIUM AND PIPERACILLIN SODIUM 3.38 G: 375; 3 INJECTION, SOLUTION INTRAVENOUS at 19:10

## 2017-01-03 NOTE — MR AVS SNAPSHOT
Alexander S Mendelsberg   1/3/2017 10:45 AM   Office Visit    Dept Phone:  [phone]   Encounter #:  [id number]    Provider:  Katelynn Quan MD   Department:  [facility] RADIATION ONCOLOGY                Your Full Care Plan              Your Updated Medication List          This list is accurate as of: 1/3/17 11:30 AM.  Always use your most recent med list.                apixaban 5 MG tablet tablet   Commonly known as:  ELIQUIS   Take 1 tablet by mouth 2 (Two) Times a Day.       aspirin 81 MG tablet       Calcium-Vitamin D 600-200 MG-UNIT per tablet       CARTIA  MG 24 hr capsule   Generic drug:  diltiaZEM CD   TAKE 1 CAPSULE EVERY DAY       diltiaZEM 300 MG 24 hr capsule   Commonly known as:  TIAZAC       finasteride 5 MG tablet   Commonly known as:  PROSCAR   TAKE 1 TABLET DAILY       folic acid 400 MCG tablet   Commonly known as:  FOLVITE       furosemide 40 MG tablet   Commonly known as:  LASIX   TAKE 1 TABLET EVERY DAY       metoprolol tartrate 50 MG tablet   Commonly known as:  LOPRESSOR       MILK OF MAGNESIA PO       MIRALAX PO       MULTI COMPLETE capsule       potassium chloride 10 MEQ CR tablet   Commonly known as:  K-DUR,KLOR-CON   TAKE 1 TABLET EVERY DAY (SUBSTITUTED FOR  K-DUR)       predniSONE 10 MG tablet   Commonly known as:  DELTASONE   Take 2 tablets by mouth Daily. Start 2 days after each chemo, take it in the morning, and for 3 days only       Probiotic capsule       tamsulosin 0.4 MG capsule 24 hr capsule   Commonly known as:  FLOMAX   TAKE 1 CAPSULE EVERY DAY       vitamin B-12 1000 MCG tablet   Commonly known as:  CYANOCOBALAMIN       VITAMIN B-6 PO       vitamin C 500 MG tablet   Commonly known as:  ASCORBIC ACID       vitamin E 1000 UNIT capsule               Instructions     None    Patient Instructions History      MyChart Signup     [facility] that [facility]. If you would like to sign up  for [facility], please email [email or web address] or call [phone] to obtain an activation code.             Other Info from Your Visit           Your appointments     Date & Time Provider Appointment Department    Jan 13, 2017  8:20 AM EST LAB CHAIR 3 CBC KRESGE Lab [facility] KREE ONCOLOGY CBC LAB    Jan 13, 2017  9:00 AM EST CHAIR 07 CBC JOSELUIS - MD INFUSION [facility] INFUSION CBC    Jan 18, 2017 10:30 AM EST MIROS[facility]VA CT 2 CT miroslava abd pelvis wo contrast [facility] CT    Patient may only have liquids 4 hrs prior to exam. Please arrive 1 hour prior to exam to drink barium contrast.    Jan 27, 2017  7:30 AM EST LAB CHAIR 4 [facility]    Jan 27, 2017  8:00 AM EST Vahid Garcia MD FOLLOW UP [facility] CBC GROUP: CONSULTANTS IN BLOOD DISORDERS AND CANCER    Jan 27, 2017  8:45 AM EST CHAIR 03 CBC KRE - LG INFUSION [facility] INFUSION CBC        [facility] ONCOLOGY CBC LAB  [place]  [address] [id number]  [phone] [facility] CT  [place]  [address] [facility] [address]  [phone] [facility] INFUSION CBC  [place]  [address] [address]  [phone]        [facility] CBC GROUP: CONSULTANTS IN BLOOD DISORDERS AND CANCER  CBC [address] [address]  [phone]            Vital Signs     Blood Pressure Pulse Temperature Respirations Oxygen Saturation Smoking Status    151/80 103 96.8 °F (36 °C) (Oral) 16 91% Former Smoker      Problems and Diagnoses Noted     Brain metastasis    Malignant neoplasm of overlapping sites of left lung

## 2017-01-03 NOTE — PROGRESS NOTES
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; post stereotactic radiation therapy    Stage IV (T2, NX, M1b)     Patient Care Team:  Sergio Green MD as PCP - General (Family Medicine)  Sergio Green MD as Referring Physician (Family Medicine)  Som Garrett MD as Consulting Physician (Cardiology)  Vahid Garcia MD as Consulting Physician (Hematology and Oncology)  Joo Christensen Jr., MD as Consulting Physician (Urology)  Katelynn Quan MD as Consulting Physician (Radiation Oncology)    CHIEF COMPLAINT:  Post-radiation follow up  I had the pleasure of seeing Alexander S Mendelsberg  back in the department today, now approximately 3 weeks out from the radiation therapy portion of his treatment for the above mentioned diagnosis. He received a single cranial treatment on December 8, 2016.     Clinically he is doing wonderfully well.  He has no new complaints. He denies any neurologic symptoms, states his breathing is stable and is tolerating his systemic treatment well.  He specifically denies any pain in the cervical spine region, headache, nausea or other neuro signs.    Past Medical History: he  has a past medical history of Aftercare following surgery; Anemia; Atrial fibrillation; Benign prostatic hypertrophy; CAD (coronary artery disease); Chronic kidney disease; Fatigue; H/O thrombocytopenia; H/O transfusion of packed red blood cells; H/O: pneumonia (2013); History of gross hematuria; Hyperlipidemia; Hypertension; Pulmonary embolism; Pulmonary hypertension; Renal insufficiency syndrome; Transitional cell carcinoma of kidney (2013); and Urothelial carcinoma.   No history exists.       Past Surgical History:  he has a past surgical history that includes Bladder surgery; Kidney surgery; Nephrectomy (Right, 01/2013); Cholecystectomy; and Cataract Extraction.    Meds:    Current Outpatient Prescriptions:   •  apixaban (ELIQUIS) 5  MG tablet tablet, Take 1 tablet by mouth 2 (Two) Times a Day., Disp: 60 tablet, Rfl: 5  •  aspirin 81 MG tablet, Take 81 mg by mouth Daily., Disp: , Rfl:   •  Calcium-Vitamin D 600-200 MG-UNIT per tablet, Take 1 tablet by mouth 2 (Two) Times a Day., Disp: , Rfl:   •  CARTIA  MG 24 hr capsule, TAKE 1 CAPSULE EVERY DAY, Disp: 90 capsule, Rfl: 1  •  diltiazem (TIAZAC) 300 MG 24 hr capsule, Take 300 mg by mouth Daily., Disp: , Rfl:   •  finasteride (PROSCAR) 5 MG tablet, TAKE 1 TABLET DAILY, Disp: 90 tablet, Rfl: 1  •  folic acid (FOLVITE) 400 MCG tablet, Take 400 mcg by mouth Daily., Disp: , Rfl:   •  furosemide (LASIX) 40 MG tablet, TAKE 1 TABLET EVERY DAY, Disp: 90 tablet, Rfl: 1  •  Magnesium Hydroxide (MILK OF MAGNESIA PO), Take  by mouth As Needed., Disp: , Rfl:   •  metoprolol tartrate (LOPRESSOR) 50 MG tablet, Take 50 mg by mouth 2 (two) times a day., Disp: , Rfl:   •  Multiple Vitamins-Minerals (MULTI COMPLETE) capsule, Take 1 tablet by mouth Daily., Disp: , Rfl:   •  Polyethylene Glycol 3350 (MIRALAX PO), Take 17 Units by mouth Every Night., Disp: , Rfl:   •  potassium chloride (K-DUR,KLOR-CON) 10 MEQ CR tablet, TAKE 1 TABLET EVERY DAY (SUBSTITUTED FOR  K-DUR), Disp: 90 tablet, Rfl: 1  •  predniSONE (DELTASONE) 10 MG tablet, Take 2 tablets by mouth Daily. Start 2 days after each chemo, take it in the morning, and for 3 days only, Disp: 20 tablet, Rfl: 1  •  Probiotic capsule, Take 1 capsule by mouth 2 (Two) Times a Day., Disp: , Rfl:   •  Pyridoxine HCl (VITAMIN B-6 PO), Take 100 mg by mouth Daily., Disp: , Rfl:   •  tamsulosin (FLOMAX) 0.4 MG capsule 24 hr capsule, TAKE 1 CAPSULE EVERY DAY, Disp: 90 capsule, Rfl: 0  •  vitamin B-12 (CYANOCOBALAMIN) 1000 MCG tablet, Take 1,000 mcg by mouth Daily., Disp: , Rfl:   •  vitamin C (ASCORBIC ACID) 500 MG tablet, Take 500 mg by mouth Every Night., Disp: , Rfl:   •  vitamin E 1000 UNIT capsule, Take 400 Units by mouth Daily., Disp: , Rfl:     Allergies:  No Known  Allergies    Family History:  his family history includes Cancer in his father; Heart disease in his brother.    Social History:  he  reports that he has quit smoking. His smoking use included Cigarettes. He has a 15.00 pack-year smoking history. He has quit using smokeless tobacco. He reports that he drinks alcohol. He reports that he does not use illicit drugs.    Pertinent Findings on Review of Systems:  Fourteen systems have been reviewed with the patient and are negative other than as mentioned above.    Performance Status:  (2) Ambulatory and capable of self care, unable to carry out work activity, up and about > 50% or waking hours    Pertinent Findings on Physical Exam:  Vitals:    01/03/17 1041   BP: 151/80   Pulse: 103   Resp: 16   Temp: 96.8 °F (36 °C)   TempSrc: Oral   SpO2: 91%   PainSc: 0-No pain       Physical Exam  No pain in neck on exam. CN intact, no gait abnormality.    Assessment:  Malignant neoplasm of overlapping sites of left lung    With brain mets - post stereotactic treatment; continued observation of metastatic lesion at C3    Plan:   Note is made of continuing systemic treatment with Dr. Garcia. He has scheduled CT scans of the chest, abdomen and pelvis for restaging for January 18, 2017. I will plan to order an MRI of the brain and C-spine in the same time frame to assess his response and evaluate the need to treat the C3 lesion.     I did encourage him to let us know if he develops any pain in the region and demonstrated the area of concern on him. Also asked him to watch for any neuro symptoms and he was agreeable to the above time frame. I will plan on discussing the results with Dr. Garcia and getting the patient back as needed thereafter.

## 2017-01-13 NOTE — PROGRESS NOTES
Pt c/o occasional, productive cough with white colored sputum. States this is not new for him. Denies any difficulty breathing or fevers. Lungs clear and o2 sat 97% on 3 Liters o2. Pt states within the past week he has developed pain to his left side, below his left scapula when he coughs or lays on his side. Pt states pain will sometimes occur after activity but subsides if he lies on his back. No knots or areas of redness noted. Pt rates pain 6/10 at its worst, denies any pain currently. States he has not taken anything for pain but will try tylenol. Pt does not want additional pain medicine at this time. Was encouraged to call if pain persists and tylenol does not help pain. Pt and wife v/u. Reviewed pt's complaints with Dr. Garcia. No new orders received. Pt is scheduled for scans next week. Pt will call if pain worsens or if new symptoms develop (worsening cough, SOA, fever, or other signs of infection)     Creat result noted to be 1.81. Reviewed with Dr. Garcia. No new orders. Pt encouraged to increase fluid intake at home. Pt and wife v/u.

## 2017-01-27 NOTE — PROGRESS NOTES
CMP reviewed with Dr Garcia.  Orders signed by MD - phong for treatment.  Verified premeds for chemo treatment - per MD pt is to receive Pepcid, Aloxi, Camilo, and Dex.      Reviewed Carboplatin with pt and wife.  Wife already had printed off educational material of drug that she received from MD.  Reviewed information and side effects with pt and wife.  Both verbalized understanding.  Consent obtained and signed for Carboplatin and consent placed in basket to be scanned.

## 2017-01-27 NOTE — PROGRESS NOTES
Subjective     REASON FOR FOLLOW UP: HISTORY OF R KIDNEY CANCER S/P NEPHRECTOMY, HISTORY OF BLADDER CANCER, NOW WITH LUNG NODULES , PATHOLOGY OF LEFT LUNG MASS SHOWS TYPICAL ADENOCARCINOMA OF THE LUNG  EGFR ALK1 PDL-1 negative, CERVICAL SPINE AND BRAIN METASTASIS, BRAIN EDEMA, CLOTH LEFT ATRIUM, ANTICOAGULATION.                                 History of Present Illness   This patient is here today for review in company of his wife after he has undergone stereotactic radiation therapy for a solitary brain metastasis by Dr. Katelynn Quan last week. The patient did extremely well from this point of treatment without developing difficulties and he has no symptoms that would suggest intracranial hypertension or brain edema.  The patient's appetite has remained very good.  His bowel activity and urination are normal. He has no new issues in regard to cough or shortness of breath and no hemoptysis.  In regard to his anticoagulation, due to his chronic atrial fibrillation and left ventricular clot, the patient has not had any embolic phenomenon and neither clinical bleeding.  His INR is are acceptable and he has an ECOG performance status of 1-2. He remains on oxygen.  He remains on oxygen.  His cervical spine metastases was not treated because the fear of treating something asymptomatic producing esophagitis and laryngitis that could compromise the patient's quality of life in a negative way.  Obviously under these circumstances the patient will repeat an MRI of the brain, more or less in 5 weeks, along with an MRI of the cervical spine. We discussed with him today the findings of his tumor cells being EGFR negative, ALK-1 negative and PDL negative.  In other words, he has triple negative disease. Given this fact the patient has been advised to consider palliative Taxol every other week to initiate this this week with treatment plan for 2 months and reassess radiologically in regard to his lung disease.         On  01/27/2017 the patient has started to complain of pain in the rib cage posteriorly on the left side close to the tip of the left scapula. There is an area of bulging in the rib cage that is suspected to be a metastatic deposit. Palpation on the clinical exam as related below triggers discomfort. Given this fact we advised the patient to consider radiation treatment. Also we discussed with him the findings of the CT scan as below showing progressive disease. Taxol will be discontinued. Given the fact that his disease is triple negative, Opdivo ruled out as an option. Given the fact that his kidney function is so marginal, the patient will not be candidate to receive platinum with Alimta. Given the fact that he does not have a port, Navelbine is not a choice either. The only option will be giving him single agent carboplatinum AUC of 2, 3 weeks out of 4 for 2 cycles and re-evaluate him at that point. Again, the option of chemotherapy is palliative not curative and the chance of response is around 20%. This was discussed with the patient and wife and they are agreeing to proceed.    Besides this the patient has a good appetite, normal bowel function, normal urination with frequency and minimal occasional hematuria. He has no cough or sputum production. His shortness of breath and need for oxygen remains the same. He has not had any clinical bleeding besides the minimal hematuria while taking Eliquis. We also went ahead and discontinued his aspirin today.                       Past Medical History   Diagnosis Date   • Aftercare following surgery    • Anemia      h/o multifactorial anemia   • Atrial fibrillation    • Benign prostatic hypertrophy    • CAD (coronary artery disease)    • Chronic kidney disease      STAGE III   • Fatigue    • H/O thrombocytopenia    • H/O transfusion of packed red blood cells    • H/O: pneumonia 2013     right lower lobe   • History of gross hematuria    • Hyperlipidemia    • Hypertension     • Pulmonary embolism    • Pulmonary hypertension    • Renal insufficiency syndrome    • Transitional cell carcinoma of kidney 2013     status post nephrectomy   • Urothelial carcinoma      Of the bladder - Underwent transurethral reection w/possible residual tumor in the ureter  with concurrent chemoradiation         Past Surgical History   Procedure Laterality Date   • Bladder surgery     • Kidney surgery     • Nephrectomy Right 01/2013   • Cholecystectomy       35 years ago   • Cataract extraction          Current Outpatient Prescriptions on File Prior to Visit   Medication Sig Dispense Refill   • apixaban (ELIQUIS) 5 MG tablet tablet Take 1 tablet by mouth 2 (Two) Times a Day. 60 tablet 5   • aspirin 81 MG tablet Take 81 mg by mouth Daily.     • Calcium-Vitamin D 600-200 MG-UNIT per tablet Take 1 tablet by mouth 2 (Two) Times a Day.     • CARTIA  MG 24 hr capsule TAKE 1 CAPSULE EVERY DAY 90 capsule 1   • diltiazem (TIAZAC) 300 MG 24 hr capsule Take 300 mg by mouth Daily.     • finasteride (PROSCAR) 5 MG tablet TAKE 1 TABLET DAILY 90 tablet 1   • folic acid (FOLVITE) 400 MCG tablet Take 400 mcg by mouth Daily.     • furosemide (LASIX) 40 MG tablet TAKE 1 TABLET EVERY DAY 90 tablet 1   • Magnesium Hydroxide (MILK OF MAGNESIA PO) Take  by mouth As Needed.     • metoprolol tartrate (LOPRESSOR) 50 MG tablet Take 50 mg by mouth 2 (two) times a day.     • Multiple Vitamins-Minerals (MULTI COMPLETE) capsule Take 1 tablet by mouth Daily.     • Polyethylene Glycol 3350 (MIRALAX PO) Take 17 Units by mouth Every Night.     • potassium chloride (K-DUR,KLOR-CON) 10 MEQ CR tablet TAKE 1 TABLET EVERY DAY (SUBSTITUTED FOR  K-DUR) 90 tablet 1   • predniSONE (DELTASONE) 10 MG tablet Take 2 tablets by mouth Daily. Start 2 days after each chemo, take it in the morning, and for 3 days only 20 tablet 1   • Probiotic capsule Take 1 capsule by mouth 2 (Two) Times a Day.     • Pyridoxine HCl (VITAMIN B-6 PO) Take 100 mg by mouth  Daily.     • tamsulosin (FLOMAX) 0.4 MG capsule 24 hr capsule TAKE 1 CAPSULE EVERY DAY 90 capsule 0   • vitamin B-12 (CYANOCOBALAMIN) 1000 MCG tablet Take 1,000 mcg by mouth Daily.     • vitamin C (ASCORBIC ACID) 500 MG tablet Take 500 mg by mouth Every Night.     • vitamin E 1000 UNIT capsule Take 400 Units by mouth Daily.       No current facility-administered medications on file prior to visit.         ALLERGIES:  No Known Allergies     Social History     Social History   • Marital status:      Spouse name: N/A   • Number of children: N/A   • Years of education: High school     Occupational History   •  [employer]     Social History Main Topics   • Smoking status: Former Smoker     Packs/day: 1.00     Years: 15.00     Types: Cigarettes   • Smokeless tobacco: Former User   • Alcohol use Yes      Comment: Occasional   • Drug use: No   • Sexual activity: Not Asked     Other Topics Concern   • None     Social History Narrative        Family History   Problem Relation Age of Onset   • Cancer Father    • Heart disease Brother     ONCOLOGIC/HEMATOLOGIC HISTORY     HEMATOLOGIC/ONCOLOGIC HISTORY: At the time of his initial presentation to our practice, Mr. Mendelsberg is a 77-year-old gentleman who was initially seen in consultation as an inpatient at [facility] in June 2013. He was admitted at that time for hematuria after TURBT and we will consulted by his primary care provider for anemia. He did receive IV Venofer was started on oral ferrous sulfate. He was on Warfarin, which had been stopped. Overall it was felt that his hematuria was secondary to bladder tumors as well as the fact that he was on anticoagulants and antiplatelet agents. During that hospitalization he had some cardiopulmonary issues as well requiring the attention of Cardiology and Pulmonary Medicine.     He was then treated with Zyvox for a urinary tract infection and received intravesical BCG x3 that was  stopped due to recurrent hematuria. Hematuria became more severe and ultimately Dr. Christensen decided to repeat cystoscopy. This was performed on 10/08/2013 with pathology report reviewed under Accession # [id number].  The operative note states that there is a tumor extending up into the right ureter. There were two other tumors that were resected as well and all were resected as deeply as possible. The pathology report shows two tumors, one from the left bladder floor and one from the right bladder floor, both showing papillary urothelial carcinoma focally high grade.  Both showed that the detrusor muscle was present, but there was no invasion into the muscle noted on either specimen. The tumor on the left bladder floor showed no definite invasion of the lamina propria, but the tumor on the right bladder floor did show some invasion into the lamina propria. Again, neither by pathology showed invasion into the detrusor muscle. Prior to discharge the patient did receive intravesical BCG. He overall did well and was discharged from the hospital.     His hematuria improved after that. He did receive another intravesical chemotherapy agent on Friday 10/25/1023 according to the patient, but he is not sure which agent this was.     While he was hospitalized he was seen by Radiation Oncology to consider radiation to the bladder. We have been asked to consider concurrent chemotherapy as well, considering the fact that he has recurrent bladder tumors that are recurring quickly and are high grade tumors.     He had a PET scan performed on 11/18/2013. This shows some small mildly metabolically active lymph nodes within the mediastinum. There is a large granuloma in the left lower lobe. The findings are consistent with granulomatous disease. There is a 1.5 cm indeterminate nodule in the left lower lobe with low level PET activity. This will be followed. There are some small lymph nodes within the retroperitoneum, none of which  demonstrate significant metabolic activity.     Chemotherapy with weekly carboplatin and Taxol to be administered along with radiation therapy was initiated on 12/03/2013.     Of note, he has a Pseudomonas urinary tract infection that is asymptomatic and on 12/04/2013 he is to start seven days of IV antibiotics for this per Urology.     As of 01/02/2014, he received week 5 of therapy with carboplatin and paclitaxel. He has mild thrombocytopenia with platelets of 89,000. He is receiving intravesicular gentamicin with plan for three doses of this. He is reasonably asymptomatic but is having some mild dysuria and urinary frequency which could be radiation cystitis.     He completed 6 cycles of weekly carboplatin/paclitaxel on 01/09/2014 and as of 01/16/2014 should finish radiation therapy on 01/20/2014.  He is having symptoms consistent with radiation cystitis and proctitis.      He had a surveillance CT scan performed on 04/11/2014 that showed no new findings.  Vascular calcifications noted.  Bilateral L5 spondylolysis noted.      A CT of the abdomen and pelvis without contrast on 08/10/2014 shows some thickening of the bladder walls as well as some mild dilatation of the left distal ureter. These results will be communicated to Dr. Christensen as the patient has not seen him recently. Otherwise he will followup in 4 months. His hemoglobin is stable as is his renal insufficiency.       Review of Systems   General: no fever, chills, SOME fatigue,NO  weight changes, or lack of appetite.STATED PAIN 3/10 RIB CAGE ON LEFT CLOSE TO SCAPULAR TIP  Eyes: no epiphora, xerophthalmia,conjunctivitis, pain, glaucoma, blurred vision, blindness, secretion, photophobia, proptosis, diplopia.  Ears: no otorrhea, tinnitus, otorrhagia, deafness, pain, vertigo.  Nose: no rhinorrhea, epistaxis, alteration in perception of odors, sinuses pressure.  Mouth: no alteration in gums or teeth,  ulcers, no difficulty with mastication or deglut ion,  "no odynophagia.  Neck: no masses or pain, no thyroid alterations, no pain in muscles or arteries, no carotid odynia, no crepitation.  Respiratory: CHRONIC cough,CLEAR sputum production,AND dyspnea,NO trepopnea, pleuritic pain, hemoptysis.  Heart: no syncope, irregularity, palpitations, angina,OCCASIONAL orthopnea, NO paroxysmal nocturnal dyspnea.  Vascular Venous: no tenderness,CHRONIC LEedema,NO  palpable cords, postphlebitic syndrome, skin changes or ulcerations.  Vascular Arterial: no distal ischemia, claudication, gangrene, neuropathic ischemic pain, skin ulcers, paleness or cyanosis.  GI: no dysphagia, odynophagia, no regurgitation, no heartburn,no indigestion,no nausea,no vomiting,no hematemesis ,no melena,no jaundice,no distention, no obstipation,no enterorrhagia,no proctalgia,no anal  lesions, nochanges in bowel habits.  : STATED frequency, hesitancy, NO hematuria, discharge, pain.  Musculoskeletal: stated muscle or tendon pain or inflammation, LUMBAR SPINE joint pain, NO edema,SOME functional limitation,NO fasciculations, mass.  Neurologic: no headache, seizures, alterations on Craneal nerves, no motor or senssory deficit, normal coordination, no alteration in memory, orientation, calculation,writting, verbal or written language.  Skin: no rashes, pruritus or localized lesions.  Psychiatric: no anxiety, depression, agitation, delusions, proper insight.    Objective     Vitals:    01/27/17 0752   Height: 74\" (188 cm)   PainSc: 0-No pain     Current Status 1/27/2017   ECOG score 0       Physical Exam    GENERAL:  Well-developed, well-nourished  Patient  in no acute distress.   SKIN:  Warm, dry without rashes, purpura or petechiae.  HEENT:  Pupils were equal and reactive to light and accomodation, conjunctivas non injected, no pterigion, normal extraocular movements, normal visual acuity.   Mouth mucosa was moist, no exudates in oropharynx, normal gum line, normal roof of the mouth and pillars, normal " papillations of the tongue.Ear canals were normal, as well tympanic membranes, normal hearing acuity.No pain in mastoid area or erythema.  NECK:  Supple with good range of motion; no thyromegaly or masses, no JVD or bruits, no cervical adenopathies.No carotid arteries pain, no carotid abnormal pulsation or arterial dance.  LYMPHATICS:  No cervical, supraclavicular, axillary, epitrochlear or inguinal adenopathy.  CHEST:  Normal excursion of both chandler thoraces, normal voice fremitus, no subcutaneous emphysema, normal axillas, no rashes or acanthosis nigricans. Lungs clear to percussion and auscultation, DECREASED breath sounds bilaterally, no wheezing,R BASE crackles AND ronchi, no stridor, no rubs.  CARDIAC AND VASCULAR: PMI not displaced,no thrills, normal rate and regular rhythm, without murmurs, rubs or S3 or S4 right or left sided gallops. Normal femoral, popliteal, pedis, brachial and carotid pulses.TENDER AREA RIB CAGE ON LEFT WITH BULGING IN RIBS  ABDOMEN:  Soft, nontender with no organomegaly or masses, no ascites, no collateral circulation,no distention,no Jorge Alberto sign, no abdominal pain, no inguinal hernias,no umbilical hernias, no abdominal bruits. Normal bowel sounds.LARGE R FLANK HERNIA AT NEPHRECTOMY SITE  GENITAL: Not  Performed.  EXTREMITIES  AND SPINE:  No clubbing, cyanosis 2+ LE edema, no deformities or pain .No kyphosis, scoliosis, deformities or pain in spine, ribs or pelvic bone.  NEUROLOGICAL:  Patient was awake, alert, oriented to time, person and place,normal gait and coordination, negative Romberg; cranial nerves were normal, motor strength in upper and lower extremities was 5/5 proximally and distally.      RECENT LABS:  Hematology WBC   Date Value Ref Range Status   01/27/2017 6.90 4.00 - 10.00 10*3/mm3 Final   03/10/2015 6.49 4.50 - 10.70 K/Cumm Final   03/10/2015 See below: (A) None Seen /hpf Final     Comment:     Too numerous to count     RBC   Date Value Ref Range Status   01/27/2017  4.12 (L) 4.30 - 5.50 10*6/mm3 Final   03/31/2016 4.69  Final   03/10/2015 4.67 4.60 - 6.00 Million Final     HEMOGLOBIN   Date Value Ref Range Status   01/27/2017 12.2 (L) 13.5 - 16.5 g/dL Final   03/31/2016 13.8 g/dL Final   03/10/2015 13.7 13.7 - 17.6 g/dL Final     HEMATOCRIT   Date Value Ref Range Status   01/27/2017 37.3 (L) 40.0 - 49.0 % Final   03/31/2016 43.4 % Final   03/10/2015 43.7 40.4 - 52.2 % Final     PLATELETS   Date Value Ref Range Status   01/27/2017 175 150 - 375 10*3/mm3 Final   03/10/2015 165 140 - 500 K/Cumm Final      CHEST, ABDOMEN AND PELVIS WITHOUT CONTRAST      HISTORY: 81-year-old male with lung cancer and brain metastatic disease.  Follow up exam.      TECHNIQUE CT chest, abdomen and pelvis without IV or oral contrast.       COMPARISON: PET CT 11/15/2016, CT chest 11/09/2016, CT of the abdomen,  pelvis 10/11/2016      FINDINGS  CHEST: Anterior inferior right upper lobe mass measures 2.5 x 2.1 cm and  this is increased in size from 2.1 x 1.7 cm on previous CT 11/09/2016. A  posterior-inferior right upper lobe nodule measures 1.1 cm and this is  increased from 1 cm on the prior exam.. Left lower lobe mass measures  3.2 x 2. 4 cm which is increased in size from 2.9 x 2.4 cm. There is  improvement in linear consolidation and opacity extending above this  mass in the left lower lobe. A central right middle lobe mass measures  2.5 x 2.3 cm which is increased in size from 2.2 x 2.1 cm. Several  additional smaller pulmonary nodules are present. Several subcentimeter  mediastinal lymph nodes are without change. Large esophageal  diverticulum without change. There are several osteosclerotic lesions  consistent with metastases within the sternum and thoracic spine. The  largest lesion is present within T11 and there is generalized sclerosis  within the T11 vertebral body without definite change.      ABDOMEN/PELVIS: Within the anterior right lobe of the liver near its  junction with the left lobe  there is a 1.6 cm low-density lesion  suspected to represent a hepatic metastasis. There is also a new 1.9 cm  low-density lesion within the superior aspect of the lateral segment  left lobe of the liver suspected to represent metastatic lesion.  Spleen, adrenal glands, pancreas appear within normal limits. There has  been previous right nephrectomy. There is renal cortical thinning and  scarring on the left without change. No yartiza enlargement is  demonstrated within the abdomen or pelvis. An IVC filter is present.  There is urinary bladder wall thickening and the urinary bladder is  partially decompressed. Prostate gland is enlarged. There are  osteosclerotic lesions consistent with metastases without definite  change. Patchy foci of osteosclerosis are present within the right  femoral head. There is no fracture.      IMPRESSION:  1. Progression of pulmonary metastatic disease compared to exam  11/09/2016. Increase in size of multiple bilateral pulmonary nodules.  Stable mediastinal lymph nodes.  2. Interval development of 2 hepatic low density lesions consistent with  metastases.  3. Multifocal osteosclerotic metastatic disease.  4. Stable findings include IVC filter, previous cholecystectomy, urinary  bladder wall thickening, prostate gland enlargement, previous right  nephrectomy, large esophageal diverticulum.      This report was finalized on 1/19/2017 10:58 AM by Dr. Jose iRley MD.  MRI EXAMINATION OF THE BRAIN WITH AND WITHOUT CONTRAST AND MRI  EXAMINATION OF THE CERVICAL SPINE WITH AND WITHOUT CONTRAST      HISTORY: Metastatic disease, follow-up.      COMPARISON: Comparison is made to the MRI examination of the brain from  12/01/2016 and MRI examination of the brain 11/15/2016. No prior MRI  examination of the cervical spine is available for comparison.       MRI CERVICAL SPINE WITH AND WITHOUT CONTRAST:      FINDINGS:   The study is hampered somewhat by patient motion.      Signal intensity within  the cervical cord is normal on the sagittal T2  sequence. There is moderate loss of disc height and disc desiccation at  C5-6 and C6-7 consistent with degenerative disease.      There is increased signal intensity throughout the C3 vertebral body on  the sagittal T2 sequence which is decreased in signal intensity on the  T1 sequence. This enhances after contrast administration consistent with  a large metastatic deposit. There is increased signal intensity  identified involving the cord, small at the level of C6 and C7, not  confirmed on the axial postcontrast imaging and likely related to  artifact.      C2-3: There is a small central disc osteophyte complex with no evidence  of herniation. The metastatic lesion involving C3 is appreciated. No  convincing epidural enhancement to suggest tumor is identified.      C3-4: There is a mild central disc osteophyte complex with no evidence  of herniation.      C4-5: There is no evidence of disc bulge or herniation.      C5-6: There is a mild broad-based disc osteophyte complex, more  prominent to the right.      C6-7: There is minimal central disc osteophyte complex with no evidence  of herniation.      C7-T1: There is no evidence of disc bulge or herniation.      IMPRESSION:  Metastatic lesion involving C3. No convincing metastatic lesions are  identified. Small foci of increased signal intensity are noted related  to the cord on the sagittal T1 postcontrast sequence but not confirmed  on the axial postcontrast sequence likely artifactual in nature.  Degenerative disease involving cervical spine is noted as described  above with no evidence of focal herniation or cord compression.      MRI EXAMINATION THE BRAIN WITH AND WITHOUT CONTRAST:      FINDINGS:   There is no evidence of restricted diffusion to suggest acute  infarction. The brain and ventricles are symmetrical. There is expected  flow-void in the basilar artery and in the distal aspect of the internal  carotid  arteries bilaterally on the axial T2 sequence. An area of  encephalomalacia involving the inferior lateral aspect the right  temporal lobe is noted, unchanged. There is a ring-enhancing lesion  involving the cortex of the right occipital lobe posterolaterally  measuring 6-7 mm in diameter similar in size as compared to the MRI  examination 12/01/2016. Axial T2 FLAIR sequence demonstrates adjacent  increased signal intensity suggesting vasogenic edema, improved however  as compared to 12/01/2016. The increased signal intensity previously  extended to the right lateral ventricle which is no longer the case. No  new enhancing lesion is identified.      Note is made of a 5-6 mm nearly spherical area of enhancement involving  the superior medial aspect of the left orbit, unchanged. This  potentially may represent venous ectasia. A metastatic deposit cannot be  entirely excluded. Should a prior CT or MRI examination of the brain  prior to 11/15/2016 be made available for comparison, I would be more  than happy to compare the studies and to generate a supplemental report.  Otherwise, further evaluation with a CT examination of the orbits with  contrast is suggested.      IMPRESSION:   1. Stable appearance of a 6 mm enhancing lesion involving the right  occipital lobe posterolaterally. However, the adjacent areas of T2 FLAIR  hyperintensity have decreased in size.  2. Encephalomalacia involving the temporal lobe inferolaterally to the  right, unchanged.  3. There is an area of enhancement involving the superior and medial  aspect of the left orbit measuring 6 mm in size. I could not exclude a  metastatic deposit. This was present previously and appears unchanged.  Further evaluation with a CT examination of the orbits with and without  contrast is recommended.      This report was finalized on 1/26/2017 9:12 AM by Dr. Russell Varela MD.      Assessment/Plan    This patient has history of metastatic  lung cancer with a  primary tumor in the left lung overlapping areas, biopsy TTF-1 positive. Now we have documented that the patient is EGFR negative, ALK-1 negative, PDL-1 negative.  In other words, the patient has triple negative lung cancer.  In any event, the patient has undergone a stereotactic radiation therapy for a solitary brain metastasis last week.  He handled this well and he has no neurological symptomatology of any nature or either evidence of brain edema.      As stated above he has had a new MRI of the brain that shows stability of his brain metastasis, actually very quiet with no brain edema. Also he has undergone as stated above a CT scan of the chest, abdomen and pelvis to re-evaluate the status of disease that unfortunately shows progression in the lung tumors as well as new bone metastasis and new liver metastasis. The patients ECOG performance status remains 1-2. His kidney function remains marginal with a creatinine of 1.68.     The patient does not have a port.     In other words the patients disease is progressing in multiple sites. He has developed pain in the rib cage on the left side and the options of therapy are as follows:    1. We are going to go head and discontinue the aspirin and remain on Eliquis for the time being. Hopefully this will minimize the hematuria.  2. The patient will discontinue Taxol all together.  3. The patient will initiate a regimen of carboplatinum AUC of 2, 3 weeks out of 4, single agent. The dose of carboplatinum needed to be modified given his marginal kidney function. For the same reason the patient will not be able to receive either Gemzar or Alimta. Because the patient does not have a port, Navelbine is contraindicated to be given to patients through a peripheral IV. We do not want to pursue the placement of a PICC line to deliver Navelbine.   4. The patient has been advised to take Tylenol through the day to minimize the pain in the rib cage and some Lortab at night to  minimize pain at nighttime.   5. We made him an appointment to be seen by Radiation Oncology in consideration of palliative radiation treatment to the rib cage.   6. We asked the patient to remain on the rest of the medicines that he uses, a multitude of them for multiple comorbidities, exception made of the aspirin that has been discontinued.  7.  We found him samples of Eliquis for his ventricular clot. This will remain ongoing for the time being. By the way on the CT scan was not visible in regard any clots in the left ventricle.  8. Stability of his brain metastasis and other bone metastasis will not call for any other medication and specifically given his marginal kidney function I doubt that Prolia or Zometa will be choices for him at this time. I do not think that will change the outcome on this patient whatsoever.    I spent 40 minutes of my time with the patient and wife going through all the details of this, discussed the case with the nurses, discussed the case with Dr. Vargas and gave them copies of the reports of the CT scans and MRIs. I personally reviewed the CT scan of the chest in the PACS system [facility] and I agree with the radiologists interpretation.    The patient will return to be seen by nurse practitioner in a couple of weeks, doctor visit in a month and treatment ongoing as stated.

## 2017-02-02 NOTE — TELEPHONE ENCOUNTER
----- Message from Angelic Sierra RN sent at 2/2/2017  1:38 PM EST -----  Please change all future lab appts to port chair if pt is getting carbo please. thanks

## 2017-02-07 NOTE — PROGRESS NOTES
Physician Weekly Management Note    Diagnosis:     1. Malignant neoplasm of overlapping sites of left lung        Clinical Stage IV (T2, NX, M1b)     Reason for Visit:   Radiation (2/13)    Concurrent Chemo:   No    Notes on Treatment course, Films, Medical progress and Plan:  Doing well. No problems or questions, cont on.    ROS:    Constitutional - Normal - no complaints of fatigue, denies lack of appetite, fever, night sweats or change in weight.  Neck - Normal - denies neck masses, muscle weakness, neck pain, decreased range of motion or swelling.  Cardiovascular - Normal - denies arrhythmias, chest pain, dyspnea, edema, orthopnea or palpitations.  Respiratory - Normal - denies cough, dyspnea, hemoptysis, hiccoughs, pleuritic chest pain or wheezing.  Gastrointestinal - Normal - no complaints of constipation, abdominal pain, diarrhea, heartburn/dyspepsia, hematemesis, hemorrhoids, melena or GI bleeding, nausea, pain or cramping or vomiting.    PHYSICAL EXAM:  Vitals:    02/07/17 1105   BP: 142/85   Pulse: 91   Resp: 16   Temp: 96.7 °F (35.9 °C)   TempSrc: Oral   SpO2: 94%   Weight: 275 lb (125 kg)   PainSc: 0-No pain       Constitutional - Normal - no evidence of impaired alertness, inadequate appearance, premature or advanced chronologic age, uncooperativeness, altered mood, affect or disorientation.  Neck - Normal - no evidence of tender or enlarged lymph nodes, neck abnormalities, restricted range of motion or enlarged thyroid.  Chest - Normal - no evidence of chest abnormalities, tender or enlarged lymph nodes.  Respiratory - Normal - no evidence of abnormal breat sounds, chest abnormalities on palpation and chest abnormalities on percussion and normal breath sounds.  Hematologic/Lymphatic - Normal - no evidence of tender or enlarged axillary lymph nodes nor tender or enlarged neck nodes.    Performance Status:  (2) Ambulatory and capable of self care, unable to carry out work activity, up and about > 50%  "or waking hours    Problem added:  No problems updated.  Medications added: No orders of the defined types were placed in this encounter.    Ancillary referrals made: No orders of the defined types were placed in this encounter.      Technical aspects reviewed:  Weekly OBI approved if applicable? Yes  Weekly port films approved?   Yes  Change requests noted if applicable?  No  Patient setup and plan reviewed?  Yes    Chart Reviewed:  Continue current treatment plan?   Yes  Treatment plan change requested?  No    I have reviewed and marked as \"reviewed\" the current medications, allergies and problem list in the patients EMR.  I have reviewed the patient's medical, surgical  history in detail, reviewed any pertinent lab work and updated the computerized patient record if needed.    Patient's Care Team:  Patient Care Team:  Sergio Green MD as PCP - General (Family Medicine)  Sergio Green MD as Referring Physician (Family Medicine)  Som Garrett MD as Consulting Physician (Cardiology)  Vahid Garcia MD as Consulting Physician (Hematology and Oncology)  Joo Christensen Jr., MD as Consulting Physician (Urology)  Katelynn Quan MD as Consulting Physician (Radiation Oncology)    Seen and approved by:  Katelynn Quan MD, 02/07/2017  "

## 2017-02-14 NOTE — PROGRESS NOTES
Physician Weekly Management Note    Diagnosis:     1. Malignant neoplasm of overlapping sites of left lung        Clinical Stage IV (T2, NX, M1b)     Reason for Visit:   Radiation (7/13)    Concurrent Chemo:   No    Notes on Treatment course, Films, Medical progress and Plan:  Doing well. Pain already improved. No problems or questions, cont on.    ROS:    Constitutional - Normal - no complaints of fatigue, denies lack of appetite, fever, night sweats or change in weight.  Neck - Normal - denies neck masses, muscle weakness, neck pain, decreased range of motion or swelling.  Cardiovascular - Normal - denies arrhythmias, chest pain, dyspnea, edema, orthopnea or palpitations.  Respiratory - Normal - denies cough, dyspnea, hemoptysis, hiccoughs, pleuritic chest pain or wheezing.  Gastrointestinal - Normal - no complaints of constipation, abdominal pain, diarrhea, heartburn/dyspepsia, hematemesis, hemorrhoids, melena or GI bleeding, nausea, pain or cramping or vomiting.    PHYSICAL EXAM:  Vitals:    02/14/17 1125   BP: (!) 154/102   Pulse: 82   Resp: 16   Temp: 96.5 °F (35.8 °C)   TempSrc: Oral   SpO2: 90%   PainSc: 0-No pain       Constitutional - Normal - no evidence of impaired alertness, inadequate appearance, premature or advanced chronologic age, uncooperativeness, altered mood, affect or disorientation.  Neck - Normal - no evidence of tender or enlarged lymph nodes, neck abnormalities, restricted range of motion or enlarged thyroid.  Chest - Normal - no evidence of chest abnormalities, tender or enlarged lymph nodes.  Respiratory - Normal - no evidence of abnormal breat sounds, chest abnormalities on palpation and chest abnormalities on percussion and normal breath sounds.  Hematologic/Lymphatic - Normal - no evidence of tender or enlarged axillary lymph nodes nor tender or enlarged neck nodes.    Performance Status:  (2) Ambulatory and capable of self care, unable to carry out work activity, up and about > 50%  "or waking hours    Problem added:  No problems updated.  Medications added: No orders of the defined types were placed in this encounter.    Ancillary referrals made: No orders of the defined types were placed in this encounter.      Technical aspects reviewed:  Weekly OBI approved if applicable? Yes  Weekly port films approved?   Yes  Change requests noted if applicable?  No  Patient setup and plan reviewed?  Yes    Chart Reviewed:  Continue current treatment plan?   Yes  Treatment plan change requested?  No    I have reviewed and marked as \"reviewed\" the current medications, allergies and problem list in the patients EMR.  I have reviewed the patient's medical, surgical  history in detail, reviewed any pertinent lab work and updated the computerized patient record if needed.    Patient's Care Team:  Patient Care Team:  Sergio Green MD as PCP - General (Family Medicine)  Sergio Green MD as Referring Physician (Family Medicine)  Som Garrett MD as Consulting Physician (Cardiology)  aVhid Garcia MD as Consulting Physician (Hematology and Oncology)  Joo Christensen Jr., MD as Consulting Physician (Urology)  Katelynn Quan MD as Consulting Physician (Radiation Oncology)    Seen and approved by:  Katelynn Quan MD, 02/07/2017  "

## 2017-02-24 NOTE — PROGRESS NOTES
Subjective     REASON FOR FOLLOW UP: HISTORY OF R KIDNEY CANCER S/P NEPHRECTOMY, HISTORY OF BLADDER CANCER, NOW WITH LUNG NODULES , PATHOLOGY OF LEFT LUNG MASS SHOWS TYPICAL ADENOCARCINOMA OF THE LUNG  EGFR ALK1 PDL-1 negative, CERVICAL SPINE AND BRAIN METASTASIS, BRAIN EDEMA, CLOTH LEFT ATRIUM, ANTICOAGULATION.                                 History of Present Illness   Mr. Mendelsberg is a gisell 81 year old gentleman with the above medical history here today for scheduled Carboplatin that he receives 3 out of 4 weeks. He finished radiation yesterday and reports feeling relatively well today. He denies fever, chills, increased shortness of breath. He remains on 2liters nasal canula O2 at all times. He continues to experience a productive cough throughout the day. This has been chronic and he reports increased pain in his back when he coughs. He denies pain when he is not coughing. He denies chest pain. His sputum is clear. He does feel the pain has become worse with radiation and this is actually the area the pain derives from.  He does have Norco at home but takes this very infrequently. When he does take it is usually at night before bed.  He also takes no cough medication.       He is eating and drinking well. No diarrhea or constipation. He denies mouth irritation. HE denies swelling.    His platelets are just [id number] today. He denies abnormal bleeding or bruising. He denies numbness or tingling. He denies headaches.    He continues to take Eloquis twice daily and is requesting samples today.          Past Medical History   Diagnosis Date   • Aftercare following surgery    • Anemia      h/o multifactorial anemia   • Atrial fibrillation    • Benign prostatic hypertrophy    • CAD (coronary artery disease)    • Chronic kidney disease      STAGE III   • Fatigue    • H/O thrombocytopenia    • H/O transfusion of packed red blood cells    • H/O: pneumonia 2013     right lower lobe   • History of gross hematuria    •  Hyperlipidemia    • Hypertension    • Pulmonary embolism    • Pulmonary hypertension    • Renal insufficiency syndrome    • Transitional cell carcinoma of kidney 2013     status post nephrectomy   • Urothelial carcinoma      Of the bladder - Underwent transurethral reection w/possible residual tumor in the ureter  with concurrent chemoradiation         Past Surgical History   Procedure Laterality Date   • Bladder surgery     • Kidney surgery     • Nephrectomy Right 01/2013   • Cholecystectomy       35 years ago   • Cataract extraction          Current Outpatient Prescriptions on File Prior to Visit   Medication Sig Dispense Refill   • apixaban (ELIQUIS) 5 MG tablet tablet Take 1 tablet by mouth 2 (Two) Times a Day. 60 tablet 5   • Calcium-Vitamin D 600-200 MG-UNIT per tablet Take 1 tablet by mouth 2 (Two) Times a Day.     • CARTIA  MG 24 hr capsule TAKE 1 CAPSULE EVERY DAY 90 capsule 1   • diltiazem (TIAZAC) 300 MG 24 hr capsule Take 300 mg by mouth Daily.     • finasteride (PROSCAR) 5 MG tablet TAKE 1 TABLET DAILY 90 tablet 1   • folic acid (FOLVITE) 400 MCG tablet Take 400 mcg by mouth Daily.     • furosemide (LASIX) 40 MG tablet TAKE 1 TABLET EVERY DAY 90 tablet 1   • Magnesium Hydroxide (MILK OF MAGNESIA PO) Take  by mouth As Needed.     • metoprolol tartrate (LOPRESSOR) 50 MG tablet Take 50 mg by mouth 2 (two) times a day.     • Multiple Vitamins-Minerals (MULTI COMPLETE) capsule Take 1 tablet by mouth Daily.     • Polyethylene Glycol 3350 (MIRALAX PO) Take 17 Units by mouth Every Night.     • potassium chloride (K-DUR,KLOR-CON) 10 MEQ CR tablet TAKE 1 TABLET EVERY DAY (SUBSTITUTED FOR  K-DUR) 90 tablet 1   • Probiotic capsule Take 1 capsule by mouth 2 (Two) Times a Day.     • Pyridoxine HCl (VITAMIN B-6 PO) Take 100 mg by mouth Daily.     • tamsulosin (FLOMAX) 0.4 MG capsule 24 hr capsule TAKE 1 CAPSULE EVERY DAY 90 capsule 0   • vitamin B-12 (CYANOCOBALAMIN) 1000 MCG tablet Take 1,000 mcg by mouth  Daily.     • vitamin C (ASCORBIC ACID) 500 MG tablet Take 500 mg by mouth Every Night.     • vitamin E 1000 UNIT capsule Take 400 Units by mouth Daily.     • [DISCONTINUED] aspirin 81 MG tablet Take 81 mg by mouth Daily.     • [DISCONTINUED] metoprolol tartrate (LOPRESSOR) 100 MG tablet TAKE 1 TABLET TWICE DAILY 180 tablet 1   • [DISCONTINUED] predniSONE (DELTASONE) 10 MG tablet Take 2 tablets by mouth Daily. Start 2 days after each chemo, take it in the morning, and for 3 days only 20 tablet 1     No current facility-administered medications on file prior to visit.         ALLERGIES:  No Known Allergies     Social History     Social History   • Marital status:      Spouse name: N/A   • Number of children: N/A   • Years of education: High school     Occupational History   •  [employer]     Social History Main Topics   • Smoking status: Former Smoker     Packs/day: 1.00     Years: 15.00     Types: Cigarettes   • Smokeless tobacco: Former User   • Alcohol use Yes      Comment: Occasional   • Drug use: No   • Sexual activity: Not Asked     Other Topics Concern   • None     Social History Narrative        Family History   Problem Relation Age of Onset   • Cancer Father    • Heart disease Brother     ONCOLOGIC/HEMATOLOGIC HISTORY     HEMATOLOGIC/ONCOLOGIC HISTORY: At the time of his initial presentation to our practice, Mr. Mendelsberg is a 77-year-old gentleman who was initially seen in consultation as an inpatient at [facility] in June 2013. He was admitted at that time for hematuria after TURBT and we will consulted by his primary care provider for anemia. He did receive IV Venofer was started on oral ferrous sulfate. He was on Warfarin, which had been stopped. Overall it was felt that his hematuria was secondary to bladder tumors as well as the fact that he was on anticoagulants and antiplatelet agents. During that hospitalization he had some cardiopulmonary issues as well  requiring the attention of Cardiology and Pulmonary Medicine.     He was then treated with Zyvox for a urinary tract infection and received intravesical BCG x3 that was stopped due to recurrent hematuria. Hematuria became more severe and ultimately Dr. Christensen decided to repeat cystoscopy. This was performed on 10/08/2013 with pathology report reviewed under Accession # [id number].  The operative note states that there is a tumor extending up into the right ureter. There were two other tumors that were resected as well and all were resected as deeply as possible. The pathology report shows two tumors, one from the left bladder floor and one from the right bladder floor, both showing papillary urothelial carcinoma focally high grade.  Both showed that the detrusor muscle was present, but there was no invasion into the muscle noted on either specimen. The tumor on the left bladder floor showed no definite invasion of the lamina propria, but the tumor on the right bladder floor did show some invasion into the lamina propria. Again, neither by pathology showed invasion into the detrusor muscle. Prior to discharge the patient did receive intravesical BCG. He overall did well and was discharged from the hospital.     His hematuria improved after that. He did receive another intravesical chemotherapy agent on Friday 10/25/1023 according to the patient, but he is not sure which agent this was.     While he was hospitalized he was seen by Radiation Oncology to consider radiation to the bladder. We have been asked to consider concurrent chemotherapy as well, considering the fact that he has recurrent bladder tumors that are recurring quickly and are high grade tumors.     He had a PET scan performed on 11/18/2013. This shows some small mildly metabolically active lymph nodes within the mediastinum. There is a large granuloma in the left lower lobe. The findings are consistent with granulomatous disease. There is a 1.5 cm  indeterminate nodule in the left lower lobe with low level PET activity. This will be followed. There are some small lymph nodes within the retroperitoneum, none of which demonstrate significant metabolic activity.     Chemotherapy with weekly carboplatin and Taxol to be administered along with radiation therapy was initiated on 12/03/2013.     Of note, he has a Pseudomonas urinary tract infection that is asymptomatic and on 12/04/2013 he is to start seven days of IV antibiotics for this per Urology.     As of 01/02/2014, he received week 5 of therapy with carboplatin and paclitaxel. He has mild thrombocytopenia with platelets of 89,000. He is receiving intravesicular gentamicin with plan for three doses of this. He is reasonably asymptomatic but is having some mild dysuria and urinary frequency which could be radiation cystitis.     He completed 6 cycles of weekly carboplatin/paclitaxel on 01/09/2014 and as of 01/16/2014 should finish radiation therapy on 01/20/2014.  He is having symptoms consistent with radiation cystitis and proctitis.      He had a surveillance CT scan performed on 04/11/2014 that showed no new findings.  Vascular calcifications noted.  Bilateral L5 spondylolysis noted.      A CT of the abdomen and pelvis without contrast on 08/10/2014 shows some thickening of the bladder walls as well as some mild dilatation of the left distal ureter. These results will be communicated to Dr. Christensen as the patient has not seen him recently. Otherwise he will followup in 4 months. His hemoglobin is stable as is his renal insufficiency.       Review of Systems   Constitutional: Negative.    HENT: Negative.    Eyes: Negative.    Respiratory: Negative.         See HPI   Cardiovascular: Negative.    Gastrointestinal: Negative.    Endocrine: Negative.    Genitourinary: Negative.    Musculoskeletal:        See HPI   Skin: Negative.    Allergic/Immunologic: Negative.    Neurological: Negative.    Hematological:  "Negative.         See HPI   Psychiatric/Behavioral: Negative.           Objective     Vitals:    02/24/17 0809   BP: 120/78   Pulse: 84   Resp: 18   Temp: 98 °F (36.7 °C)   TempSrc: Oral   SpO2: 93%  Comment: 2 L   Weight: 271 lb 12.8 oz (123 kg)   Height: 74\" (188 cm)   PainSc:   6   PainLoc: Shoulder  Comment: under lt shoulder     Current Status 2/24/2017   ECOG score 0       Physical Exam    GENERAL:  Well-developed, well-nourished  Patient  in no acute distress. He is seen today with his wife. He is using portable O2 through nasal cannula.   SKIN:  Warm, dry without rashes, purpura or petechiae.  HEENT:  Pupils were equal and reactive to light and accomodation, conjunctivas non injected, no pterigion, normal extraocular movements, normal visual acuity.   Mouth mucosa was moist, no exudates in oropharynx, normal gum line, normal roof of the mouth and pillars, normal papillations of the tongue.Ear canals were normal, as well tympanic membranes, normal hearing acuity.No pain in mastoid area or erythema.  NECK:  Supple with good range of motion; no thyromegaly or masses, no JVD or bruits, no cervical adenopathies.No carotid arteries pain, no carotid abnormal pulsation or arterial dance.  LYMPHATICS:  No cervical, supraclavicular, axillary, epitrochlear or inguinal adenopathy.  CHEST:   Lungs clear to percussion and auscultation, DECREASED breath sounds bilaterally.  CARDIAC AND VASCULAR: Normal rate and regular rhythm, without murmurs, rubs or S3 or S4 right or left sided gallops.   ABDOMEN:  Soft, nontender with no organomegaly or masses, no ascites,no abdominal painLARGE R FLANK HERNIA AT NEPHRECTOMY SITE  GENITAL: Not  Performed.  EXTREMITIES  AND SPINE:  No clubbing, cyanosis 2+ LE edema, no deformities or pain .No kyphosis, scoliosis, deformities or pain in spine, ribs or pelvic bone.  NEUROLOGICAL:  Patient was awake, alert, oriented to time, person and place,normal gait and coordination.      RECENT " LABS:  Hematology WBC   Date Value Ref Range Status   02/24/2017 4.43 4.00 - 10.00 10*3/mm3 Final   03/10/2015 6.49 4.50 - 10.70 K/Cumm Final   03/10/2015 See below: (A) None Seen /hpf Final     Comment:     Too numerous to count     RBC   Date Value Ref Range Status   02/24/2017 3.70 (L) 4.30 - 5.50 10*6/mm3 Final   03/31/2016 4.69  Final   03/10/2015 4.67 4.60 - 6.00 Million Final     HEMOGLOBIN   Date Value Ref Range Status   02/24/2017 11.4 (L) 13.5 - 16.5 g/dL Final   03/31/2016 13.8 g/dL Final   03/10/2015 13.7 13.7 - 17.6 g/dL Final     HEMATOCRIT   Date Value Ref Range Status   02/24/2017 35.2 (L) 40.0 - 49.0 % Final   03/31/2016 43.4 % Final   03/10/2015 43.7 40.4 - 52.2 % Final     PLATELETS   Date Value Ref Range Status   02/24/2017 83 (L) 150 - 375 10*3/mm3 Final     Comment:     plts tito=[id number]   03/10/2015 165 140 - 500 K/Cumm Final                  Assessment/Plan        1. This patient has history of metastatic  lung cancer with a primary tumor in the left lung overlapping areas, biopsy TTF-1 positive. Now we have documented that the patient is EGFR negative, ALK-1 negative, PDL-1 negative.  In other words, the patient has triple negative lung cancer. He completed radiation therapy yesterday and is here today for scheduled Carboplatin that he receives on a 3 out of 4 week schedule.     2. Thrombocytopenia. His platelets today are 70496. He is not experiencing bleeding or bruising.    3. Chronic cough related to his disease. He is not taking cough medicine for this.    4. Cancer related pain. He does have Norco on hand at home but only seldomly takes this, particularly at night.      5. Chronic anticoagulation. He takes Eloquis twice daily and he is in need of samples today.    Plan:    1. I have talked to Dr. Garcia and we will plan to hold Carboplatin today. He will return next week to resume treatment with a 15% dose reduction.  2. I have provided the patient a prescription for Hycodan today  #150ml.  3. We provided him with samples of Eloquis today.   4. We have reviewed signs and symptoms for which he needs to seek immediate medical attention such as increase shortness of breath or chest pain.

## 2017-02-24 NOTE — PROGRESS NOTES
Patient's wife asked for a financial packet to help with doctor bills and treatment costs. I have given her the [facility] financial packet as well as the SW and my contact information.

## 2017-03-03 NOTE — PROGRESS NOTES
Subjective     REASON FOR FOLLOW UP: HISTORY OF R KIDNEY CANCER S/P NEPHRECTOMY, HISTORY OF BLADDER CANCER, NOW WITH LUNG NODULES , PATHOLOGY OF LEFT LUNG MASS SHOWS TYPICAL ADENOCARCINOMA OF THE LUNG  EGFR ALK1 PDL-1 negative, CERVICAL SPINE AND BRAIN METASTASIS, BRAIN EDEMA, CLOTH LEFT ATRIUM, ANTICOAGULATION.                                 History of Present Illness   Mr. Mendelsberg is a gisell 81 year old gentleman with the above medical history here today for scheduled Carboplatin that he receives 3 out of 4 weeks. Prior records have been reviewed. He's feeling well today but his counts are lower. No fever, nausea, vomiting. His cough has slightly improved with cough syrup. He has some back pain where he had radiation but is using pain medications that help. He's also using some capsaicin cream. No bleeding. Chronic shortness of air. Appetite okay. Fatigue is stable.       Past Medical History   Diagnosis Date   • Aftercare following surgery    • Anemia      h/o multifactorial anemia   • Atrial fibrillation    • Benign prostatic hypertrophy    • CAD (coronary artery disease)    • Chronic kidney disease      STAGE III   • Fatigue    • H/O thrombocytopenia    • H/O transfusion of packed red blood cells    • H/O: pneumonia 2013     right lower lobe   • History of gross hematuria    • Hyperlipidemia    • Hypertension    • Pulmonary embolism    • Pulmonary hypertension    • Renal insufficiency syndrome    • Transitional cell carcinoma of kidney 2013     status post nephrectomy   • Urothelial carcinoma      Of the bladder - Underwent transurethral reection w/possible residual tumor in the ureter  with concurrent chemoradiation         Past Surgical History   Procedure Laterality Date   • Bladder surgery     • Kidney surgery     • Nephrectomy Right 01/2013   • Cholecystectomy       35 years ago   • Cataract extraction          Current Outpatient Prescriptions on File Prior to Visit   Medication Sig Dispense Refill    • apixaban (ELIQUIS) 5 MG tablet tablet Take 1 tablet by mouth 2 (Two) Times a Day. 60 tablet 5   • Calcium-Vitamin D 600-200 MG-UNIT per tablet Take 1 tablet by mouth 2 (Two) Times a Day.     • CARTIA  MG 24 hr capsule TAKE 1 CAPSULE EVERY DAY 90 capsule 1   • diltiazem (TIAZAC) 300 MG 24 hr capsule Take 300 mg by mouth Daily.     • finasteride (PROSCAR) 5 MG tablet TAKE 1 TABLET DAILY 90 tablet 1   • folic acid (FOLVITE) 400 MCG tablet Take 400 mcg by mouth Daily.     • furosemide (LASIX) 40 MG tablet TAKE 1 TABLET EVERY DAY 90 tablet 1   • HYDROcodone-homatropine (HYCODAN) 5-1.5 MG/5ML syrup Take 5 mL by mouth Every 6 (Six) Hours As Needed for cough. 120 mL 0   • Magnesium Hydroxide (MILK OF MAGNESIA PO) Take  by mouth As Needed.     • metoprolol tartrate (LOPRESSOR) 50 MG tablet Take 50 mg by mouth 2 (two) times a day.     • Multiple Vitamins-Minerals (MULTI COMPLETE) capsule Take 1 tablet by mouth Daily.     • Polyethylene Glycol 3350 (MIRALAX PO) Take 17 Units by mouth Every Night.     • potassium chloride (K-DUR,KLOR-CON) 10 MEQ CR tablet TAKE 1 TABLET EVERY DAY (SUBSTITUTED FOR  K-DUR) 90 tablet 1   • Probiotic capsule Take 1 capsule by mouth 2 (Two) Times a Day.     • Pyridoxine HCl (VITAMIN B-6 PO) Take 100 mg by mouth Daily.     • vitamin B-12 (CYANOCOBALAMIN) 1000 MCG tablet Take 1,000 mcg by mouth Daily.     • vitamin C (ASCORBIC ACID) 500 MG tablet Take 500 mg by mouth Every Night.     • vitamin E 1000 UNIT capsule Take 400 Units by mouth Daily.     • [DISCONTINUED] tamsulosin (FLOMAX) 0.4 MG capsule 24 hr capsule TAKE 1 CAPSULE EVERY DAY 90 capsule 0     No current facility-administered medications on file prior to visit.         ALLERGIES:  No Known Allergies     Social History     Social History   • Marital status:      Spouse name: N/A   • Number of children: N/A   • Years of education: High school     Occupational History   •  [employer]     Social History  "Main Topics   • Smoking status: Former Smoker     Packs/day: 1.00     Years: 15.00     Types: Cigarettes   • Smokeless tobacco: Former User   • Alcohol use Yes      Comment: Occasional   • Drug use: No   • Sexual activity: Not Asked     Other Topics Concern   • None     Social History Narrative        Family History   Problem Relation Age of Onset   • Cancer Father    • Heart disease Brother          Review of Systems   Constitutional: Positive for fatigue. Negative for activity change, appetite change and fever.   HENT: Negative.    Eyes: Negative.    Respiratory: Positive for shortness of breath (chronic SOB, on oxygen).         See HPI   Cardiovascular: Negative.    Gastrointestinal: Negative.    Endocrine: Negative.    Genitourinary: Negative.    Musculoskeletal: Positive for back pain.        Back pain   Skin: Negative.    Allergic/Immunologic: Negative.    Neurological: Negative.    Hematological: Negative.  Negative for adenopathy. Does not bruise/bleed easily.   Psychiatric/Behavioral: Negative.           Objective     Vitals:    03/03/17 0809   BP: 140/82   Pulse: 76   Resp: 18   Temp: 98 °F (36.7 °C)   SpO2: (!) 88%  Comment: 1L   Weight: 270 lb 6.4 oz (123 kg)   Height: 75\" (190.5 cm)   PainSc: 6  Comment: back pain     Current Status 3/3/2017   ECOG score 1       Physical Exam    GENERAL:  Elderly male in NAD on oxygen via NC  SKIN:  Warm, dry without rashes, purpura or petechiae.  NECK:  Supple with good range of motion; no thyromegaly  LYMPHATICS:  No cervical, supraclavicular, axillary adenopathy.  CHEST:  Lungs clear to percussion and auscultation with decreased air exchange bilaterally; palpable left flank mass  CARDIAC:  Regular rate and rhythm without murmurs, rubs or gallops. Normal S1,S2. No edema  EXTREMITIES:  No clubbing, cyanosis or edema.  PSYCHIATRIC:  Normal affect and mood.      RECENT LABS:  Results for orders placed or performed in visit on 03/03/17   CBC Auto Differential   Result " Value Ref Range    WBC 1.70 (L) 4.00 - 10.00 10*3/mm3    RBC 3.52 (L) 4.30 - 5.50 10*6/mm3    Hemoglobin 10.7 (L) 13.5 - 16.5 g/dL    Hematocrit 33.8 (L) 40.0 - 49.0 %    MCV 96.0 83.0 - 97.0 fL    MCH 30.4 27.0 - 33.0 pg    MCHC 31.7 (L) 32.0 - 35.0 g/dL    RDW 17.2 (H) 11.7 - 14.5 %    RDW-SD 60.5 (H) 37.0 - 49.0 fl    MPV 9.9 8.9 - 12.1 fL    Platelets 184 150 - 375 10*3/mm3    Neutrophil % 45.3 39.0 - 75.0 %    Lymphocyte % 32.9 20.0 - 49.0 %    Monocyte % 10.0 4.0 - 12.0 %    Eosinophil % 11.2 (H) 1.0 - 5.0 %    Basophil % 0.6 0.0 - 1.1 %    Immature Grans % 0.0 0.0 - 0.5 %    Neutrophils, Absolute 0.77 (L) 1.50 - 7.00 10*3/mm3    Lymphocytes, Absolute 0.56 (L) 1.00 - 3.50 10*3/mm3    Monocytes, Absolute 0.17 (L) 0.25 - 0.80 10*3/mm3    Eosinophils, Absolute 0.19 0.00 - 0.36 10*3/mm3    Basophils, Absolute 0.01 0.00 - 0.10 10*3/mm3    Immature Grans, Absolute 0.00 0.00 - 0.03 10*3/mm3    nRBC 0.0 0.0 - 0.0 /100 WBC       Assessment/Plan        1. Metastatic  lung cancer with a primary tumor in the left lung overlapping areas, biopsy TTF-1 positive. Now we have documented that the patient is EGFR negative, ALK-1 negative, PDL-1 negative.  He's status post palliative RT and undergoing palliative single agent carboplatin. The plan was for three out of four weeks. He was able to tolerate three doses in a row, but chemo was delayed last week due to thrombocytopenia and will have to be delayed again today for neutropenia.    - Will have him return in 1 week to restart carboplatin, but may need to change regimen to every other week or two out of three weeks with a dose reduction. He will see Dr Garcia on follow up and they can further discuss change in his regimen.      2. Thrombocytopenia. Improved.     3. Chemo-induced neutropenia. Monitor. Will need either dose reductions or timing adjustments for future. Will call if fever.     4. Cancer related pain. He does have Norco on hand at home but only seldomly takes  this, particularly at night.    5. Chronic anticoagulation. He takes Eloquis twice daily    Plan:    1. Hold carbo again today. Return in 1 week for carbo and visit with DR Garcia to discuss how they want to proceed in the future given multiple chemo delays.   2. Continue Eliquis and pain medications.   3. We have reviewed signs and symptoms for which he needs to seek immediate medical attention such as increase shortness of breath, fever or chest pain.

## 2017-03-13 NOTE — PROGRESS NOTES
Subjective     REASON FOR FOLLOW UP: HISTORY OF R KIDNEY CANCER S/P NEPHRECTOMY, HISTORY OF BLADDER CANCER, NOW WITH LUNG NODULES , PATHOLOGY OF LEFT LUNG MASS SHOWS TYPICAL ADENOCARCINOMA OF THE LUNG  EGFR ALK1 PDL-1 negative, CERVICAL SPINE AND BRAIN METASTASIS, BRAIN EDEMA, CLOTH LEFT ATRIUM, ANTICOAGULATION.                                 History of Present Illness   Mr. Mendelsberg is a gisell 81 year old gentleman with the above medical history here today for scheduled Carboplatin. He is here today in company of his wife stating that she continues having left scapular pain; it is difficult for him to tell where the pain is coming from, sometimes movement is able to trigger the pain and sometimes it is associated with cough and associated with shortness of breath.  So far he is not having any modification in his usual degree of shortness of breath and he remains on oxygen. He has not had any hemoptysis and neither purulence in the sputum and neither pleuritic pain.  Sometimes position triggers modification in intensity of the pain and he requires now Norco on a regular basis 3 times a day. This is producing some element of bladder outlet obstruction. He remains on Flomax. Urination is ongoing with smile mild difficulty, no hematuria, no urinary infection. He has no fever and no chills. His appetite is good, his bowel function is normal as long as he takes milk of magnesia.  He is not having any neurological symptomatology.  He has a significant degree of fatigue.  Hematological parameters are now improved and he will require CT scans in a few days as well as an MRI scan of the brain in a few days to restage his disease and evaluate response to carboplatin.          Past Medical History   Diagnosis Date   • Aftercare following surgery    • Anemia      h/o multifactorial anemia   • Atrial fibrillation    • Benign prostatic hypertrophy    • CAD (coronary artery disease)    • Chronic kidney disease      STAGE III    • Fatigue    • H/O thrombocytopenia    • H/O transfusion of packed red blood cells    • H/O: pneumonia 2013     right lower lobe   • History of gross hematuria    • Hyperlipidemia    • Hypertension    • Pulmonary embolism    • Pulmonary hypertension    • Renal insufficiency syndrome    • Transitional cell carcinoma of kidney 2013     status post nephrectomy   • Urothelial carcinoma      Of the bladder - Underwent transurethral reection w/possible residual tumor in the ureter  with concurrent chemoradiation         Past Surgical History   Procedure Laterality Date   • Bladder surgery     • Kidney surgery     • Nephrectomy Right 01/2013   • Cholecystectomy       35 years ago   • Cataract extraction          Current Outpatient Prescriptions on File Prior to Visit   Medication Sig Dispense Refill   • apixaban (ELIQUIS) 5 MG tablet tablet Take 1 tablet by mouth 2 (Two) Times a Day. 60 tablet 5   • Calcium-Vitamin D 600-200 MG-UNIT per tablet Take 1 tablet by mouth 2 (Two) Times a Day.     • CARTIA  MG 24 hr capsule TAKE 1 CAPSULE EVERY DAY 90 capsule 1   • diltiazem (TIAZAC) 300 MG 24 hr capsule Take 300 mg by mouth Daily.     • finasteride (PROSCAR) 5 MG tablet TAKE 1 TABLET DAILY 90 tablet 1   • folic acid (FOLVITE) 400 MCG tablet Take 400 mcg by mouth Daily.     • furosemide (LASIX) 40 MG tablet Take 1 tablet by mouth Daily. Needs appt 30 tablet 0   • HYDROcodone-homatropine (HYCODAN) 5-1.5 MG/5ML syrup Take 5 mL by mouth Every 6 (Six) Hours As Needed for cough. 120 mL 0   • Magnesium Hydroxide (MILK OF MAGNESIA PO) Take  by mouth As Needed.     • metoprolol tartrate (LOPRESSOR) 50 MG tablet Take 50 mg by mouth 2 (two) times a day.     • Multiple Vitamins-Minerals (MULTI COMPLETE) capsule Take 1 tablet by mouth Daily.     • Polyethylene Glycol 3350 (MIRALAX PO) Take 17 Units by mouth Every Night.     • potassium chloride (K-DUR,KLOR-CON) 10 MEQ CR tablet TAKE 1 TABLET EVERY DAY (SUBSTITUTED FOR  K-DUR) 90  tablet 1   • Probiotic capsule Take 1 capsule by mouth 2 (Two) Times a Day.     • Pyridoxine HCl (VITAMIN B-6 PO) Take 100 mg by mouth Daily.     • tamsulosin (FLOMAX) 0.4 MG capsule 24 hr capsule Take 1 capsule by mouth Daily. 90 capsule 1   • vitamin B-12 (CYANOCOBALAMIN) 1000 MCG tablet Take 1,000 mcg by mouth Daily.     • vitamin C (ASCORBIC ACID) 500 MG tablet Take 500 mg by mouth Every Night.     • vitamin E 1000 UNIT capsule Take 400 Units by mouth Daily.       No current facility-administered medications on file prior to visit.         ALLERGIES:  No Known Allergies     Social History     Social History   • Marital status:      Spouse name: N/A   • Number of children: N/A   • Years of education: High school     Occupational History   •  [employer]     Social History Main Topics   • Smoking status: Former Smoker     Packs/day: 1.00     Years: 15.00     Types: Cigarettes   • Smokeless tobacco: Former User   • Alcohol use Yes      Comment: Occasional   • Drug use: No   • Sexual activity: Not on file     Other Topics Concern   • Not on file     Social History Narrative        Family History   Problem Relation Age of Onset   • Cancer Father    • Heart disease Brother          Review of Systems   Constitutional: Positive for fatigue. Negative for activity change, appetite change and fever.   HENT: Negative.    Eyes: Negative.    Respiratory: Positive for shortness of breath (chronic SOB, on oxygen).         See HPI   Cardiovascular: Negative.    Gastrointestinal: Negative.    Endocrine: Negative.    Genitourinary: Negative.    Musculoskeletal: Positive for back pain.        Back pain   Skin: Negative.    Allergic/Immunologic: Negative.    Neurological: Negative.    Hematological: Negative.  Negative for adenopathy. Does not bruise/bleed easily.   Psychiatric/Behavioral: Negative.           Objective     There were no vitals filed for this visit.  Current Status 3/13/2017   ECOG score  0       Physical Exam    GENERAL:  Elderly male in NAD on oxygen via NC  SKIN:  Warm, dry without rashes, purpura or petechiae.  NECK:  Supple with good range of motion; no thyromegaly  LYMPHATICS:  No cervical, supraclavicular, axillary adenopathy.  CHEST:  Lungs clear to percussion and auscultation with decreased air exchange bilaterally; palpable left flank mass  CARDIAC:  Regular rate and rhythm without murmurs, rubs or gallops. Normal S1,S2. No edema  EXTREMITIES:  No clubbing, cyanosis or edema.  PSYCHIATRIC:  Normal affect and mood.      RECENT LABS:  Results for orders placed or performed in visit on 03/13/17   CBC Auto Differential   Result Value Ref Range    WBC 7.40 4.00 - 10.00 10*3/mm3    RBC 3.84 (L) 4.30 - 5.50 10*6/mm3    Hemoglobin 11.8 (L) 13.5 - 16.5 g/dL    Hematocrit 37.1 (L) 40.0 - 49.0 %    MCV 96.6 83.0 - 97.0 fL    MCH 30.7 27.0 - 33.0 pg    MCHC 31.8 (L) 32.0 - 35.0 g/dL    RDW 17.0 (H) 11.7 - 14.5 %    RDW-SD 59.6 (H) 37.0 - 49.0 fl    MPV 9.7 8.9 - 12.1 fL    Platelets 263 150 - 375 10*3/mm3    Neutrophil % 73.4 39.0 - 75.0 %    Lymphocyte % 13.2 (L) 20.0 - 49.0 %    Monocyte % 6.9 4.0 - 12.0 %    Eosinophil % 1.2 1.0 - 5.0 %    Basophil % 0.8 0.0 - 1.1 %    Immature Grans % 4.5 (H) 0.0 - 0.5 %    Neutrophils, Absolute 5.43 1.50 - 7.00 10*3/mm3    Lymphocytes, Absolute 0.98 (L) 1.00 - 3.50 10*3/mm3    Monocytes, Absolute 0.51 0.25 - 0.80 10*3/mm3    Eosinophils, Absolute 0.09 0.00 - 0.36 10*3/mm3    Basophils, Absolute 0.06 0.00 - 0.10 10*3/mm3    Immature Grans, Absolute 0.33 (H) 0.00 - 0.03 10*3/mm3    nRBC 0.0 0.0 - 0.0 /100 WBC       Assessment/Plan        1. Metastatic  lung cancer with a primary tumor in the left lung overlapping areas, biopsy TTF-1 positive. Now we have documented that the patient is EGFR negative, ALK-1 negative, PDL-1 negative.  He's status post palliative RT and undergoing palliative single agent carboplatin.  The patient has had a lot of issues in regard to  hematological toxicity including neutropenia with no fever and thrombocytopenia with no bleeding.  He remains anticoagulated with Eliquis in the background of thrombus in the left ventricle and left atrium.     So far, no embolic phenomenon.  So far, no clinical bleeding.  The amount of pain that he is having now close to the left scapula is bothersome and makes me wonder about bone metastases.  Palpation of this anatomical site triggers some pain in areas between the spine, the rib cage and the scapula inner edge.      His COPD and his atrial fibrillation remain about the same, he remains on oxygen and he has an element of bladder outlet obstruction associated with the use of narcotic medication.  He remains on Flomax.      RECOMMENDATIONS:    1.  He will proceed with his carboplatin today at the dose of 150 mg IV and he is going to proceed with a CT scan of the chest, abdomen and pelvis in a few days as well as an MRI scan of the brain in a few days to restage his disease.  Obviously given his creatinine at 1.8, the patient will not receive any IV contrast before either of these tests.  Otherwise I will review him back in a week and see how he is doing.  I would not be surprised if he has bone metastases in the spine or in the rib cage that will require a touch of radiation treatment.    2.  Will see how things evolve.  He does not require any pain medication today for refill and he will remain on his Eliquis. Again, there is no clinical bleeding so far.

## 2017-03-19 PROBLEM — A41.9 SEPSIS (HCC): Status: ACTIVE | Noted: 2017-01-01

## 2017-03-19 NOTE — PLAN OF CARE
Problem: Patient Care Overview (Adult)  Goal: Plan of Care Review  Outcome: Ongoing (interventions implemented as appropriate)    03/19/17 0633   Coping/Psychosocial Response Interventions   Plan Of Care Reviewed With patient;spouse   Patient Care Overview   Progress no change   Outcome Evaluation   Outcome Summary/Follow up Plan admit from ER for sats in 70's put on Bipap, Pt Alert and oriented Hr in 130 afib with temp 99.0. Fluid bolus running         Problem: Respiratory Insufficiency (Adult)  Goal: Identify Related Risk Factors and Signs and Symptoms  Outcome: Ongoing (interventions implemented as appropriate)  Goal: Acid/Base Balance  Outcome: Ongoing (interventions implemented as appropriate)  Goal: Effective Ventilation  Outcome: Ongoing (interventions implemented as appropriate)

## 2017-03-19 NOTE — ED PROVIDER NOTES
EMERGENCY DEPARTMENT ENCOUNTER    CHIEF COMPLAINT  Chief Complaint: SOA and palpitations  History given by: Patient and family  History limited by: Nothing  Room Number: 14/14  PMD: Sergio Green MD (Inactive)      HPI:  Pt is a 81 y.o. male who presents complaining of SOA and palpitations onset 0300 this morning. Pt reports chills. Pt denies CP. The pt was given Nitro at home. The pt has a hx of a fib and lung/liver/bone cancer. The pt's family states the pt's O2 STAT was in the 60s.    Duration: Since 0300 this morning  Onset: Gradual  Timing: Constant  Radiation: None  Quality: Irregular beat  Intensity/Severity: Moderate  Progression: Unchanged  Associated Symptoms: Chills  Aggravating Factors: Nothing  Alleviating Factors: Nothing  Previous Episodes: None  Treatment before arrival: Nitro    PAST MEDICAL HISTORY  Active Ambulatory Problems     Diagnosis Date Noted   • Anxiety 03/31/2016   • Cough 03/31/2016   • Chronic kidney disease, stage III (moderate) 03/31/2016   • Difficulty breathing 03/31/2016   • Fatigue 03/31/2016   • Palpitations 03/31/2016   • Hypertension 03/31/2016   • Impaired fasting glucose 03/31/2016   • Arthralgia of hip 03/31/2016   • Renal insufficiency 03/31/2016   • O2 dependent 06/09/2016   • Pain in right knee 06/09/2016   • Arthritis of right knee 06/09/2016   • Abnormal electrocardiogram 10/17/2012   • Coronary arteriosclerosis in native artery 10/17/2012   • Chronic atrial fibrillation 09/30/2016   • Chronic coronary artery disease 09/30/2016   • Shortness of breath 10/19/2015   • Status post coronary artery balloon dilation 10/19/2015   • Hyperlipidemia 10/17/2012   • Adiposity 10/17/2012   • Right fascicular block 09/30/2016   • Right flank pain 09/30/2016   • Renal cell carcinoma of right kidney 09/30/2016   • Weight loss 09/30/2016   • History of gross hematuria    • H/O thrombocytopenia    • Pulmonary hypertension    • Left atrial thrombus 11/10/2016   • Bone metastases  11/16/2016   • Brain metastasis 11/16/2016   • Malignant neoplasm of overlapping sites of left lung 11/16/2016     Resolved Ambulatory Problems     Diagnosis Date Noted   • Abnormal laboratory test result 03/31/2016   • Abnormal weight gain 03/31/2016   • Anemia 03/31/2016   • Arthritis 03/31/2016   • Atrial fibrillation 03/31/2016   • Atrial fibrillation    • Multiple lung nodules on CT 11/02/2016   • Brain edema 11/16/2016     Past Medical History   Diagnosis Date   • Aftercare following surgery    • Benign prostatic hypertrophy    • CAD (coronary artery disease)    • Chronic kidney disease    • H/O transfusion of packed red blood cells    • H/O: pneumonia 2013   • Pulmonary embolism    • Renal insufficiency syndrome    • Transitional cell carcinoma of kidney 2013   • Urothelial carcinoma        PAST SURGICAL HISTORY  Past Surgical History   Procedure Laterality Date   • Bladder surgery     • Kidney surgery     • Nephrectomy Right 01/2013   • Cholecystectomy       35 years ago   • Cataract extraction         FAMILY HISTORY  Family History   Problem Relation Age of Onset   • Cancer Father    • Heart disease Brother        SOCIAL HISTORY  Social History     Social History   • Marital status:      Spouse name: N/A   • Number of children: N/A   • Years of education: High school     Occupational History   •  [employer]     Social History Main Topics   • Smoking status: Former Smoker     Packs/day: 1.00     Years: 15.00     Types: Cigarettes   • Smokeless tobacco: Former User   • Alcohol use Yes      Comment: Occasional   • Drug use: No   • Sexual activity: Not on file     Other Topics Concern   • Not on file     Social History Narrative       ALLERGIES  Review of patient's allergies indicates no known allergies.    REVIEW OF SYSTEMS  Review of Systems   Constitutional: Positive for chills. Negative for fever.   HENT: Negative for sore throat and trouble swallowing.    Eyes: Negative for  visual disturbance.   Respiratory: Positive for shortness of breath. Negative for cough.    Cardiovascular: Positive for palpitations (Irregular beat). Negative for chest pain and leg swelling.   Gastrointestinal: Negative for abdominal pain, diarrhea and vomiting.   Endocrine: Negative.    Genitourinary: Negative for decreased urine volume and frequency.   Musculoskeletal: Negative for neck pain.   Skin: Negative for rash.   Allergic/Immunologic: Negative.    Neurological: Negative for weakness and numbness.   Hematological: Negative.    Psychiatric/Behavioral: Negative.    All other systems reviewed and are negative.      PHYSICAL EXAM  ED Triage Vitals   Temp Pulse Resp BP SpO2   -- -- -- -- --             Temp src Heart Rate Source Patient Position BP Location FiO2 (%)   -- -- -- -- --              Physical Exam   Constitutional: He is oriented to person, place, and time and well-developed, well-nourished, and in no distress.   HENT:   Head: Normocephalic and atraumatic.   Eyes: EOM are normal. Pupils are equal, round, and reactive to light.   Neck: Normal range of motion. Neck supple.   Cardiovascular: Normal heart sounds.  An irregularly irregular rhythm present. Tachycardia present.    Pulmonary/Chest: Tachypnea noted. He is in respiratory distress (Moderate). He has rales (Bilaterally).   Abdominal: Soft. There is no tenderness. There is no rebound and no guarding.   Musculoskeletal: Normal range of motion. He exhibits no edema.   Neurological: He is alert and oriented to person, place, and time. He has normal sensation and normal strength.   Skin: Skin is warm and dry.   Psychiatric: Mood and affect normal.   Nursing note and vitals reviewed.      LAB RESULTS  Lab Results (last 24 hours)     Procedure Component Value Units Date/Time    CBC & Differential [[id number]] Collected:  03/19/17 0431    Specimen:  Blood Updated:  03/19/17 0449    Narrative:       The following orders were created for panel order CBC  & Differential.  Procedure                               Abnormality         Status                     ---------                               -----------         ------                     CBC Auto Differential[[id number]]         Abnormal            Final result                 Please view results for these tests on the individual orders.    Comprehensive Metabolic Panel [[id number]]  (Abnormal) Collected:  03/19/17 0431    Specimen:  Blood Updated:  03/19/17 0512     Glucose 189 (H) mg/dL      BUN 27 (H) mg/dL      Creatinine 1.71 (H) mg/dL      Sodium 142 mmol/L      Potassium 4.4 mmol/L      Chloride 97 (L) mmol/L      CO2 34.5 (H) mmol/L      Calcium 9.9 mg/dL      Total Protein 7.6 g/dL      Albumin 3.90 g/dL      ALT (SGPT) 24 U/L      AST (SGOT) 22 U/L      Alkaline Phosphatase 140 (H) U/L      Total Bilirubin 0.5 mg/dL      eGFR Non African Amer 39 (L) mL/min/1.73      Globulin 3.7 gm/dL      A/G Ratio 1.1 g/dL      BUN/Creatinine Ratio 15.8      Anion Gap 10.5 mmol/L     Narrative:       The MDRD GFR formula is only valid for adults with stable renal function between ages 18 and 70.    Protime-INR [[id number]]  (Abnormal) Collected:  03/19/17 0431    Specimen:  Blood Updated:  03/19/17 0458     Protime 16.0 (H) Seconds      INR 1.33 (H)     Lactic Acid, Plasma [[id number]]  (Abnormal) Collected:  03/19/17 0431    Specimen:  Blood Updated:  03/19/17 0500     Lactate 2.7 (C) mmol/L     Troponin [[id number]]  (Normal) Collected:  03/19/17 0431    Specimen:  Blood Updated:  03/19/17 0512     Troponin T <0.010 ng/mL     Narrative:       Troponin T Reference Ranges:  Less than 0.03 ng/mL:    Negative for AMI  0.03 to 0.09 ng/mL:      Indeterminant for AMI  Greater than 0.09 ng/mL: Positive for AMI    BNP [[id number]]  (Normal) Collected:  03/19/17 0431    Specimen:  Blood Updated:  03/19/17 0511     proBNP 1709.0 pg/mL     Narrative:       Among patients with dyspnea, NT-proBNP is highly sensitive for the detection of  acute congestive heart failure. In addition NT-proBNP of <300 pg/ml effectively rules out acute congestive heart failure with 99% negative predictive value.    Blood Culture [[id number]] Collected:  03/19/17 0431    Specimen:  Blood from Arm, Left Updated:  03/19/17 0443    CBC Auto Differential [[id number]]  (Abnormal) Collected:  03/19/17 0431    Specimen:  Blood Updated:  03/19/17 0449     WBC 18.75 (H) 10*3/mm3      RBC 3.86 (L) 10*6/mm3      Hemoglobin 12.1 (L) g/dL      Hematocrit 38.9 (L) %      .8 (H) fL      MCH 31.3 pg      MCHC 31.1 (L) g/dL      RDW 17.7 (H) %      RDW-SD 64.8 (H) fl      MPV 10.6 fL      Platelets 221 10*3/mm3      Neutrophil % 82.0 (H) %      Lymphocyte % 12.6 (L) %      Monocyte % 3.8 (L) %      Eosinophil % 0.2 (L) %      Basophil % 0.2 %      Immature Grans % 1.2 (H) %      Neutrophils, Absolute 15.38 (H) 10*3/mm3      Lymphocytes, Absolute 2.36 10*3/mm3      Monocytes, Absolute 0.71 10*3/mm3      Eosinophils, Absolute 0.04 10*3/mm3      Basophils, Absolute 0.03 10*3/mm3      Immature Grans, Absolute 0.23 (H) 10*3/mm3     Blood Gas, Arterial [[id number]]  (Abnormal) Collected:  03/19/17 0453    Specimen:  Arterial Blood Updated:  03/19/17 0455     Site Arterial: right radial      Dimitri's Test Positive      pH, Arterial 7.302 (L) pH units      pCO2, Arterial 70.7 (C) mm Hg       Critical:Notify Dr DR SOREN RAMIREZ (19-Mar-17 04:55:17)Read back ok        pO2, Arterial 43.6 (C) mm Hg       Critical:Notify Dr DR SOREN RAMIREZ (19-Mar-17 04:55:17)Read back ok        HCO3, Arterial 34.9 (H) mmol/L      Base Excess, Arterial 6.1 (H) mmol/L      O2 Saturation Calculated 72.0 (L) %      Barometric Pressure for Blood Gas 754.9 mmHg      Modality HFNC      Flow Rate 8 lpm      Rate 32 Breaths/minute     Narrative:       SATS 87% Meter: [id number] : [id number] Juan Waldron I ordered the above labs and reviewed the results    RADIOLOGY  XR Chest 1 View   Preliminary Result    Bilateral lung masses in keeping with known metastatic disease. No   significant change.   New opacities in the lung bases may be congestive heart failure versus   infiltrates. Please clinically correlate.               I ordered the above noted radiological studies. Interpreted by radiologist. Reviewed by me in PACS.       PROCEDURES  Critical Care  Performed by: SOREN RAMIREZ  Authorized by: SOREN RAMIREZ   Total critical care time: 35 minutes  Critical care time was exclusive of separately billable procedures and treating other patients.  Critical care was necessary to treat or prevent imminent or life-threatening deterioration of the following conditions: sepsis and respiratory failure.  Critical care was time spent personally by me on the following activities: blood draw for specimens, development of treatment plan with patient or surrogate, discussions with consultants, interpretation of cardiac output measurements, evaluation of patient's response to treatment, obtaining history from patient or surrogate, ordering and performing treatments and interventions, examination of patient, ordering and review of radiographic studies, ordering and review of laboratory studies, pulse oximetry, re-evaluation of patient's condition and review of old charts.          EKG           EKG time: 0458  Rhythm/Rate: 118 A fib and single PVC  QRS, axis: Incomplete LBBB   ST and T waves: Normal    Interpreted Contemporaneously by me, independently viewed  Unchanged compared to prior 11-10-16      PROGRESS AND CONSULTS  ED Course     0411  EKG ordered for further evaluation.    0420  CXR and labs ordered for further evaluation.    0443  I was able to view the CXR on the portable XR machine, and it showed CHF.    0459  Labs ordered for further evaluation.    0503  Bipap ordered.    0505  Zosyn ordered.    0516  Received a call from Dr. Berry and discussed pt's case. Dr. Berry agreed with plan to consult on the pt. Dr. Berry  agreed with the plan to admit the pt to the ICU.    0519  Received a call from Dr. Marks and discussed pt's case. Dr. Marks agreed with plan to admit the pt.          MEDICAL DECISION MAKING  Results were reviewed/discussed with the patient and they were also made aware of online access. Pt also made aware that some labs, such as cultures, will not be resulted during ER visit and follow up with PMD is necessary.     MDM  Number of Diagnoses or Management Options     Amount and/or Complexity of Data Reviewed  Clinical lab tests: reviewed and ordered (BNP - 1709  Troponin - Negative  Lactic Acid - 2.7)  Tests in the radiology section of CPT®: reviewed and ordered (CXR - Bilateral lung masses in keeping with known metastatic disease. No significant change.  New opacities in the lung bases may be congestive heart failure versus infiltrates. Please clinically correlate.  )  Tests in the medicine section of CPT®: ordered and reviewed (EKG           EKG time: 0458  Rhythm/Rate: 118 A fib and single PVC  QRS, axis: Incomplete LBBB   ST and T waves: Normal    Interpreted Contemporaneously by me, independently viewed  Unchanged compared to prior 11-10-16)  Discussion of test results with the performing providers: yes (Dr. Kristi Marks)  Review and summarize past medical records: yes (The pt received a CT Abd, CT Chest, and MRI Brain 2 days ago. The pt has primary lung cancer. The pt's last chemo treatment was 3-13-17.)  Discuss the patient with other providers: yes    Critical Care  Total time providing critical care: 30-74 minutes    Patient Progress  Patient progress: stable         DIAGNOSIS  Final diagnoses:   Sepsis, due to unspecified organism   Pneumonia of both lungs due to infectious organism, unspecified part of lung       DISPOSITION  ADMISSION    Discussed treatment plan and reason for admission with pt/family and admitting physician.  Pt/family voiced understanding of the plan for admission for further  testing/treatment as needed.         Latest Documented Vital Signs:  As of 5:23 AM  BP- 150/94 HR- (!) 142 Temp- 99.1 °F (37.3 °C) (Tympanic) O2 sat- (!) 86%    --  Documentation assistance provided by marycruz English for Dr. Granger.  Information recorded by the scribe was done at my direction and has been verified and validated by me.     Hugo English  03/19/17 0523       Kana Granger MD  03/19/17 0707

## 2017-03-19 NOTE — H&P
Patient Care Team:  Sergio Green MD (Inactive) as PCP - General (Family Medicine)  Sergio Green MD (Inactive) as Referring Physician (Family Medicine)  Som Garrett MD as Consulting Physician (Cardiology)  Joo Christensen Jr., MD as Consulting Physician (Urology)  Katelynn Quan MD as Consulting Physician (Radiation Oncology)  Vahid Garcia MD as Consulting Physician (Hematology and Oncology)    Chief complaint shortness of breath    Subjective     Patient is a 81 y.o. male. I'm asked to admit by Dr. Granger in the emergency room at the request of Dr. Ling oncology.patient awoke about 3 AM with shortness of breath.  He was found in the emergency room to have bilateral basilar infiltrates.  The patient has a very extensive past medical history he is on noninvasive ventilation and has difficulty talking he also has a little confusion his wife is at bedside have discussed with her and reviewed recent notes in the computer most recently a 3/13/17 note from Dr. Garcia.  The patient has a history of right renal cell carcinoma he had a nephrectomy has a history of bladder cancer and now has widely metastatic lung carcinoma with bilateral pulmonary metastases and CNS metastases and cervical spine metastases he has had radiation to his brain and cervical spine.  He has been on single agent carboplatin therapy he is due to dose tomorrow.  He also has a history of a left atrial clot and is on anticoagulation for this.  He does have a prior history of pneumonia, he also has a history of a very large esophageal diverticulum and on a CT scan done 3/17/17 this was full of particulate matter.the wife reports the patient frequently has problems swallowing or he gets choked on liquids or solids they're not aware of any choking yesterday and the patient and wife neither are aware of him having any episode of bad heartburn and reflux her finding any food or gastric material in his throat this  evening.  He was not ill when he went to bed acutely just  woke with shortness of breath.      Review of Systems  Hard to get from the patient    History  Past Medical History   Diagnosis Date   • Aftercare following surgery    • Anemia      h/o multifactorial anemia   • Atrial fibrillation    • Benign prostatic hypertrophy    • CAD (coronary artery disease)    • Chronic kidney disease      STAGE III   • Fatigue    • H/O thrombocytopenia    • H/O transfusion of packed red blood cells    • H/O: pneumonia 2013     right lower lobe   • History of gross hematuria    • Hyperlipidemia    • Hypertension    • Pulmonary embolism    • Pulmonary hypertension    • Renal insufficiency syndrome    • Transitional cell carcinoma of kidney 2013     status post nephrectomy   • Urothelial carcinoma      Of the bladder - Underwent transurethral reection w/possible residual tumor in the ureter  with concurrent chemoradiation      Past Surgical History   Procedure Laterality Date   • Bladder surgery     • Kidney surgery     • Nephrectomy Right 01/2013   • Cholecystectomy       35 years ago   • Cataract extraction       Family History   Problem Relation Age of Onset   • Cancer Father    • Heart disease Brother      Social History     Social History   • Marital status:      Spouse name: N/A   • Number of children: N/A   • Years of education: High school     Occupational History   •  [employer]     Social History Main Topics   • Smoking status: Former Smoker     Packs/day: 1.00     Years: 15.00     Types: Cigarettes   • Smokeless tobacco: Former User   • Alcohol use Yes      Comment: Occasional   • Drug use: No   • Sexual activity: Not Asked     Other Topics Concern   • None     Social History Narrative       Allergies:  Review of patient's allergies indicates no known allergies.    Medications:  Prior to Admission medications    Medication Sig Start Date End Date Taking? Authorizing Provider   apixaban  (ELIQUIS) 5 MG tablet tablet Take 1 tablet by mouth 2 (Two) Times a Day. 11/11/16   Cruz Lynn MD   Calcium-Vitamin D 600-200 MG-UNIT per tablet Take 1 tablet by mouth 2 (Two) Times a Day. 2/28/13   Historical Provider, MD   CARTIA  MG 24 hr capsule TAKE 1 CAPSULE EVERY DAY 12/15/16   Sergio Green MD   diltiazem (TIAZAC) 300 MG 24 hr capsule Take 300 mg by mouth Daily. 6/11/13   Sergio Green MD   finasteride (PROSCAR) 5 MG tablet TAKE 1 TABLET DAILY 9/19/16   Sergio Green MD   folic acid (FOLVITE) 400 MCG tablet Take 400 mcg by mouth Daily. 6/11/13   Historical Provider, MD   furosemide (LASIX) 40 MG tablet Take 1 tablet by mouth Daily. Needs appt 3/10/17   Sandip Orlando MD   HYDROcodone-homatropine (HYCODAN) 5-1.5 MG/5ML syrup Take 5 mL by mouth Every 6 (Six) Hours As Needed for cough. 2/24/17   Jose Banegas MD   Magnesium Hydroxide (MILK OF MAGNESIA PO) Take  by mouth As Needed.    Historical Provider, MD   metoprolol tartrate (LOPRESSOR) 50 MG tablet Take 50 mg by mouth 2 (two) times a day.    Historical Provider, MD   Multiple Vitamins-Minerals (MULTI COMPLETE) capsule Take 1 tablet by mouth Daily. 6/11/13   Historical Provider, MD   naproxen (NAPROSYN) 375 MG tablet Take 375 mg by mouth 3 (Three) Times a Day With Meals.    Historical Provider, MD   Polyethylene Glycol 3350 (MIRALAX PO) Take 17 Units by mouth Every Night. 11/1/16   Historical Provider, MD   potassium chloride (K-DUR,KLOR-CON) 10 MEQ CR tablet TAKE 1 TABLET EVERY DAY (SUBSTITUTED FOR  K-DUR) 2/3/17   Sergio Green MD   Probiotic capsule Take 1 capsule by mouth 2 (Two) Times a Day. 6/11/13   Historical Provider, MD   Pyridoxine HCl (VITAMIN B-6 PO) Take 100 mg by mouth Daily.    Historical Provider, MD   tamsulosin (FLOMAX) 0.4 MG capsule 24 hr capsule Take 1 capsule by mouth Daily. 3/3/17   Selam Nolasco MD   vitamin B-12 (CYANOCOBALAMIN) 1000 MCG tablet Take 1,000 mcg by mouth Daily. 2/28/13   Historical Provider, MD  "  vitamin C (ASCORBIC ACID) 500 MG tablet Take 500 mg by mouth Every Night. 2/28/13   Historical Provider, MD   vitamin E 1000 UNIT capsule Take 400 Units by mouth Daily. 2/28/13   Historical Provider, MD       apixaban 5 mg Oral BID   diltiaZEM  mg Oral Q24H   finasteride 5 mg Oral Daily   metoprolol tartrate 50 mg Oral BID   piperacillin-tazobactam 2.25 g Intravenous Q8H   polyethylene glycol 17 g Oral Daily   tamsulosin 0.4 mg Oral Daily   vancomycin 2,500 mg Intravenous Once       lactated ringers 100 mL/hr   Pharmacy to dose vancomycin        Objective     Vital Signs  Temp:  [99 °F (37.2 °C)-101.1 °F (38.4 °C)] 99 °F (37.2 °C)  Heart Rate:  [113-142] 125  Resp:  [18-32] 18  BP: (134-177)/() 134/80  Body mass index is 34.25 kg/(m^2).  No intake or output data in the 24 hours ending 03/19/17 0640       Flowsheet Rows         First Filed Value    Admission Height  75\" (190.5 cm) Documented at 03/19/2017 0413    Admission Weight  274 lb (124 kg) Documented at 03/19/2017 0413           Physical Exam:  General Appearance: well-developed elderly white male seems to be resting fairly comfortably in bed with noninvasive ventilation he is on a BiPAP with an history pressure of 16 and expiratory pressure of 8 a respiratory rate of 16 he is breathing about 25 he is pulling anywhere from 300s to 700 cc tidal volumesminute ventilation is running about 9-1/2-10 L his FiO2 is 60% with saturations running anywhere from 88-91%  Eyes: conjunctiva are clear and anicteric pupils are equal  ENT: oral mucous membranes are moist no exudates  Neck: adenopathy no thyromegaly no jugular venous distention  Lungs: even with positive pressure ventilation and he has sort of coarse crackles in both lung bases.  chest expansion is symmetric  Cardiac: irregularly irregular heart rates anywhere from 80s to about 110.  He is in atrial fibrillation on the monitor  Abdomen: soft no palpable organomegaly or masses  : not " examined  Musc/Skel: grossly normal side from some chronic arthritic changes in his knees  Skin: no jaundice no petechiae no rashes noted  Neuro: it's hard to tell with the noninvasive ventilation on he does follow commands moving all 4 extremities.he will give me some history and then his wife disagrees seems like he is mixed up a little bit and has a little bit of confusion.  I have known Mr. Mendelsbeg for a number of years and he has had some confusion particularly since the CNS metastases and brain radiationdon't think he is really much different than his baseline  Extremities/P Vascular: no clubbing cyanosis or edema palpable radial dorsalis pedis pulses  MSE: he seems to be in very good spirits      Labs:    Results from last 7 days  Lab Units 03/19/17  0431 03/13/17  0826   WBC 10*3/mm3 18.75* 7.40   HEMOGLOBIN g/dL 12.1* 11.8*   PLATELETS 10*3/mm3 221 263         Radiographic Imaging:  Imaging Results (last 24 hours)     Procedure Component Value Units Date/Time    XR Chest 1 View [[id number]] Collected:  03/19/17 0454     Updated:  03/19/17 0454    Narrative:       X-RAY CHEST 1 VIEW.     HISTORY: Shortness of breath.     COMPARISON: 11/10/2016.     FINDINGS:  Cardiomediastinal silhouette is within normal limits.         Multiple lung nodules are seen again, no significant change. Bibasilar  new opacities are noted. No definite effusion.              Impression:       Bilateral lung masses in keeping with known metastatic disease. No  significant change.  New opacities in the lung bases may be congestive heart failure versus  infiltrates. Please clinically correlate.             I have reviewed the chest x-ray and there are bilateral basilar infiltrates.  Lung masses bilaterally are identified and look similar to his recent CT I reviewed his 3/17/17 CT and is unchanged from his January 17 study basically    Assessment/Plan     Impression: #1 acute bilateral basilar pneumonia/pneumonitis.  I suspect this is  an aspiration pneumonia given the rapid onset in the bibasilar nature.  I would be worried with his with sounds like dysphagia reported by his wife and this large esophageal diverticulum that was containing a large amount of particulate matter just 2 days ago.  He is somewhat immunocompromised with his malignancies and his chemotherapy although his got a good white count response today.  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  I went ahead and continue Zosyn and add some vancomycin.  We'll check a nasal MRSA swab if that's negative we could probably discontinue the vancomycin pretty quickly.  This could all just be a Mendelson syndrome as well.  #2 widely metastatic adenocarcinoma of the lung.  Recent back pain he has had a recent CT scan 3/1717 and MRI formal reports are still pending on those studies.  Of note the wife does want him to be a full code including ventilator support.  #3 atrial fibrillation with rapid ventricular response his heart rate is up a little hopefully he can take his metoprolol and Cardizem if not will need to give those intravenously to keep his rate under control.  Also will continue his Eliquis  #4 history of left atrial clot again continue Eliquis  #5 chronic kidney disease stage III we'll have to watch his renal function and adjust medicines appropriately  #6 BPH continue Flomax and Proscar  #7 history of renal carcinoma and a right nephrectomy  #8 history of urothelial carcinoma of the bladder  #9 coronary artery disease  #10 anemia mild hemoglobin looks pretty good  #11 lactic acidosis he has a mild lactic acidosis some of this may be related to his hypoxia versus may also be sepsis we will follow serial lactic acid levels  #12 sepsis by the older criteria.  His blood pressure is very good Dr. Granger did tell me they were giving him the fluid bolus in the emergency room I don't know for sure how much he got the records  are a little unclear try and investigate.  #13 acute on chronic hypoxemic and hypercapnic respiratory failure.  I have the patient on noninvasive ventilator he was started in the emergency room I am hopeful that he will clear up quickly I think he is going to have trouble coughing and clearing secretions with this and if he does not improve quickly than he is probably going to require intubation and mechanical ventilation.  At this point the family does want this done I did discuss with them that if he is already got some dysphagia the endotracheal tube likely will worsen this discussed some of those consequences as well.  #14 chronic obstructive lung disease I do think he has a little bit of COPD as well and he has significant smoking history I will put him on some bronchodilators      Ochoa Marks MD  03/19/17  6:40 AM    Time: Have spent about 70 minutes in critical care time with the patient today

## 2017-03-19 NOTE — PLAN OF CARE
Problem: Patient Care Overview (Adult)  Goal: Plan of Care Review  Outcome: Ongoing (interventions implemented as appropriate)  Goal: Adult Individualization and Mutuality  Outcome: Ongoing (interventions implemented as appropriate)    Problem: Respiratory Insufficiency (Adult)  Goal: Identify Related Risk Factors and Signs and Symptoms  Outcome: Ongoing (interventions implemented as appropriate)  Goal: Acid/Base Balance  Outcome: Ongoing (interventions implemented as appropriate)  Goal: Effective Ventilation  Outcome: Ongoing (interventions implemented as appropriate)    Problem: Fall Risk (Adult)  Goal: Identify Related Risk Factors and Signs and Symptoms  Outcome: Ongoing (interventions implemented as appropriate)  Goal: Absence of Falls  Outcome: Ongoing (interventions implemented as appropriate)

## 2017-03-19 NOTE — ED NOTES
Pt has lung/liver/bone cancer, tonight developed HR of 260bpm and oxygen saturations 70% 3L NC per wife.    Pt direct bed to rm 14 in  per JOSIANE Jacobs Tech.     Kristina Alva RN  03/19/17 1558

## 2017-03-19 NOTE — PROGRESS NOTES
"Pharmacy to Dose Vancomycin & Zosyn IV    Day 1  Consult for Dr. Marks  Treating: Pneumonia, possible aspiration  Goal trough: 15-20 mcg/mL    IV Anti-Infectives     Ordered     Dose/Rate Route Frequency Start Stop    03/19/17 0633  piperacillin-tazobactam (ZOSYN) IVPB 2.25 g     Comments:  Pharmacy to dose for renal function please   Ordering Provider:  Ochoa Marks MD    2.25 g  over 4 Hours Intravenous Every 8 Hours 03/19/17 1400      03/19/17 0638  vancomycin 2500 mg/500 mL 0.9% NS IVPB ([facility])     Ordering Provider:  Ochoa Marks MD    2,500 mg Intravenous Once 03/19/17 0715      03/19/17 0505  piperacillin-tazobactam (ZOSYN) 4.5 g in dextrose 100 mL IVPB (premix)     Ordering Provider:  Kana Granger MD    4.5 g Intravenous Once 03/19/17 0507 03/19/17 0525      Relevant clinical data and objective history reviewed:  81 y.o. male 75\" (190.5 cm) 274 lb (124 kg)  Body mass index is 34.25 kg/(m^2).    Results from last 7 days  Lab Units 03/19/17  0431 03/13/17  0826   CREATININE mg/dL 1.71* 1.76*     Estimated Creatinine Clearance: 49.5 mL/min (by C-G formula based on Cr of 1.71).    Lab Results   Component Value Date    WBC 18.75 (H) 03/19/2017     Temp:  [99 °F (37.2 °C)-101.1 °F (38.4 °C)] 99.5 °F (37.5 °C)  Heart Rate:  [113-142] 133  Resp:  [18-32] 26  BP: (134-177)/() 134/80  No intake or output data in the 24 hours ending 03/19/17 0751    IV Meds:  lactated ringers 100 mL/hr Last Rate: 100 mL/hr (03/19/17 0657)     Ventilator/Non-Invasive Ventilation Settings     Start     Ordered    03/19/17 0638  . BIPAP; IPAP: 18; EPAP: 8; Breath Rate: 18; Titrate for spO2: equal to or greater than, 90%  Until Discontinued     Comments:  Pad mask pressure points on no please   Question Answer Comment   . BIPAP    IPAP 18    EPAP 8    Breath Rate 18    Titrate for spO2 equal to or greater than    Titrate for spO2 90%        03/19/17 0639      Baseline cultures/labs/radiology:  3/19 Blood cx: In " process  3/19 CT Chest: Multiple masses in both lungs, no significant progression of disease.  3/19 CXR: New opacities in the lung bases may be congestive heart failure versus infiltrates  3/19 MRSA screen: ordered  3/19 Lactic acid: 2.7 --> 1.3  3/19 PCT: 0.19    Assessment/Plan:   PMH: Anemia; Atrial fibrillation; BPH; CAD; CKD-III; thrombocytopenia; pneumonia (2013); hematuria; HLD; HTN; PE; Pulmonary hypertension; right renal cell carcinoma s/p nephrectomy; bladder cancer w mets to lung, cervical spine & brain s/p radiation. On Carboplatin chemotherapy; hx MRSA & VRE    CC: SOB    Will start with low dose Vancomycin 1000 mg IV q12hrs (16 mg/Kg/day) given CKD-III & right nephrectomy. Will follow with levels.     Will change Zosyn to 3.375gm IV q8hrs, extended infusion.    PK parameters:  Manuel: 0.042 hr-1,  T1/2:  16.5 hr, Vd 86.8 L  (0.7 L/kg)    Vancomycin Dosing History:  3/19 08:12 Vancomycin 2500 mg IV x1 (20 mg/Kg)    Thank you for your consult,    Jenn Almanzar [facility]  03/19/17 7:51 AM

## 2017-03-20 NOTE — PROGRESS NOTES
Acute Care - Speech Language Pathology   Swallow Initial Evaluation [facility]     Patient Name: Alexander S Mendelsberg  : 1935  MRN: [id number]  Today's Date: 3/20/2017  Onset of Illness/Injury or Date of Surgery Date: 17            Admit Date: 3/19/2017    SPEECH-LANGUAGE PATHOLOGY EVALUATION - SWALLOW  Subjective: The patient was seen on this date for a Clinical Swallow evaluation.  Patient was alert and cooperative.    The patient was admitted w/ aspiration pna.  CT of chest showed esophageal diverticulum, suspect this is impacting pna.   Objective: Textures given included thin liquid, puree consistency and mechanical soft consistency.  Assessment: Reduced hyolaryngeal excursion upon palpation.  Sandip aspiration w/ straw drinks of thin. No overt s/s of aspiration w/ thin by cup & mastication appeared WFL.  By the end of the exam, pt had belching & material backflowing into the pharynx. Pt started coughing & expectorated material. Suspect this was due to the diverticulum.   SLP Findings:  Patient presents with mild oropharyngeal dysphagia, with esophageal component.   Recommendations: Diet Textures: thin liquid, regular consistency food.  Medications should be taken whole with puree.   Recommended Strategies: Upright for PO, small bites and sips and no straw. Oral care before breakfast, after all meals and PRN.  Other Recommended Evaluations: VFSS      Visit Dx:     ICD-10-CM ICD-9-CM   1. Sepsis, due to unspecified organism A41.9 038.9     995.91   2. Pneumonia of both lungs due to infectious organism, unspecified part of lung J18.9 483.8   3. Difficulty walking R26.2 719.7     Patient Active Problem List   Diagnosis   • Anxiety   • Cough   • Chronic kidney disease, stage III (moderate)   • Difficulty breathing   • Fatigue   • Palpitations   • Hypertension   • Impaired fasting glucose   • Arthralgia of hip   • Renal insufficiency   • O2 dependent   • Pain in right knee   • Arthritis of right  knee   • Abnormal electrocardiogram   • Coronary arteriosclerosis in native artery   • Chronic atrial fibrillation   • Chronic coronary artery disease   • Shortness of breath   • Status post coronary artery balloon dilation   • Hyperlipidemia   • Adiposity   • Right fascicular block   • Right flank pain   • Renal cell carcinoma of right kidney   • Weight loss   • History of gross hematuria   • H/O thrombocytopenia   • Pulmonary hypertension   • Left atrial thrombus   • Bone metastases   • Brain metastasis   • Malignant neoplasm of overlapping sites of left lung   • Sepsis     Past Medical History   Diagnosis Date   • Aftercare following surgery    • Anemia      h/o multifactorial anemia   • Atrial fibrillation    • Benign prostatic hypertrophy    • CAD (coronary artery disease)    • Chronic kidney disease      STAGE III   • Fatigue    • H/O thrombocytopenia    • H/O transfusion of packed red blood cells    • H/O: pneumonia 2013     right lower lobe   • History of gross hematuria    • Hyperlipidemia    • Hypertension    • Pulmonary embolism    • Pulmonary hypertension    • Renal insufficiency syndrome    • Transitional cell carcinoma of kidney 2013     status post nephrectomy   • Urothelial carcinoma      Of the bladder - Underwent transurethral reection w/possible residual tumor in the ureter  with concurrent chemoradiation      Past Surgical History   Procedure Laterality Date   • Bladder surgery     • Kidney surgery     • Nephrectomy Right 01/2013   • Cholecystectomy       35 years ago   • Cataract extraction            SWALLOW EVALUATION (last 72 hours)      Swallow Evaluation       03/20/17 1524                Rehab Evaluation    Document Type evaluation  -NB        Symptoms Noted During/After Treatment none  -NB        General Information    Patient Profile Review yes  -NB        Current Diet Limitations thin liquids;regular solid  -NB        Precautions/Limitations, Vision WFL  -NB         Precautions/Limitations, Hearing hearing impairment, bilaterally  -NB        Prior Level of Function- Communication functional in all spheres  -NB        Prior Level of Function- Swallowing esophageal concerns  -NB        Plans/Goals Discussed With patient and family;agreed upon  -NB        Barriers to Rehab medically complex  -NB        Clinical Impression    Patient's Goals For Discharge patient did not state  -NB        Family Goals For Discharge family did not state  -NB        SLP Swallowing Diagnosis mild dysphagia;oral dysfunction;pharyngeal dysfunction;esophageal dysfunction  -NB        Rehab Potential/Prognosis, Swallowing good, to achieve stated therapy goals  -NB        Criteria for Skilled Therapeutic Interventions Met skilled criteria for dysphagia intervention met  -NB        Therapy Frequency PRN  -NB        Predicted Duration Therapy Interv (days) until discharge  -NB        Expected Duration Therapy Session (min) 15-30 minutes  -NB        SLP Diet Recommendation regular textures;thin liquids  -NB        Recommended Diagnostics VFSS (MBS)  -NB        Recommended Feeding/Eating Techniques no straws;small sips/bites  -NB        SLP Rec. for Method of Medication Administration meds whole in pudding/applesauce  -NB        Monitor For Signs Of Aspiration cough;gurgly voice;throat clearing  -NB        Anticipated Discharge Disposition other (see comments)   unknown  -NB        Pain Assessment    Pain Assessment No/denies pain  -NB        Cognitive Assessment/Intervention    Current Cognitive/Communication Assessment functional  -NB        Orientation Status oriented x 4  -NB        Follows Commands/Answers Questions 100% of the time  -NB        Oral Motor Structure and Function    Oral Motor Anatomy and Physiology patient demonstrates anatomy that is WNL  -NB        Dentition Assessment upper dentures/partial in place;lower dentures/partial in place  -NB        Secretion Management WNL/WFL  -NB         Mucosal Quality moist, healthy  -NB        Velar Elevation WNL (within normal limits)  -NB        Volitional Swallow no difficulties initiating volitional swallow  -NB        Volitional Cough no difficulties initiating volitional cough  -NB        Oral Musculature General Assessment WNL (within normal limits)  -NB          User Key  (r) = Recorded By, (t) = Taken By, (c) = Cosigned By    Initials Name Effective Dates    LAILA Hurtado MS [facility]-SLP 04/13/15 -         EDUCATION  The patient has been educated in the following areas:   Dysphagia (Swallowing Impairment).    SLP Recommendation and Plan  SLP Swallowing Diagnosis: mild dysphagia, oral dysfunction, pharyngeal dysfunction, esophageal dysfunction  SLP Diet Recommendation: regular textures, thin liquids  Recommended Feeding/Eating Techniques: no straws, small sips/bites  SLP Rec. for Method of Medication Administration: meds whole in pudding/applesauce  Monitor For Signs Of Aspiration: cough, gurgly voice, throat clearing  [place] Diagn[place] [place]  Criteria for Skilled Therapeutic Interventions Met: skilled criteria for dysphagia intervention met  Anticipated Discharge Disposition: other (see comments) (unknown)  Rehab Potential/Prognosis, Swallowing: good, to achieve stated therapy goals  Therapy Frequency: PRN             Plan of Care Review  Plan Of Care Reviewed With: patient, spouse  Progress: no change  Outcome Summary/Follow up Plan: BSE: Rec; Regular &thin no straws; VFSS tomorrow           SLP Goals       03/20/17 1523          Safely Consume Diet    Safely Consume Diet- SLP, Date Established 03/20/17  -NB      Safely Consume Diet- SLP, Time to Achieve by discharge  -NB        User Key  (r) = Recorded By, (t) = Taken By, (c) = Cosigned By    Initials Name Provider Type    LAILA Hurtado MS CCC-SLP Speech and Language Pathologist             SLP Outcome Measures (last 72 hours)      SLP Outcome Measures       03/20/17 1524          SLP  Outcome Measures    Outcome Measure Used? Adult NOMS  -NB      FCM Scores    FCM Chosen Swallowing  -NB      Swallowing FCM Score 6  -NB        User Key  (r) = Recorded By, (t) = Taken By, (c) = Cosigned By    Initials Name Effective Dates    LAILA Hurtado MS CCC-SLP 04/13/15 -            Time Calculation:         Time Calculation- SLP       03/20/17 1526          Time Calculation- SLP    SLP Received On 03/20/17  -NB        User Key  (r) = Recorded By, (t) = Taken By, (c) = Cosigned By    Initials Name Provider Type    LAILA Hurtado MS CCC-SLP Speech and Language Pathologist          Therapy Charges for Today     Code Description Service Date Service Provider Modifiers Qty    86377275762 HC ST EVAL ORAL PHARYNG SWALLOW 4 3/20/2017 Nivia Hurtado MS CCC-SLP GN 1               Nivia Hurtado MS CCC-DMITRIY  3/20/2017

## 2017-03-20 NOTE — PROGRESS NOTES
Acute Care - Physical Therapy Initial Evaluation  [facility]     Patient Name: Alexander S Mendelsberg  : 1935  MRN: [id number]  Today's Date: 3/20/2017   Onset of Illness/Injury or Date of Surgery Date: 17            Admit Date: 3/19/2017     Visit Dx:    ICD-10-CM ICD-9-CM   1. Sepsis, due to unspecified organism A41.9 038.9     995.91   2. Pneumonia of both lungs due to infectious organism, unspecified part of lung J18.9 483.8   3. Difficulty walking R26.2 719.7     Patient Active Problem List   Diagnosis   • Anxiety   • Cough   • Chronic kidney disease, stage III (moderate)   • Difficulty breathing   • Fatigue   • Palpitations   • Hypertension   • Impaired fasting glucose   • Arthralgia of hip   • Renal insufficiency   • O2 dependent   • Pain in right knee   • Arthritis of right knee   • Abnormal electrocardiogram   • Coronary arteriosclerosis in native artery   • Chronic atrial fibrillation   • Chronic coronary artery disease   • Shortness of breath   • Status post coronary artery balloon dilation   • Hyperlipidemia   • Adiposity   • Right fascicular block   • Right flank pain   • Renal cell carcinoma of right kidney   • Weight loss   • History of gross hematuria   • H/O thrombocytopenia   • Pulmonary hypertension   • Left atrial thrombus   • Bone metastases   • Brain metastasis   • Malignant neoplasm of overlapping sites of left lung   • Sepsis     Past Medical History   Diagnosis Date   • Aftercare following surgery    • Anemia      h/o multifactorial anemia   • Atrial fibrillation    • Benign prostatic hypertrophy    • CAD (coronary artery disease)    • Chronic kidney disease      STAGE III   • Fatigue    • H/O thrombocytopenia    • H/O transfusion of packed red blood cells    • H/O: pneumonia 2013     right lower lobe   • History of gross hematuria    • Hyperlipidemia    • Hypertension    • Pulmonary embolism    • Pulmonary hypertension    • Renal insufficiency syndrome    • Transitional  cell carcinoma of kidney 2013     status post nephrectomy   • Urothelial carcinoma      Of the bladder - Underwent transurethral reection w/possible residual tumor in the ureter  with concurrent chemoradiation      Past Surgical History   Procedure Laterality Date   • Bladder surgery     • Kidney surgery     • Nephrectomy Right 01/2013   • Cholecystectomy       35 years ago   • Cataract extraction            PT ASSESSMENT (last 72 hours)      PT Evaluation       03/20/17 1354 03/19/17 0613    Rehab Evaluation    Document Type evaluation  -     Subjective Information agree to therapy  -     Patient Effort, Rehab Treatment good  -     Symptoms Noted During/After Treatment none  -     General Information    Onset of Illness/Injury or Date of Surgery Date 03/19/17  -     General Observations supine in bed, no acute distress noted at rest, family present  -     Pertinent History Of Current Problem pt admitted with SOA and palpitations, found to have PNA and sepsis, h/o lung/liver/bone cancer  -     Precautions/Limitations fall precautions;oxygen therapy device and L/min  -     Prior Level of Function independent:;gait;transfer;bed mobility;ADL's  -     Equipment Currently Used at Home none  -     Plans/Goals Discussed With patient and family  [facility]     Barriers to Rehab medically complex  -     Living Environment    Lives With  spouse  -    Living Arrangements  house  -    Home Accessibility  no concerns  -    Stair Railings at Home  none  -    Type of Financial/Environmental Concern  none  -    Transportation Available  car;family or friend will provide  -    Clinical Impression    Patient/Family Goals Statement to return to [facility]  -     Criteria for Skilled Therapeutic Interventions Met treatment indicated  -     Impairments Found (describe specific impairments) gait, locomotion, and balance;muscle performance  -     Rehab Potential good, to achieve stated therapy goals  [facility]      Pain Assessment    Pain Assessment No/denies pain  -     Cognitive Assessment/Intervention    Current Cognitive/Communication Assessment functional  -     Orientation Status oriented x 4  -     Follows Commands/Answers Questions 100% of the time  -     Personal Safety WNL/WFL  -     Personal Safety Interventions fall prevention program maintained;gait belt;nonskid shoes/slippers when out of bed  -     ROM (Range of Motion)    General ROM no range of motion deficits identified  -     MMT (Manual Muscle Testing)    General MMT Assessment other (see comments)   some generalized weakness noted with functional mobility  -     Bed Mobility, Assessment/Treatment    Bed Mob, Supine to Sit, [place] verbal cues required;nonverbal cues required (demo/gesture);minimum assist (75% patient effort);2 person assist required  -     Bed Mob, Sit to Supine, [place] verbal cues required;nonverbal cues required (demo/gesture);contact guard assist;2 person assist required  -     Transfer Assessment/Treatment    Transfers, Sit-Stand [place] verbal cues required;nonverbal cues required (demo/gesture);contact guard assist;2 person assist required;hand held assist  -     Transfers, Stand-Sit [place] verbal cues required;nonverbal cues required (demo/gesture);contact guard assist;2 person assist required  -     Gait Assessment/Treatment    Gait, [place] Level verbal cues required;nonverbal cues required (demo/gesture);contact guard assist;2 person assist required  -     Gait, Distance (Feet) 40  -     Gait, Gait Deviations claudio decreased;step length decreased;stride length decreased  -     Motor Skills/Interventions    Additional Documentation Balance Skills Training (Group)  -     Balance Skills Training    Standing-Level of Assistance Contact guard;x2  -     Gait Balance-Level of Assistance Contact guard;x2  -CH     Positioning and Restraints    Pre-Treatment Position in bed  -      Post Treatment Position bed  -CH     In Bed supine;call light within reach;encouraged to call for assist;with family/caregiver  -       User Key  (r) = Recorded By, (t) = Taken By, (c) = Cosigned By    Initials Name Provider Type     Ryann Muir PT Physical Therapist    CYNDEE Butler, RN Registered Nurse          Physical Therapy Education     Title: PT OT SLP Therapies (Active)     Topic: Physical Therapy (Active)     Point: Mobility training (Done)    Learning Progress Summary    Learner Readiness Method Response Comment Documented by Status   Patient Acceptance E,TB,D VU,NR   03/20/17 1512 Done               Point: Body mechanics (Done)    Learning Progress Summary    Learner Readiness Method Response Comment Documented by Status   Patient Acceptance E,TB,D VU,NR   03/20/17 1512 Done               Point: Precautions (Done)    Learning Progress Summary    Learner Readiness Method Response Comment Documented by Status   Patient Acceptance E,TB,D VU,NR   03/20/17 1512 Done                      User Key     Initials Effective Dates Name Provider Type [facility] 12/01/15 -  Ryann Muir PT Physical Therapist PT                PT Recommendation and Plan  Anticipated Discharge Disposition: home with home health  Planned Therapy Interventions: balance training, bed mobility training, home exercise program, gait training, patient/family education, transfer training  PT Frequency: daily  Plan of Care Review  Plan Of Care Reviewed With: patient  Outcome Summary/Follow up Plan: Pt presents with impaired functional mobility and gait secondary to generalized weakness and decreased activity tolerance post respiratory failure. Pt may benefit from skilled PT to address these deficits.          IP PT Goals       03/20/17 1513          Bed Mobility PT LTG    Bed Mobility PT LTG, Time to Achieve 1 wk  -CH      Bed Mobility PT LTG, Activity Type all bed mobility  -CH      Bed Mobility PT LTG,  [place] Level supervision required  -CH      Transfer Training PT LTG    Transfer Training PT LTG, Time to Achieve 1 wk  -CH      Transfer Training PT LTG, Activity Type all transfers  -CH      Transfer Training PT LTG, [place] Level supervision required  -CH      Gait Training PT LTG    Gait Training Goal PT LTG, Time to Achieve 1 wk  -CH      Gait Training Goal PT LTG, [place] Level supervision required  -CH      Gait Training Goal PT LTG, Distance to Achieve 200  -CH        User Key  (r) = Recorded By, (t) = Taken By, (c) = Cosigned By    Initials Name Provider Type    HUANG Muir PT Physical Therapist                Outcome Measures       03/20/17 1500          How much help from another person do you currently need...    Turning from your back to your side while in flat bed without using bedrails? 3  -CH      Moving from lying on back to sitting on the side of a flat bed without bedrails? 3  -CH      Moving to and from a bed to a chair (including a wheelchair)? 3  -CH      Standing up from a chair using your arms (e.g., wheelchair, bedside chair)? 3  -CH      Climbing 3-5 steps with a railing? 2  -CH      To walk in hospital room? 3  -CH      AM-PAC 6 Clicks Score 17  -CH      Functional Assessment    Outcome Measure Options AM-PAC 6 Clicks Basic Mobility (PT)  -        User Key  (r) = Recorded By, (t) = Taken By, (c) = Cosigned By    Initials Name Provider Type    HUANG Muir PT Physical Therapist           Time Calculation:         PT Charges       03/20/17 1515          Time Calculation    Start Time 1338  -      Stop Time 1354  -      Time Calculation (min) 16 min  -      PT Received On 03/20/17  -      PT - Next Appointment 03/21/17  -      PT Goal Re-Cert Due Date 03/27/17  -        User Key  (r) = Recorded By, (t) = Taken By, (c) = Cosigned By    Initials Name Provider Type    HUANG Muir PT Physical Therapist          Therapy Charges for Today      Code Description Service Date Service Provider Modifiers Qty    17561585185 HC PT EVAL MOD COMPLEXITY 2 3/20/2017 Ryann Muir, PT GP 1    35933395286 HC PT THER PROC EA 15 MIN 3/20/2017 Ryann Muir, PT GP 1    14241008615 HC PT THER SUPP EA 15 MIN 3/20/2017 Ryann Muir, PT GP 1          PT G-Codes  Outcome Measure Options: AM-PAC 6 Clicks Basic Mobility (PT)      Ryann Muir, PT  3/20/2017

## 2017-03-20 NOTE — PLAN OF CARE
Problem: Patient Care Overview (Adult)  Goal: Plan of Care Review  Outcome: Ongoing (interventions implemented as appropriate)    03/20/17 0631   Coping/Psychosocial Response Interventions   Plan Of Care Reviewed With patient   Patient Care Overview   Progress no change   Outcome Evaluation   Outcome Summary/Follow up Plan pt tolerated 10L hi ginger. BiPAP applied at midnight. pt continues to cough up tannish yellow sputum. Afebrile. Controlled Afib on the monitor.

## 2017-03-20 NOTE — PROGRESS NOTES
RADIATION THERAPY TREATMENT SUMMARY    DIAGNOSIS:   Malignant neoplasm of overlapping sites of left lung - treating a posterior left chest wall mass which was symptomatic; previously treated brain metastasis          Clinical Stage IV (T2, NX, M1b)     Patient Care Team:  Sergio Green MD (Inactive) as PCP - General (Family Medicine)  Sergio Green MD (Inactive) as Referring Physician (Family Medicine)  Som Garrett MD as Consulting Physician (Cardiology)  Joo Christensen Jr., MD as Consulting Physician (Urology)  Katelynn Quan MD as Consulting Physician (Radiation Oncology)  Vahid Garcia MD as Consulting Physician (Hematology and Oncology)    The patient is a 81 y.o. year old male who has recently completed radiation therapy treatment for the above mentioned diagnosis.     Please see the specifics of the course of treatment below.    Treatment Site - Left Posterior Chest Wall  Treatment Intent - Palliative  Total Dose in cGy - 3250  Number of Treatments - 13  Dose per fraction - 250 cGy per fraction  Fractions per day - 1 fx/day  Fractions per week - 5 fx/week  Treatment Type - 3D  Energy - 6 MVP  Normalization - Isocenter, See below  Imaging/Field Verification - Simulation before first treatment to verify field, blocking, placement and positioning, Simulation for all ports of change, Weekly port films of all ports, IGRT using CBCT daily    Tolerance and Toxicities:  he tolerated the treatments well, with no problems and improvement in the pain in the treated region.he required no treatment breaks and the above course of treatments was completed in 16 elapsed days    F/U plans: The patient has continued follow up set with the [facility] physicians with plans to continue systemic treatment as counts allow . Therefore, I have not given the patient an appointment to return here but encouraged them to call if I could be of further help.

## 2017-03-20 NOTE — PLAN OF CARE
Problem: Patient Care Overview (Adult)  Goal: Plan of Care Review    03/20/17 1523   Coping/Psychosocial Response Interventions   Plan Of Care Reviewed With patient;spouse   Patient Care Overview   Progress no change   Outcome Evaluation   Outcome Summary/Follow up Plan BSE: Rec; Regular &thin no straws; VFSS tomorrow         Problem: Inpatient SLP  Goal: Dysphagia- Patient will safely consume diet as per recommendation with no signs/symptoms of aspiration    03/20/17 1523   Safely Consume Diet   Safely Consume Diet- SLP, Date Established 03/20/17   Safely Consume Diet- SLP, Time to Achieve by discharge

## 2017-03-20 NOTE — PROGRESS NOTES
[facility] INPATIENT PROGRESS NOTE         [facility] INTENSIVE CARE    3/20/2017      PATIENT IDENTIFICATION:   Name:  Alexander S Mendelsberg      MRN:  [id number]     81 y.o.  male             LOS 1    Reason for visit: Follow-up acute hypoxemic respiratory failure with bilateral pneumonia/pneumonitis and lung cancer      SUBJECTIVE:    Off BiPAP on 10 L high flow supplemental oxygen.  Denies chest pain.  Mild nonproductive cough.    Objective   OBJECTIVE:    Vital Sign Min/Max for last 24 hours  Temp  Min: 98 °F (36.7 °C)  Max: 99.3 °F (37.4 °C)   BP  Min: 96/66  Max: 147/87   Pulse  Min: 81  Max: 122   Resp  Min: 16  Max: 20   SpO2  Min: 80 %  Max: 100 %   Flow (L/min)  Min: 9  Max: 12   No Data Recorded                         Body mass index is 34.25 kg/(m^2).    Intake/Output Summary (Last 24 hours) at 03/20/17 0924  Last data filed at 03/20/17 0500   Gross per 24 hour   Intake 3032.6 ml   Output    950 ml   Net 2082.6 ml         Exam:  GEN:  No distress, appears stated age  EYES:   PERRL, anicteric sclera  ENT:    External ears/nose normal, OP clear  NECK:  No adenopathy, midline trachea  LUNGS: Normal chest on inspection, palpation and diminished bilaterally on auscultation  CV:  Normal S1S2, without murmur  ABD:  Non tender, non distended, no hepatosplenomegaly, +BS  EXT:  No edema, cyanosis or clubbing    Scheduled meds:    apixaban 5 mg Oral BID   diltiaZEM  mg Oral Q24H   finasteride 5 mg Oral Daily   ipratropium-albuterol 3 mL Nebulization Q4H - RT   metoprolol tartrate 50 mg Oral BID   piperacillin-tazobactam 3.375 g Intravenous Q8H   polyethylene glycol 17 g Oral Daily   tamsulosin 0.4 mg Oral Daily   vancomycin 1,000 mg Intravenous Q12H     IV meds:                        lactated ringers 100 mL/hr Last Rate: 100 mL/hr (03/20/17 8843)   Pharmacy to dose vancomycin     Pharmacy to Dose Zosyn       Data Review:    Results from last 7 days  Lab Units 03/20/17  0518 03/19/17  9712   03/17/17  1423 03/17/17  1420   SODIUM mmol/L 141 142  --   --   --    POTASSIUM mmol/L 4.7 4.4  --   --   --    CHLORIDE mmol/L 99 97*  --   --   --    TOTAL CO2 mmol/L 31.0* 34.5*  --   --   --    BUN mg/dL 23 27*  --   --   --    CREATININE mg/dL 1.49* 1.71*  --  1.80* 1.80*   GLUCOSE mg/dL 106* 189*  < >  --   --    CALCIUM mg/dL 8.9 9.9  --   --   --    < > = values in this interval not displayed.      Estimated Creatinine Clearance: 56.9 mL/min (by C-G formula based on Cr of 1.49).    Results from last 7 days  Lab Units 03/20/17  0518 03/19/17  0431   WBC 10*3/mm3 22.66* 18.75*   HEMOGLOBIN g/dL 10.7* 12.1*   PLATELETS 10*3/mm3 176 221       Results from last 7 days  Lab Units 03/19/17  0431   INR  1.33*       Results from last 7 days  Lab Units 03/19/17  0431   ALT (SGPT) U/L 24   AST (SGOT) U/L 22       Results from last 7 days  Lab Units 03/20/17  0412   PH, ARTERIAL pH units 7.398   PO2 ART mm Hg 88.9   PCO2, ARTERIAL mm Hg 59.7*   HCO3 ART mmol/L 36.8*     Microbiology reviewed            Assessment   ASSESSMENT:   Acute hypoxemic respiratory failure  Acute bilateral pneumonia/pneumonitis with aspiration  Dysphagia  Metastatic adenocarcinoma the lung  Atrial fibrillation with RVR  History left atrial clot  Chronic kidney disease III  BPH  History of renal cell carcinoma and right nephrectomy  History of bladder cancer  Coronary artery disease  Lactic acidosis: Improved  Sepsis: Improved      PLAN:  Weaning oxygen as able.  Encourage good pulmonary toilet.  Continue antibiotics and narrow based on culture results.  On anticoagulation for atrial fibrillation and left atrial clot history  If continues to improve may be able to transfer out of the intensive care unit later today.    Kana Ball MD  Pulmonary and Critical Care Medicine  [facility], [facility]  3/20/2017    9:24 AM

## 2017-03-20 NOTE — PLAN OF CARE
Problem: Patient Care Overview (Adult)  Goal: Plan of Care Review  Outcome: Ongoing (interventions implemented as appropriate)    03/20/17 1919   Coping/Psychosocial Response Interventions   Plan Of Care Reviewed With patient;spouse   Patient Care Overview   Progress improving   Outcome Evaluation   Outcome Summary/Follow up Plan VSS, Regular diet and thin no straws. Video Swallow tomorrow around 1000. Sat up in chair for each meal, OF to tele whenever bed available. BM x1 today.         Problem: Respiratory Insufficiency (Adult)  Goal: Identify Related Risk Factors and Signs and Symptoms  Outcome: Ongoing (interventions implemented as appropriate)  Goal: Acid/Base Balance  Outcome: Ongoing (interventions implemented as appropriate)  Goal: Effective Ventilation  Outcome: Ongoing (interventions implemented as appropriate)    Problem: Fall Risk (Adult)  Goal: Identify Related Risk Factors and Signs and Symptoms  Outcome: Ongoing (interventions implemented as appropriate)  Goal: Absence of Falls  Outcome: Ongoing (interventions implemented as appropriate)

## 2017-03-21 NOTE — PROGRESS NOTES
Acute Care - Speech Language Pathology   Swallow Initial Evaluation [facility]     Patient Name: Alexander S Mendelsberg  : 1935  MRN: [id number]  Today's Date: 3/21/2017  Onset of Illness/Injury or Date of Surgery Date: 17            Admit Date: 3/19/2017  SPEECH-LANGUAGE PATHOLOGY EVALUTION - VFSS  Subjective: The patient was seen on this date for a VFSS(Videofluoroscopic Swallowing Study).  Patient was alert and cooperative.    Objective: Risks/benefits were reviewed with the patient, and consent was obtained. The study was completed with SLP present and Radiologist review. The patient was seen in lateral view(s). Textures given included thin liquid, puree consistency, mechanical soft consistency and regular consistency.  Assessment: Minimal oropharyngeal dysphagia w/ no evidence of penetration or aspiration w/ any PO intake.  Mild pharyngeal residue noted w/ all except regular textures; moderate residue remained.  Pt did clear all residue w/a liquid wash.  Pt did have sluggish epiglottic deflection & slightly reduced peristalsis.  Small osteophytes noted, but did not impede the swallow.  Reviewed w/ Radiologist & no obvious diverticulum was not based on the views of the VFSS, however noted poor esophageal motility & backflow.   Recommendations: Diet Textures: thin liquid, regular consistency food. Medications should be taken whole with thin liquids.   Recommended Strategies: Reflux precautions, Upright for PO and small bites and sips. Oral care before breakfast, after all meals and PRN.  Other Recommended Evaluations: Referral to GI to further assess issues w/ backflow & possible aspriation of this material.     Dysphagia therapy is not recommended. Rationale: swallow is functional.        Visit Dx:     ICD-10-CM ICD-9-CM   1. Sepsis, due to unspecified organism A41.9 038.9     995.91   2. Pneumonia of both lungs due to infectious organism, unspecified part of lung J18.9 483.8   3. Difficulty  walking R26.2 719.7     Patient Active Problem List   Diagnosis   • Anxiety   • Cough   • Chronic kidney disease, stage III (moderate)   • Difficulty breathing   • Fatigue   • Palpitations   • Hypertension   • Impaired fasting glucose   • Arthralgia of hip   • Renal insufficiency   • O2 dependent   • Pain in right knee   • Arthritis of right knee   • Abnormal electrocardiogram   • Coronary arteriosclerosis in native artery   • Chronic atrial fibrillation   • Chronic coronary artery disease   • Shortness of breath   • Status post coronary artery balloon dilation   • Hyperlipidemia   • Adiposity   • Right fascicular block   • Right flank pain   • Renal cell carcinoma of right kidney   • Weight loss   • History of gross hematuria   • H/O thrombocytopenia   • Pulmonary hypertension   • Left atrial thrombus   • Bone metastases   • Brain metastasis   • Malignant neoplasm of overlapping sites of left lung   • Sepsis     Past Medical History   Diagnosis Date   • Aftercare following surgery    • Anemia      h/o multifactorial anemia   • Atrial fibrillation    • Benign prostatic hypertrophy    • CAD (coronary artery disease)    • Chronic kidney disease      STAGE III   • Fatigue    • H/O thrombocytopenia    • H/O transfusion of packed red blood cells    • H/O: pneumonia 2013     right lower lobe   • History of gross hematuria    • Hyperlipidemia    • Hypertension    • Pulmonary embolism    • Pulmonary hypertension    • Renal insufficiency syndrome    • Transitional cell carcinoma of kidney 2013     status post nephrectomy   • Urothelial carcinoma      Of the bladder - Underwent transurethral reection w/possible residual tumor in the ureter  with concurrent chemoradiation      Past Surgical History   Procedure Laterality Date   • Bladder surgery     • Kidney surgery     • Nephrectomy Right 01/2013   • Cholecystectomy       35 years ago   • Cataract extraction            SWALLOW EVALUATION (last 72 hours)      Swallow  Evaluation       03/21/17 1148 03/20/17 1524             Rehab Evaluation    Document Type evaluation  -NB evaluation  -NB       Symptoms Noted During/After Treatment none  -NB none  -NB       General Information    Patient Profile Review yes  -NB yes  -NB       Current Diet Limitations thin liquids;regular solid  -NB thin liquids;regular solid  -NB       Precautions/Limitations, Vision  WFL  -NB       Precautions/Limitations, Hearing  hearing impairment, bilaterally  -NB       Prior Level of Function- Communication  functional in all spheres  -NB       Prior Level of Function- Swallowing  esophageal concerns  -NB       Plans/Goals Discussed With patient;agreed upon  -NB patient and family;agreed upon  -NB       Barriers to Rehab medically complex  -NB medically complex  -NB       Clinical Impression    Patient's Goals For Discharge patient did not state  -NB patient did not state  -NB       Family Goals For Discharge  family did not state  -NB       SLP Swallowing Diagnosis other (see comments);esophageal dysfunction   WFL  -NB mild dysphagia;oral dysfunction;pharyngeal dysfunction;esophageal dysfunction  -NB       Rehab Potential/Prognosis, Swallowing  good, to achieve stated therapy goals  -NB       Criteria for Skilled Therapeutic Interventions Met no problems identified which require skilled intervention  -NB skilled criteria for dysphagia intervention met  -NB       Therapy Frequency evaluation only  -NB PRN  -NB       Predicted Duration Therapy Interv (days)  until discharge  -NB       Expected Duration Therapy Session (min)  15-30 minutes  -NB       SLP Diet Recommendation regular textures;thin liquids  -NB regular textures;thin liquids  -NB       Recommended Diagnostics  VFSS (MBS)  -NB       Recommended Feeding/Eating Techniques small sips/bites  -NB no straws;small sips/bites  -NB       SLP Rec. for Method of Medication Administration meds whole with thin liquid;meds whole in pudding/applesauce  -NB meds  whole in pudding/applesauce  -NB       Monitor For Signs Of Aspiration cough;throat clearing;gurgly voice  -NB cough;gurgly voice;throat clearing  -NB       Anticipated Discharge Disposition other (see comments)   unknown  -NB other (see comments)   unknown  -NB       Pain Assessment    Pain Assessment No/denies pain  -NB No/denies pain  -NB       Cognitive Assessment/Intervention    Current Cognitive/Communication Assessment functional  -NB functional  -NB       Orientation Status  oriented x 4  -NB       Follows Commands/Answers Questions  100% of the time  -NB       Oral Motor Structure and Function    Oral Motor Anatomy and Physiology patient demonstrates anatomy that is WNL  -NB patient demonstrates anatomy that is WNL  -NB       Dentition Assessment  upper dentures/partial in place;lower dentures/partial in place  -NB       Secretion Management  WNL/WFL  -NB       Mucosal Quality  moist, healthy  -NB       Velar Elevation  WNL (within normal limits)  -NB       Volitional Swallow  no difficulties initiating volitional swallow  -NB       Volitional Cough  no difficulties initiating volitional cough  -NB       Oral Musculature General Assessment  WNL (within normal limits)  -NB       SLP Communication to Radiology    Summary Statement Minimal oropharyngeal dysphagia w/ no evidence of penetration or aspiration. Small osteophytes noted, but did not impede the swallow.  Reviewed w/ Radiologist & no obvious diverticulum was not based on the views of the VFSS, however noted poor motility & backflow.   -NB          User Key  (r) = Recorded By, (t) = Taken By, (c) = Cosigned By    Initials Name Effective Dates    LAILA Hurtado MS [facility]SLP 04/13/15 -         EDUCATION  The patient has been educated in the following areas:   Dysphagia (Swallowing Impairment).    SLP Recommendation and Plan  SLP Swallowing Diagnosis: other (see comments), esophageal dysfunction (WFL)-minimal oropharyngeal   SLP Diet Recommendation:  regular textures, thin liquids  Recommended Feeding/Eating Techniques: small sips/bites  SLP Rec. for Method of Medication Administration: meds whole with thin liquid, meds whole in pudding/applesauce  Monitor For Signs Of Aspiration: cough, throat clearing, gurgly voice  Recommended Diagnostics: VFSS ([facility])  Criteria for Skilled Therapeutic Interventions Met: no problems identified which require skilled intervention  Anticipated Discharge Disposition: other (see comments) (unknown)  Rehab Potential/Prognosis, Swallowing: good, to achieve stated therapy goals  Therapy Frequency: evaluation only             Plan of Care Review  Plan Of Care Reviewed With: patient  Progress: improving  Outcome Summary/Follow up Plan: VFSS: Functional swallow w/ esopahgeal component.  Rec: Regular & thin; GI consult          IP SLP Goals       03/21/17 1146 03/20/17 1523       Safely Consume Diet    Safely Consume Diet- SLP, Date Established  03/20/17  -NB     Safely Consume Diet- SLP, Time to Achieve  by discharge  -NB     Safely Consume Diet- SLP, Additional Goal regular & thin  -NB      Safely Consume Diet- SLP, Date Goal Reviewed 03/21/17  -NB      Safely Consume Diet- SLP, Outcome goal met  -NB        User Key  (r) = Recorded By, (t) = Taken By, (c) = Cosigned By    Initials Name Provider Type    LAILA Hurtado MS CCC-SLP Speech and Language Pathologist             SLP Outcome Measures (last 72 hours)      SLP Outcome Measures       03/21/17 1148 03/20/17 1524       SLP Outcome Measures    Outcome Measure Used? Adult NOMS  -NB Adult NOMS  -NB     FCM Scores    FCM Chosen Swallowing  -NB Swallowing  -NB     Swallowing FCM Score 7  -NB 6  -NB       User Key  (r) = Recorded By, (t) = Taken By, (c) = Cosigned By    Initials Name Effective Dates    LAILA Hurtado MS CCC-SLP 04/13/15 -            Time Calculation:         Time Calculation- SLP       03/21/17 1153          Time Calculation- SLP    SLP Received On 03/21/17  -NB         User Key  (r) = Recorded By, (t) = Taken By, (c) = Cosigned By    Initials Name Provider Type    LAILA Hurtado MS CCC-SLP Speech and Language Pathologist          Therapy Charges for Today     Code Description Service Date Service Provider Modifiers Qty    33376436343 HC ST EVAL ORAL PHARYNG SWALLOW 4 3/20/2017 Nivia Hurtado MS CCC-SLP GN 1    24871869301 HC ST MOTION FLUORO EVAL SWALLOW 5 3/21/2017 iNvia Hurtado MS CCC-SLP GN 1               MS PIERRE Tamez  3/21/2017

## 2017-03-21 NOTE — PROGRESS NOTES
Acute Care - Physical Therapy Treatment Note  [facility]     Patient Name: Alexander S Mendelsberg  : 1935  MRN: [id number]  Today's Date: 3/21/2017  Onset of Illness/Injury or Date of Surgery Date: 17          Admit Date: 3/19/2017    Visit Dx:    ICD-10-CM ICD-9-CM   1. Sepsis, due to unspecified organism A41.9 038.9     995.91   2. Pneumonia of both lungs due to infectious organism, unspecified part of lung J18.9 483.8   3. Difficulty walking R26.2 719.7     Patient Active Problem List   Diagnosis   • Anxiety   • Cough   • Chronic kidney disease, stage III (moderate)   • Difficulty breathing   • Fatigue   • Palpitations   • Hypertension   • Impaired fasting glucose   • Arthralgia of hip   • Renal insufficiency   • O2 dependent   • Pain in right knee   • Arthritis of right knee   • Abnormal electrocardiogram   • Coronary arteriosclerosis in native artery   • Chronic atrial fibrillation   • Chronic coronary artery disease   • Shortness of breath   • Status post coronary artery balloon dilation   • Hyperlipidemia   • Adiposity   • Right fascicular block   • Right flank pain   • Renal cell carcinoma of right kidney   • Weight loss   • History of gross hematuria   • H/O thrombocytopenia   • Pulmonary hypertension   • Left atrial thrombus   • Bone metastases   • Brain metastasis   • Malignant neoplasm of overlapping sites of left lung   • Sepsis               Adult Rehabilitation Note       17 8450          Rehab Assessment/Intervention    Discipline physical therapist  -      Document Type therapy note (daily note)  -CH      Subjective Information agree to therapy  -CH      Patient Effort, Rehab Treatment good  -CH      Symptoms Noted During/After Treatment none  -CH      Precautions/Limitations fall precautions  -CH      Recorded by [CH] Ryann Muir, PT      Pain Assessment    Pain Assessment No/denies pain  -CH      Recorded by [CH] Ryann Muir, PT      Cognitive  Assessment/Intervention    Current Cognitive/Communication Assessment functional  -      Orientation Status oriented x 4  -      Follows Commands/Answers Questions 100% of the time  -      Personal Safety WNL/WFL  -      Personal Safety Interventions fall prevention program maintained;gait belt;nonskid shoes/slippers when out of bed  -      Recorded by [CH] Ryann Muir PT      Bed Mobility, Assessment/Treatment    Bed Mob, Supine to Sit, [place] not tested  -      Bed Mob, Sit to Supine, [place] not tested  -      Bed Mobility, Comment sitting in chair  -CH      Recorded by [CH] Ryann Muir PT      Transfer Assessment/Treatment    Transfers, Sit-Stand [place] verbal cues required;nonverbal cues required (demo/gesture);contact guard assist;2 person assist required;hand held assist  -      Transfers, Stand-Sit [place] verbal cues required;nonverbal cues required (demo/gesture);contact guard assist;2 person assist required;hand held assist  -      Recorded by [CH] Ryann Muir PT      Gait Assessment/Treatment    Gait, [place] Level verbal cues required;nonverbal cues required (demo/gesture);contact guard assist;1 person + 1 person to manage equipment  -      Gait, Distance (Feet) 150  -      Gait, Gait Deviations claudio decreased;step length decreased;stride length decreased  -      Gait, Safety Issues supplemental O2  -      Gait, Comment some SOA noted at end of ambulation but O2 sats in low 90s%  -      Recorded by [CH] Ryann Muir PT      Positioning and Restraints    Pre-Treatment Position sitting in chair/recliner  -      Post Treatment Position chair  -      In Chair sitting;call light within reach;encouraged to call for assist;with family/caregiver  -      Recorded by [CH] Ryann Muir PT        User Key  (r) = Recorded By, (t) = Taken By, (c) = Cosigned By    Initials Name Effective Dates     Ryann Muir PT  12/01/15 -                 IP PT Goals       03/20/17 1513          Bed Mobility PT LTG    Bed Mobility PT LTG, Time to Achieve 1 wk  -CH      Bed Mobility PT LTG, Activity Type all bed mobility  -CH      Bed Mobility PT LTG, [place] Level supervision required  -CH      Transfer Training PT LTG    Transfer Training PT LTG, Time to Achieve 1 wk  -CH      Transfer Training PT LTG, Activity Type all transfers  -CH      Transfer Training PT LTG, [place] Level supervision required  -CH      Gait Training PT LTG    Gait Training Goal PT LTG, Time to Achieve 1 wk  -CH      Gait Training Goal PT LTG, [place] Level supervision required  -CH      Gait Training Goal PT LTG, Distance to Achieve 200  -CH        User Key  (r) = Recorded By, (t) = Taken By, (c) = Cosigned By    Initials Name Provider Type     Ryann Muir PT Physical Therapist          Physical Therapy Education     Title: PT OT SLP Therapies (Active)     Topic: Physical Therapy (Active)     Point: Mobility training (Done)    Learning Progress Summary    Learner Readiness Method Response Comment Documented by Status   Patient Acceptance E,D,TB VU,NR   03/21/17 1602 Done    Acceptance E,TB,D VU,NR   03/20/17 1512 Done               Point: Body mechanics (Done)    Learning Progress Summary    Learner Readiness Method Response Comment Documented by Status   Patient Acceptance E,D,TB VU,NR   03/21/17 1602 Done    Acceptance E,TB,D VU,NR   03/20/17 1512 Done               Point: Precautions (Done)    Learning Progress Summary    Learner Readiness Method Response Comment Documented by Status   Patient Acceptance E,D,TB VU,NR   03/21/17 1602 Done    Acceptance E,TB,D VU,NR   03/20/17 1512 Done                      User Key     Initials Effective Dates Name Provider Type [facility] 12/01/15 -  Ryann Muir, PT Physical Therapist PT                    PT Recommendation and Plan  Anticipated Discharge Disposition: home with home  health  Planned Therapy Interventions: balance training, bed mobility training, home exercise program, gait training, patient/family education, transfer training  PT Frequency: daily  Plan of Care Review  Plan Of Care Reviewed With: patient  Progress: improving  Outcome Summary/Follow up Plan: Pt demonstrates increased activity tolerance and functional strength as he was able to increase his gait distance and required less assistance. Pt may benefit from continued PT services to address strength, mobility, and activity.          Outcome Measures       03/21/17 1600 03/20/17 1500       How much help from another person do you currently need...    Turning from your back to your side while in flat bed without using bedrails? 3  -CH 3  -CH     Moving from lying on back to sitting on the side of a flat bed without bedrails? 3  -CH 3  -CH     Moving to and from a bed to a chair (including a wheelchair)? 3  -CH 3  -CH     Standing up from a chair using your arms (e.g., wheelchair, bedside chair)? 3  -CH 3  -CH     Climbing 3-5 steps with a railing? 2  -CH 2  -CH     To walk in hospital room? 3  -CH 3  -CH     AM-PAC 6 Clicks Score 17  -CH 17  -CH     Functional Assessment    Outcome Measure Options AM-PAC 6 Clicks Basic Mobility (PT)  -CH AM-PAC 6 Clicks Basic Mobility (PT)  -       User Key  (r) = Recorded By, (t) = Taken By, (c) = Cosigned By    Initials Name Provider Type    HUANG Muir PT Physical Therapist           Time Calculation:         PT Charges       03/21/17 1604          Time Calculation    Start Time 1535  -      Stop Time 1550  -      Time Calculation (min) 15 min  -      PT Received On 03/21/17  -      PT - Next Appointment 03/22/17  -        User Key  (r) = Recorded By, (t) = Taken By, (c) = Cosigned By    Initials Name Provider Type    HUANG Muir PT Physical Therapist          Therapy Charges for Today     Code Description Service Date Service Provider Modifiers Qty     71350388290 HC PT EVAL MOD COMPLEXITY 2 3/20/2017 Ryann Muir, PT GP 1    67395924125 HC PT THER PROC EA 15 MIN 3/20/2017 Ryann Muir, PT GP 1    31486460784 HC PT THER SUPP EA 15 MIN 3/20/2017 Ryann Muir, PT GP 1    48599623771 HC PT THER PROC EA 15 MIN 3/21/2017 Ryann Muir, PT GP 1    23749568960 HC PT THER SUPP EA 15 MIN 3/21/2017 Ryann Muir, PT GP 1          PT G-Codes  Outcome Measure Options: AM-PAC 6 Clicks Basic Mobility (PT)    Ryann Muir, PT  3/21/2017

## 2017-03-21 NOTE — PLAN OF CARE
Problem: Patient Care Overview (Adult)  Goal: Plan of Care Review  Outcome: Ongoing (interventions implemented as appropriate)    03/21/17 0706   Coping/Psychosocial Response Interventions   Plan Of Care Reviewed With patient   Patient Care Overview   Progress no change   Outcome Evaluation   Outcome Summary/Follow up Plan VSS. BiPAP tolerated throughout the night. NAD.

## 2017-03-21 NOTE — PLAN OF CARE
Problem: Patient Care Overview (Adult)  Goal: Plan of Care Review    03/21/17 1146   Coping/Psychosocial Response Interventions   Plan Of Care Reviewed With patient   Patient Care Overview   Progress improving   Outcome Evaluation   Outcome Summary/Follow up Plan VFSS: Minimal oropharyngeal dysphagia w/ esopahgeal component. Rec: Regular & thin; GI consult         Problem: Inpatient SLP  Goal: Dysphagia- Patient will safely consume diet as per recommendation with no signs/symptoms of aspiration  Outcome: Outcome(s) achieved Date Met:  03/21/17 03/20/17 1523 03/21/17 1146   Safely Consume Diet   Safely Consume Diet- SLP, Date Established 03/20/17 --    Safely Consume Diet- SLP, Time to Achieve by discharge --    Safely Consume Diet- SLP, Additional Goal --  regular & thin   Safely Consume Diet- SLP, Date Goal Reviewed --  03/21/17   Safely Consume Diet- SLP, Outcome --  goal met

## 2017-03-21 NOTE — CONSULTS
Subjective .     REASONS FOR CURRENT CONSULTATION: Metastatic non-small cell lung cancer receiving palliative chemotherapy admitted with pneumonia.    REASONS FOR HEMATOLOGY/ONCOLOGY CARE:  1. Stage IV ([id number]) non-small cell lung cancer (adenocarcinoma, ALK gene rearrangement, EGFR mutation negative and PDL 1 negative) involving lung, liver, bone, and brain on palliative chemotherapy with single agent carboplatin 3/4 weeks.  Status post previous stereotactic radiation to solitary brain metastasis right occipital lobe 12/8/16.  Status post palliative radiation left posterior chest completed in February 2017.  2. 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 Superficial bladder cancer status post TURBT 10/8/13 with subsequent disease recurrence treated with concurrent chemotherapy radiation with weekly low-dose carboplatin and Taxol completed in January 2014.  4. History of bilateral DVT (by patient's report) postoperatively with gross hematuria prompting IVC filter placement.  5. Thrombosis in the left atrial appendage on CT scan, continuing on Eliquis 5 mg twice a day.    HISTORY OF PRESENT ILLNESS:  The patient is a 81 y.o. year old male who we are asked to see following hospital admission with the above-mentioned history.    History of Present Illness   the patient is well-known to our practice with the above history, had been continuing on palliative single agent chemotherapy with weekly carboplatin 3/4 weeks.  His last dose was received on 3/13/17, was due yesterday 3/20/17 for treatment.  He had undergone MRI of the brain and CT scan chest abdomen and pelvis on 3/17/17 to reassess the status of his disease prior to treatment.  In the interval, he was admitted on 3/19/17 with acute hypoxic respiratory failure related to by basilar pneumonia, suspected to be related to aspiration with associated fever to 101.1, leukocytosis with white count today up to 22.6 and chest x-ray  showing new (compared to CT scan 3/17/17) by basilar infiltrates.  He has been treated with IV antibiotics with Zosyn and vancomycin, vancomycin subsequently discontinued with negative MRSA screen and negative cultures to date.  He has improved significantly, initially requiring BiPAP and subsequently transitioned to nasal cannula at 7 L today.  He remains in the intensive care unit awaiting a telemetry bed and was able to get up and walk around the nurse's station today without significant difficulty.    Past Medical History   Diagnosis Date   • Aftercare following surgery    • Anemia      h/o multifactorial anemia   • Atrial fibrillation    • Benign prostatic hypertrophy    • CAD (coronary artery disease)    • Chronic kidney disease      STAGE III   • Fatigue    • H/O thrombocytopenia    • H/O transfusion of packed red blood cells    • H/O: pneumonia 2013     right lower lobe   • History of gross hematuria    • Hyperlipidemia    • Hypertension    • Pulmonary embolism    • Pulmonary hypertension    • Renal insufficiency syndrome    • Transitional cell carcinoma of kidney 2013     status post nephrectomy   • Urothelial carcinoma      Of the bladder - Underwent transurethral reection w/possible residual tumor in the ureter  with concurrent chemoradiation      Past Surgical History   Procedure Laterality Date   • Bladder surgery     • Kidney surgery     • Nephrectomy Right 01/2013   • Cholecystectomy       35 years ago   • Cataract extraction       ONCOLOGIC HISTORY:  (Per Dr Quan note 2/2/17): he had a history of a right sided nephrectomy for renal cell carcinoma. On routine surveillance, he was found to have an abnormality in the bladder and underwent TURBT on October 8, 2013. He then developed recurrent disease and was treated with combination chemotherapy and radiation therapy in December 2013 and January 2014 along with carboplatin and Taxol.     He underwent routine surveillance CAT scans of the abdomen and  pelvis on October 12, 2016 which revealed several noncalcified nodules in the lung bases with the 2 largest on the left measuring 2.2 and 2.3 cm in size. These were new since his previous scan of August 1, 2014. CAT scan of the chest obtained for a CT-guided biopsy on November 10, 2016 showed a suspicious lesion in the left lower lobe measuring 2.9 x 2.4 cm, numerous additional smaller nodules scattered throughout both lungs, a filling defect noted within the left atrial appendage measuring 5.2 x 2.2 cm consistent with clot, slow progression of the esophageal diverticulum noted back to 2006, a number of small sclerotic foci in the thoracic spine and sternum consistent with metastatic disease with one of the largest measuring 2.4 cm at T11. The pathology revealed a primary lung adenocarcinoma.     He underwent an MRI of the brain on November 15, 2016 which showed a solitary metastatic deposit in the right occipital lobe measuring 6 x 6 x 5 mm in size with adjacent vasogenic edema. No other metastatic lesions were identified. Abnormal signal was also noted involving the C3 vertebral body consistent with metastatic disease.      PET scan on November 16, 2016 showed intense hypermetabolism within the 3 cm left lower lobe lung mass with an SUV of 11.3, multiple hypermetabolic nodules noted bilaterally, hypermetabolic lymphadenopathy in the right and left paratracheal regions, AP window and bilateral lindsey, multiple hypermetabolic bone lesions scattered throughout the skeleton with a lesion at C3 measuring 2 cm with an SUV of 7.6, a lesion at T11 where there is a lytic mass measuring 5 cm with an SUV of 11.0, ribs, left clavicle, left glenoid, right iliac and right femoral head, neck and proximal shaft and a long a TURP defect within the prostate.     He underwent stereotactic treatment of the brain lesion receiving a single treatment on December 8, 2016. He tolerated this well and had no significant side effects from the  treatment. He was treated systemically with Taxol.     His most recent CT scans of the chest, abdomen and pelvis on January 19, 2017 showed progression of the pulmonary metastatic disease with increased lung nodules and lymphadenopathy, development of two liver metastasis, multifocal bone metastasis and MRI of the brain on January 25, 2017 showed stability of the 6 mm treated brain metastasis and MRI of the cervical spine on the same date showed stability of the C3 metastasis. The chest CT specifically showed the right upper lobe mass increased to 2.5 x 2.1 cm, an inferior right upper lobe nodule measuring 1.1 cm, left lower lobe mass measuring 3.2 x 2.4 cm, right middle lobe mass measuring 2.5 x 2.3 cm and several smaller lung nodules.     Given the significant progression, he was switched to carboplatin weekly. He developed pain in the left posterior chest wall.  His was felt to be related to disease in the left lower lobe and he was treated with a course of palliative radiation for 13 fractions in February 2017 with improvement in pain.  He developed thrombocytopenia and subsequently neutropenia requiring carboplatin dosing to be held for 2 weeks.  3/13/17 he did resume carboplatin.    No current facility-administered medications on file prior to encounter.      Current Outpatient Prescriptions on File Prior to Encounter   Medication Sig Dispense Refill   • apixaban (ELIQUIS) 5 MG tablet tablet Take 1 tablet by mouth 2 (Two) Times a Day. 60 tablet 5   • Calcium-Vitamin D 600-200 MG-UNIT per tablet Take 1 tablet by mouth 2 (Two) Times a Day.     • CARTIA  MG 24 hr capsule TAKE 1 CAPSULE EVERY DAY 90 capsule 1   • diltiazem (TIAZAC) 300 MG 24 hr capsule Take 300 mg by mouth Daily.     • finasteride (PROSCAR) 5 MG tablet TAKE 1 TABLET DAILY 90 tablet 1   • folic acid (FOLVITE) 400 MCG tablet Take 400 mcg by mouth Daily.     • furosemide (LASIX) 40 MG tablet Take 1 tablet by mouth Daily. Needs appt 30 tablet 0    • HYDROcodone-homatropine (HYCODAN) 5-1.5 MG/5ML syrup Take 5 mL by mouth Every 6 (Six) Hours As Needed for cough. 120 mL 0   • Magnesium Hydroxide (MILK OF MAGNESIA PO) Take  by mouth As Needed.     • metoprolol tartrate (LOPRESSOR) 50 MG tablet Take 50 mg by mouth 2 (two) times a day.     • Multiple Vitamins-Minerals (MULTI COMPLETE) capsule Take 1 tablet by mouth Daily.     • naproxen (NAPROSYN) 375 MG tablet Take 375 mg by mouth 3 (Three) Times a Day With Meals.     • Polyethylene Glycol 3350 (MIRALAX PO) Take 17 Units by mouth Every Night.     • potassium chloride (K-DUR,KLOR-CON) 10 MEQ CR tablet TAKE 1 TABLET EVERY DAY (SUBSTITUTED FOR  K-DUR) 90 tablet 1   • Probiotic capsule Take 1 capsule by mouth 2 (Two) Times a Day.     • Pyridoxine HCl (VITAMIN B-6 PO) Take 100 mg by mouth Daily.     • tamsulosin (FLOMAX) 0.4 MG capsule 24 hr capsule Take 1 capsule by mouth Daily. 90 capsule 1   • vitamin B-12 (CYANOCOBALAMIN) 1000 MCG tablet Take 1,000 mcg by mouth Daily.     • vitamin C (ASCORBIC ACID) 500 MG tablet Take 500 mg by mouth Every Night.     • vitamin E 1000 UNIT capsule Take 400 Units by mouth Daily.         ALLERGIES:   No Known Allergies    Social History     Social History   • Marital status:      Spouse name: N/A   • Number of children: N/A   • Years of education: High school     Occupational History   •  [employer]     Social History Main Topics   • Smoking status: Former Smoker     Packs/day: 1.00     Years: 15.00     Types: Cigarettes   • Smokeless tobacco: Former User   • Alcohol use Yes      Comment: Occasional   • Drug use: No   • Sexual activity: Not on file     Other Topics Concern   • Not on file     Social History Narrative     Family History   Problem Relation Age of Onset   • Cancer Father    • Heart disease Brother          A full review of systems was obtained with pertinent positive findings as noted in the history of present illness above.  All other  systems negative.      Objective      Vitals:    03/21/17 1740   BP: 132/76   Pulse: (!) 123   Resp:    Temp:    SpO2:       Current Status 3/13/2017   ECOG score 0       Physical Exam   Constitutional: He is oriented to person, place, and time. He appears well-developed and well-nourished.   Elderly gentleman in no apparent distress with oxygen in place via nasal cannula, no apparent distress.   HENT:   Mouth/Throat: Oropharynx is clear and moist.   Eyes: Conjunctivae are normal.   Neck: No thyromegaly present.   Cardiovascular: Exam reveals no gallop and no friction rub.    No murmur heard.  Irregular, tachycardic   Pulmonary/Chest: No respiratory distress.   A few scattered crackles in the lung bases bilaterally.   Abdominal: Soft. Bowel sounds are normal. He exhibits no distension. There is no tenderness.   Musculoskeletal: He exhibits edema.   Chronic 1-2+ bilateral lower extremity edema with venous stasis changes noted   Lymphadenopathy:        Head (right side): No submandibular adenopathy present.     He has no cervical adenopathy.     He has no axillary adenopathy.        Right: No inguinal and no supraclavicular adenopathy present.        Left: No inguinal and no supraclavicular adenopathy present.   Neurological: He is alert and oriented to person, place, and time. He displays normal reflexes. No cranial nerve deficit. He exhibits normal muscle tone.   Skin: Skin is warm and dry. No rash noted.   Psychiatric: He has a normal mood and affect. His behavior is normal.       RECENT LABS:  Hematology WBC   Date Value Ref Range Status   03/20/2017 22.66 (H) 4.50 - 10.70 10*3/mm3 Final     RBC   Date Value Ref Range Status   03/20/2017 3.39 (L) 4.60 - 6.00 10*6/mm3 Final     HEMOGLOBIN   Date Value Ref Range Status   03/20/2017 10.7 (L) 13.7 - 17.6 g/dL Final     HEMATOCRIT   Date Value Ref Range Status   03/20/2017 33.8 (L) 40.4 - 52.2 % Final     PLATELETS   Date Value Ref Range Status   03/20/2017 176 140 -  500 10*3/mm3 Final        Lab Results   Component Value Date    GLUCOSE 106 (H) 03/20/2017    BUN 23 03/20/2017    CREATININE 1.49 (H) 03/20/2017    EGFRIFNONA 45 (L) 03/20/2017    EGFRIFAFRI 51 (L) 01/07/2016    BCR 15.4 03/20/2017    K 4.7 03/20/2017    CO2 31.0 (H) 03/20/2017    CALCIUM 8.9 03/20/2017    PROTENTOTREF 7.7 03/10/2015    ALBUMIN 3.90 03/19/2017    LABIL2 1.1 03/19/2017    AST 22 03/19/2017    ALT 24 03/19/2017     MRI brain 3/17/17:  IMPRESSION:  The area of vasogenic edema involving the right occipital  lobe has decreased in size. The area of enhancement has also decreased  from approximately 8 mm in craniocaudal dimension to approximately 5 mm.  Is has decreased in transverse dimension by approximately 1 mm. No new  intra-axial enhancement is identified. Stable appearance of a nodular  area of enhancement measuring approximate 5 mm in size involving the  superior and medial aspect of the left orbit. See above.    CT chest 3/17/17:  IMPRESSION:  1. Multiple masses in both lungs, given differences in technique and  slice selection there is no significant progression of disease.  2. Follow-up examinations are recommended.    CT abdomen pelvis 3/17/17:  IMPRESSION:  Liver and bone metastases appear stable in size and number  compared with previous imaging of January 2017. No new evidence for  metastatic disease is present.    Assessment/Plan     1. Stage IV ([id number]) non-small cell lung cancer (adenocarcinoma, ALK gene rearrangement, EGFR mutation negative and PDL 1 negative) involving lung, liver, bone, and brain on palliative chemotherapy with single agent carboplatin 3/4 weeks.  Status post previous stereotactic radiation to solitary brain metastasis right occipital lobe 12/8/16.  Status post palliative radiation left posterior chest completed in February 2017.  Carboplatin was held for 2 weeks due to myelosuppression with initially thrombocytopenia and subsequently neutropenia.  He did resume  treatment on 3/13/17.  He is now admitted with by basilar pneumonia and will be discussed below.  He did undergo repeat imaging to assess the status of his disease on 3/17/17.  We did review those results today.  MRI of the brain showed response to prior stereotactic radiation with decrease in size of the lesion and surrounding edema in the right occipital lobe with no new sites of disease.  CT of the chest showed stability of the bilateral pulmonary nodules.  CT abdomen and pelvis showed the 2 hepatic metastatic lesions to be stable and also showed stability of bone metastases.  With evidence of stable disease, once the patient recovers from his current infection, we will discuss resuming chemotherapy when he returns to the office to see Dr. Garcia.  Chemotherapy was due 3/20/17 but at this point is on hold due to infection.  2. Bibasilar pneumonia: The patient is currently receiving Zosyn for suspected aspiration pneumonia.  He has had fever, leukocytosis, and acute hypoxic respiratory failure. He is responding to antibiotics well and is oxygen requirement is decreasing, currently at 7 L nasal cannula.  3. Stage III chronic kidney disease: The patient's creatinine is actually below his baseline with hydration.  He is status post previous right nephrectomy in 2013.  4. 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  He has an IVC filter in place.  He is not experienced any bleeding difficulties.

## 2017-03-21 NOTE — PLAN OF CARE
Problem: Patient Care Overview (Adult)  Goal: Plan of Care Review  Outcome: Ongoing (interventions implemented as appropriate)    03/21/17 9658   Coping/Psychosocial Response Interventions   Plan Of Care Reviewed With patient   Patient Care Overview   Progress improving   Outcome Evaluation   Outcome Summary/Follow up Plan Pt demonstrates increased activity tolerance and functional strength as he was able to increase his gait distance and required less assistance. Pt may benefit from continued PT services to address strength, mobility, and activity.

## 2017-03-21 NOTE — PROGRESS NOTES
[facility] INPATIENT PROGRESS NOTE         [facility] INTENSIVE CARE    3/21/2017      PATIENT IDENTIFICATION:   Name:  Alexander S Mendelsberg      MRN:  [id number]     81 y.o.  male             LOS 2    Reason for visit: Follow-up acute hypoxemic respiratory failure with bilateral pneumonia/pneumonitis and lung cancer      SUBJECTIVE:    Off BiPAP on 7L high flow supplemental oxygen.  Denies chest pain.  Mild nonproductive cough.    Objective   OBJECTIVE:    Vital Sign Min/Max for last 24 hours  Temp  Min: 95.2 °F (35.1 °C)  Max: 98.3 °F (36.8 °C)   BP  Min: 89/72  Max: 142/83   Pulse  Min: 89  Max: 122   Resp  Min: 16  Max: 24   SpO2  Min: 89 %  Max: 98 %   Flow (L/min)  Min: 7  Max: 10   No Data Recorded                         Body mass index is 34.25 kg/(m^2).    Intake/Output Summary (Last 24 hours) at 03/21/17 0953  Last data filed at 03/21/17 0837   Gross per 24 hour   Intake   1004 ml   Output   1800 ml   Net   -796 ml         Exam:  GEN:  No distress, appears stated age  EYES:   PERRL, anicteric sclera  ENT:    External ears/nose normal, OP clear  NECK:  No adenopathy, midline trachea  LUNGS: Normal chest on inspection, palpation and diminished bilaterally on auscultation  CV:  Normal S1S2, without murmur  ABD:  Non tender, non distended, no hepatosplenomegaly, +BS  EXT:  No edema, cyanosis or clubbing    Scheduled meds:      apixaban 5 mg Oral BID   diltiaZEM  mg Oral Q24H   finasteride 5 mg Oral Daily   ipratropium-albuterol 3 mL Nebulization Q4H - RT   metoprolol tartrate 50 mg Oral BID   piperacillin-tazobactam 3.375 g Intravenous Q8H   polyethylene glycol 17 g Oral Daily   tamsulosin 0.4 mg Oral Daily     IV meds:                          Pharmacy to Dose Zosyn      Data Review:    Results from last 7 days  Lab Units 03/20/17  0518 03/19/17  0431  03/17/17  1423 03/17/17  1420   SODIUM mmol/L 141 142  --   --   --    POTASSIUM mmol/L 4.7 4.4  --   --   --    CHLORIDE mmol/L 99 97*   --   --   --    TOTAL CO2 mmol/L 31.0* 34.5*  --   --   --    BUN mg/dL 23 27*  --   --   --    CREATININE mg/dL 1.49* 1.71*  --  1.80* 1.80*   GLUCOSE mg/dL 106* 189*  < >  --   --    CALCIUM mg/dL 8.9 9.9  --   --   --    < > = values in this interval not displayed.      Estimated Creatinine Clearance: 56.9 mL/min (by C-G formula based on Cr of 1.49).    Results from last 7 days  Lab Units 03/20/17  0518 03/19/17  0431   WBC 10*3/mm3 22.66* 18.75*   HEMOGLOBIN g/dL 10.7* 12.1*   PLATELETS 10*3/mm3 176 221       Results from last 7 days  Lab Units 03/19/17  0431   INR  1.33*       Results from last 7 days  Lab Units 03/19/17  0431   ALT (SGPT) U/L 24   AST (SGOT) U/L 22       Results from last 7 days  Lab Units 03/20/17  0412   PH, ARTERIAL pH units 7.398   PO2 ART mm Hg 88.9   PCO2, ARTERIAL mm Hg 59.7*   HCO3 ART mmol/L 36.8*     Microbiology reviewed: NGTD        CT chest reviewed:  IMPRESSION:  1. Multiple masses in both lungs, given differences in technique and  slice selection there is no significant progression of disease.  2. Follow-up examinations are recommended.    MRI brain reviewed    Assessment   ASSESSMENT:   Acute hypoxemic respiratory failure  Acute bilateral pneumonia/pneumonitis with aspiration  Dysphagia  Metastatic adenocarcinoma the lung  Atrial fibrillation with RVR  History left atrial clot  Chronic kidney disease III  BPH  History of renal cell carcinoma and right nephrectomy  History of bladder cancer  Coronary artery disease  Lactic acidosis: Improved  Sepsis: Improved      PLAN:  Weaning oxygen as able.  Encourage good pulmonary toilet.  Continue antibiotics and narrow based on culture results.  On anticoagulation for atrial fibrillation and left atrial clot history  Awaiting floor bed.     Kana Ball MD  Pulmonary and Critical Care Medicine  [facility], [facility]  3/21/2017    9:53 AM

## 2017-03-22 ENCOUNTER — INPATIENT HOSPITAL (OUTPATIENT)
Dept: URBAN - METROPOLITAN AREA HOSPITAL 113 | Facility: HOSPITAL | Age: 82
End: 2017-03-22
Payer: MEDICARE

## 2017-03-22 DIAGNOSIS — K22.4 DYSKINESIA OF ESOPHAGUS: ICD-10-CM

## 2017-03-22 DIAGNOSIS — K21.9 GASTRO-ESOPHAGEAL REFLUX DISEASE WITHOUT ESOPHAGITIS: ICD-10-CM

## 2017-03-22 PROCEDURE — 99222 1ST HOSP IP/OBS MODERATE 55: CPT | Performed by: INTERNAL MEDICINE

## 2017-03-22 NOTE — PROGRESS NOTES
[facility] INPATIENT PROGRESS NOTE         [facility] INTENSIVE CARE    3/22/2017      PATIENT IDENTIFICATION:   Name:  Alexander S Mendelsberg      MRN:  [id number]     81 y.o.  male             LOS 3    Reason for visit: Follow-up acute hypoxemic respiratory failure with bilateral pneumonia/pneumonitis and lung cancer      SUBJECTIVE:    Less cough.  Denies chest pain.  Mild nonproductive cough.    Objective   OBJECTIVE:    Vital Sign Min/Max for last 24 hours  Temp  Min: 97.5 °F (36.4 °C)  Max: 98 °F (36.7 °C)   BP  Min: 116/87  Max: 152/88   Pulse  Min: 85  Max: 174   Resp  Min: 14  Max: 20   SpO2  Min: 85 %  Max: 98 %   Flow (L/min)  Min: 6  Max: 8   No Data Recorded                         Body mass index is 34.25 kg/(m^2).    Intake/Output Summary (Last 24 hours) at 03/22/17 1107  Last data filed at 03/22/17 0830   Gross per 24 hour   Intake              516 ml   Output             2575 ml   Net            -2059 ml         Exam:  GEN:  No distress, appears stated age  EYES:   PERRL, anicteric sclera  ENT:    External ears/nose normal, OP clear  NECK:  No adenopathy, midline trachea  LUNGS: Normal chest on inspection, palpation and diminished bilaterally on auscultation  CV:  Normal S1S2, without murmur  ABD:  Non tender, non distended, no hepatosplenomegaly, +BS  EXT:  No edema, cyanosis or clubbing    Scheduled meds:      apixaban 5 mg Oral BID   diltiaZEM  mg Oral Q24H   finasteride 5 mg Oral Daily   ipratropium-albuterol 3 mL Nebulization Q4H - RT   metoprolol tartrate 50 mg Oral BID   piperacillin-tazobactam 3.375 g Intravenous Q8H   polyethylene glycol 17 g Oral Daily   tamsulosin 0.4 mg Oral Daily     IV meds:                          Pharmacy to Dose Zosyn      Data Review:    Results from last 7 days  Lab Units 03/20/17  0518 03/19/17  0431  03/17/17  1423 03/17/17  1420   SODIUM mmol/L 141 142  --   --   --    POTASSIUM mmol/L 4.7 4.4  --   --   --    CHLORIDE mmol/L 99 97*  --   --    --    TOTAL CO2 mmol/L 31.0* 34.5*  --   --   --    BUN mg/dL 23 27*  --   --   --    CREATININE mg/dL 1.49* 1.71*  --  1.80* 1.80*   GLUCOSE mg/dL 106* 189*  < >  --   --    CALCIUM mg/dL 8.9 9.9  --   --   --    < > = values in this interval not displayed.      Estimated Creatinine Clearance: 56.9 mL/min (by C-G formula based on Cr of 1.49).    Results from last 7 days  Lab Units 03/20/17  0518 03/19/17  0431   WBC 10*3/mm3 22.66* 18.75*   HEMOGLOBIN g/dL 10.7* 12.1*   PLATELETS 10*3/mm3 176 221       Results from last 7 days  Lab Units 03/19/17  0431   INR  1.33*       Results from last 7 days  Lab Units 03/19/17  0431   ALT (SGPT) U/L 24   AST (SGOT) U/L 22       Results from last 7 days  Lab Units 03/20/17  0412   PH, ARTERIAL pH units 7.398   PO2 ART mm Hg 88.9   PCO2, ARTERIAL mm Hg 59.7*   HCO3 ART mmol/L 36.8*     Microbiology reviewed: NGTD        CT chest reviewed:  IMPRESSION:  1. Multiple masses in both lungs, given differences in technique and  slice selection there is no significant progression of disease.  2. Follow-up examinations are recommended.    MRI brain reviewed    Assessment   ASSESSMENT:   Acute hypoxemic respiratory failure  Acute bilateral pneumonia/pneumonitis with aspiration  Dysphagia  Metastatic adenocarcinoma the lung  Atrial fibrillation with RVR  History left atrial clot  Chronic kidney disease III  BPH  History of renal cell carcinoma and right nephrectomy  History of bladder cancer  Coronary artery disease  Lactic acidosis: Improved  Sepsis: Improved      PLAN:  Weaning oxygen as able.  Encourage good pulmonary toilet.  Continue antibiotics and narrow based on culture results.  On anticoagulation for atrial fibrillation and left atrial clot history  Barium esophagram order noted .  Awaiting floor bed.     Kana Ball MD  Pulmonary and Critical Care Medicine  [facility], [facility]  3/22/2017    11:07 AM

## 2017-03-22 NOTE — PROGRESS NOTES
Acute Care - Physical Therapy Treatment Note  [facility]     Patient Name: Alexander S Mendelsberg  : 1935  MRN: [id number]  Today's Date: 3/22/2017  Onset of Illness/Injury or Date of Surgery Date: 17          Admit Date: 3/19/2017    Visit Dx:    ICD-10-CM ICD-9-CM   1. Sepsis, due to unspecified organism A41.9 038.9     995.91   2. Pneumonia of both lungs due to infectious organism, unspecified part of lung J18.9 483.8   3. Difficulty walking R26.2 719.7     Patient Active Problem List   Diagnosis   • Anxiety   • Cough   • Chronic kidney disease, stage III (moderate)   • Difficulty breathing   • Fatigue   • Palpitations   • Hypertension   • Impaired fasting glucose   • Arthralgia of hip   • Renal insufficiency   • O2 dependent   • Pain in right knee   • Arthritis of right knee   • Abnormal electrocardiogram   • Coronary arteriosclerosis in native artery   • Chronic atrial fibrillation   • Chronic coronary artery disease   • Shortness of breath   • Status post coronary artery balloon dilation   • Hyperlipidemia   • Adiposity   • Right fascicular block   • Right flank pain   • Renal cell carcinoma of right kidney   • Weight loss   • History of gross hematuria   • H/O thrombocytopenia   • Pulmonary hypertension   • Left atrial thrombus   • Bone metastases   • Brain metastasis   • Malignant neoplasm of overlapping sites of left lung   • Sepsis               Adult Rehabilitation Note       17 1353 17 1550       Rehab Assessment/Intervention    Discipline physical therapist  -AR physical therapist  -CH     Document Type therapy note (daily note)  -AR therapy note (daily note)  -CH     Subjective Information agree to therapy  -AR agree to therapy  -CH     Patient Effort, Rehab Treatment good  -AR good  -CH     Symptoms Noted During/After Treatment  none  -CH     Precautions/Limitations fall precautions;oxygen therapy device and L/min   6L NC  -AR fall precautions  -CH     Recorded by [AR]  Marquita Mena, PT [CH] Ryann Muir, PT     Vital Signs    Pre SpO2 (%) 95  -AR      O2 Delivery Pre Treatment supplemental O2  -AR      Recorded by [AR] Marquita Mena PT      Pain Assessment    Pain Assessment No/denies pain  -AR No/denies pain  -CH     Recorded by [AR] Marquita Mena PT [CH] Ryann Muir, PT     Cognitive Assessment/Intervention    Current Cognitive/Communication Assessment functional  -AR functional  -CH     Orientation Status oriented x 4  -AR oriented x 4  -CH     Follows Commands/Answers Questions 100% of the time  -% of the time  -CH     Personal Safety  WNL/WFL  -CH     Personal Safety Interventions  fall prevention program maintained;gait belt;nonskid shoes/slippers when out of bed  -CH     Recorded by [AR] Marquita Mena PT [CH] Ryann Muir, PT     Bed Mobility, Assessment/Treatment    Bed Mobility, Assistive Device bed rails;head of bed elevated  -AR      Bed Mob, Supine to Sit, [place] minimum assist (75% patient effort)  -AR not tested  -CH     Bed Mob, Sit to Supine, [place] not tested  -AR not tested  -CH     Bed Mobility, Comment  sitting in chair  -CH     Recorded by [AR] Marquita Mena PT [CH] Ryann Muir, PT     Transfer Assessment/Treatment    Transfers, Sit-Stand [place] contact guard assist  -AR verbal cues required;nonverbal cues required (demo/gesture);contact guard assist;2 person assist required;hand held assist  -     Transfers, Stand-Sit [place] contact guard assist  -AR verbal cues required;nonverbal cues required (demo/gesture);contact guard assist;2 person assist required;hand held assist  -CH     Recorded by [AR] Marquita Mena, PT [CH] Ryann Muir, PT     Gait Assessment/Treatment    Gait, [place] Level contact guard assist  -AR verbal cues required;nonverbal cues required (demo/gesture);contact guard assist;1 person + 1 person to manage equipment  -     Gait, Distance (Feet) 150  -  -CH      Gait, Gait Deviations decreased heel strike;claudio decreased  -AR claudio decreased;step length decreased;stride length decreased  -     Gait, Safety Issues supplemental O2;step length decreased;weight-shifting ability decreased  -AR supplemental O2  -     Gait, Comment UE support on IV pole  -AR some SOA noted at end of ambulation but O2 sats in low 90s%  -CH     Recorded by [AR] Marquita Mena, PT [CH] Ryann Muir PT     Positioning and Restraints    Pre-Treatment Position in bed  -AR sitting in chair/recliner  -CH     Post Treatment Position --   transport cart  -AR chair  -CH     In Chair  sitting;call light within reach;encouraged to call for assist;with family/caregiver  -CH     Recorded by [AR] Marquita Mena, PT [CH] Ryann Muir PT       User Key  (r) = Recorded By, (t) = Taken By, (c) = Cosigned By    Initials Name Effective Dates     Ryann Muir, PT 12/01/15 -     AR Marquita Mena, PT 06/27/16 -                 IP PT Goals       03/20/17 1513          Bed Mobility PT LTG    Bed Mobility PT LTG, Time to Achieve 1 wk  -CH      Bed Mobility PT LTG, Activity Type all bed mobility  -CH      Bed Mobility PT LTG, [place] Level supervision required  -CH      Transfer Training PT LTG    Transfer Training PT LTG, Time to Achieve 1 wk  -CH      Transfer Training PT LTG, Activity Type all transfers  -CH      Transfer Training PT LTG, [place] Level supervision required  -CH      Gait Training PT LTG    Gait Training Goal PT LTG, Time to Achieve 1 wk  -CH      Gait Training Goal PT LTG, [place] Level supervision required  -CH      Gait Training Goal PT LTG, Distance to Achieve 200  -CH        User Key  (r) = Recorded By, (t) = Taken By, (c) = Cosigned By    Initials Name Provider Type    HUANG Muir PT Physical Therapist          Physical Therapy Education     Title: PT OT SLP Therapies (Done)     Topic: Physical Therapy (Done)     Point: Mobility training  (Done)    Learning Progress Summary    Learner Readiness Method Response Comment Documented by Status   Patient Acceptance E VU  AR 03/22/17 1405 Done    Acceptance E,D,TB VU,NR   03/21/17 1602 Done    Acceptance E,TB,D VU,NR   03/20/17 1512 Done               Point: Home exercise program (Done)    Learning Progress Summary    Learner Readiness Method Response Comment Documented by Status   Patient Acceptance E VU  AR 03/22/17 1405 Done               Point: Body mechanics (Done)    Learning Progress Summary    Learner Readiness Method Response Comment Documented by Status   Patient Acceptance E VU  AR 03/22/17 1405 Done    Acceptance E,D,TB VU,NR   03/21/17 1602 Done    Acceptance E,TB,D VU,NR   03/20/17 1512 Done               Point: Precautions (Done)    Learning Progress Summary    Learner Readiness Method Response Comment Documented by Status   Patient Acceptance E VU  AR 03/22/17 1405 Done    Acceptance E,D,TB VU,NR   03/21/17 1602 Done    Acceptance E,TB,D VU,NR   03/20/17 1512 Done                      User Key     Initials Effective Dates Name Provider Type Discipline     12/01/15 -  Ryann Muir, PT Physical Therapist PT    AR 06/27/16 -  Marquita Mena, PT Physical Therapist PT                    PT Recommendation and Plan  Anticipated Discharge Disposition: home with home health  Planned Therapy Interventions: balance training, bed mobility training, home exercise program, gait training, patient/family education, transfer training  PT Frequency: daily  Plan of Care Review  Plan Of Care Reviewed With: patient  Outcome Summary/Follow up Plan: Ambulated 150' w/ UE support on IV pole demonstrates fatigue and shortness of breath.           Outcome Measures       03/22/17 1400 03/21/17 1600 03/20/17 1500    How much help from another person do you currently need...    Turning from your back to your side while in flat bed without using bedrails? 3  -AR 3  -CH 3  -CH    Moving from lying on  back to sitting on the side of a flat bed without bedrails? 3  -AR 3  -CH 3  -CH    Moving to and from a bed to a chair (including a wheelchair)? 3  -AR 3  -CH 3  -CH    Standing up from a chair using your arms (e.g., wheelchair, bedside chair)? 3  -AR 3  -CH 3  -CH    Climbing 3-5 steps with a railing? 3  -AR 2  -CH 2  -CH    To walk in hospital room? 3  -AR 3  -CH 3  -CH    AM-PAC 6 Clicks Score 18  -AR 17  -CH 17  -CH    Functional Assessment    Outcome Measure Options  AM-PAC 6 Clicks Basic Mobility (PT)  -CH AM-PAC 6 Clicks Basic Mobility (PT)  -CH      User Key  (r) = Recorded By, (t) = Taken By, (c) = Cosigned By    Initials Name Provider Type     Ryann Muir, PT Physical Therapist    MAGDI Mena PT Physical Therapist           Time Calculation:         PT Charges       03/22/17 1406          Time Calculation    Start Time 1342  -AR      Stop Time 1358  -AR      Time Calculation (min) 16 min  -AR      PT Received On 03/22/17  -AR      PT - Next Appointment 03/23/17  -AR        User Key  (r) = Recorded By, (t) = Taken By, (c) = Cosigned By    Initials Name Provider Type    MAGDI Mena PT Physical Therapist          Therapy Charges for Today     Code Description Service Date Service Provider Modifiers Qty    46236450184 HC PT THER PROC EA 15 MIN 3/22/2017 Marquita Mena PT GP 1          PT G-Codes  Outcome Measure Options: AM-PAC 6 Clicks Basic Mobility (PT)    Marquita Mena PT  3/22/2017

## 2017-03-22 NOTE — PROGRESS NOTES
LOS: 3 days       Chief Complaint: Metastatic non-small cell lung cancer receiving palliative chemotherapy admitted with aspiration pneumonia.        Interval History: The patient was transferred out of the intensive care unit.  He is continuing to improve clinically.  He was able to ambulate on his own around the nurse's station without significant difficulty.  He does continue with cough that is productive.  He remains afebrile.        Review of Systems:    Review of systems was obtained with pertinent positive findings as noted in the interval history.  All other systems negative.    Objective     Vital Signs  Temp:  [97.7 °F (36.5 °C)-98 °F (36.7 °C)] 97.7 °F (36.5 °C)  Heart Rate:  [] 100  Resp:  [16-20] 18  BP: (116-152)/() 129/69        Physical Exam:     GENERAL:  [place] gentleman lying in bed, no apparent distress  SKIN:  Warm, dry without rashes, purpura or petechiae.  MOUTH:  Tongue is well-papillated; no stomatitis or ulcers.  Lips normal.  THROAT:  Oropharynx without lesions or exudates.  CHEST:  A few scattered bibasilar rhonchi anteriorly  CARDIAC:  Irregularly irregular  ABDOMEN:  Soft, nontender with no organomegaly or masses.  EXTREMITIES: Chronic 1+ bilateral lower extremity edema with venous stasis changes noted.  NEUROLOGICAL:  Cranial Nerves II-XII grossly intact.  No focal neurological deficits.  PSYCHIATRIC:  Normal affect and mood.           Results Review:     I reviewed the patient's new clinical results.      Results from last 7 days  Lab Units 03/20/17  0518   WBC 10*3/mm3 22.66*   HEMOGLOBIN g/dL 10.7*   HEMATOCRIT % 33.8*   PLATELETS 10*3/mm3 176     Lab Results   Component Value Date    NEUTROABS 15.38 (H) 03/19/2017       Results from last 7 days  Lab Units 03/20/17  0518   SODIUM mmol/L 141   POTASSIUM mmol/L 4.7   CHLORIDE mmol/L 99   TOTAL CO2 mmol/L 31.0*   BUN mg/dL 23   CREATININE mg/dL 1.49*   GLUCOSE mg/dL 106*   CALCIUM mg/dL 8.9       Results from last 7  days  Lab Units 03/19/17  0431   INR  1.33*     Barium swallow 3/22/17:  CONCLUSION: Somewhat limited exam but with no demonstrated aspiration.  There is a large lobulated epiphrenic diverticulum protruding from the  right side of the distal thoracic esophagus, with its inferior aspect  located approximately 6 cm above the hiatal herniated esophagogastric  junction. Large amount of food material and debris in the diverticulum  and mid to distal esophagus was intermittently demonstrated to undergo  backflow toward the upper esophagus with episodes of mid to distal  esophageal spasticity. Peristalsis in the esophagus was poor. There is  some spasticity and probable fixed narrowing of caliber of the distal  esophagus immediately above a suspected small sliding hiatal herniation  of the upper margin of the stomach. A barium tablet was not  administered.            Medication Review: Yes     Assessment/Plan      1. Stage IV (P9MsT7c) non-small cell lung cancer (adenocarcinoma, ALK gene rearrangement, EGFR mutation negative and PDL 1 negative) involving lung, liver, bone, and brain on palliative chemotherapy with single agent carboplatin 3/4 weeks. Status post previous stereotactic radiation to solitary brain metastasis right occipital lobe 12/8/16. Status post palliative radiation left posterior chest completed in February 2017. Carboplatin was held for 2 weeks due to myelosuppression with initially thrombocytopenia and subsequently neutropenia. He did resume treatment on 3/13/17. He is now admitted with by basilar pneumonia that will be discussed below. He did undergo repeat imaging to assess the status of his disease on 3/17/17. MRI of the brain showed response to prior stereotactic radiation with decrease in size of the lesion and surrounding edema in the right occipital lobe with no new sites of disease. CT of the chest showed stability of the bilateral pulmonary nodules. CT abdomen and pelvis showed the 2 hepatic  metastatic lesions to be stable and also showed stability of bone metastases. With evidence of stable disease, once the patient recovers from his current infection, we will discuss resuming chemotherapy when he returns to the office to see Dr. Garcia. Chemotherapy was due 3/20/17 but at this point is on hold due to infection.  2. Bibasilar pneumonia: The patient is currently receiving Zosyn for suspected aspiration pneumonia. He has had fever, leukocytosis, and acute hypoxic respiratory failure. He is responding to antibiotics well and is oxygen requirement has decreased.  WBC increased slightly today to 22,000.  He remains afebrile.  3. Aspiration with esophageal diverticulum: Barium swallow confirms a diverticulum on the right side of the distal thoracic esophagus with retained food material and debris, likely the source of his aspiration.  There is also poor peristalsis.  We will await GI review of the study for any further recommendations.  4. Stage III chronic kidney disease: The patient's creatinine was actually below his baseline with hydration on 3/20. He is status post previous right nephrectomy in 2013.  5. 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 He has an IVC filter in place. He is not experienced any bleeding difficulties.  6. Anemia: The patient had actually maintained a hemoglobin in the 11-12 range even on chemotherapy and with underlying stage III chronic kidney disease.  Currently his hemoglobin is down to 10.7 reflective of his current inflammatory state with pneumonia in the setting of ongoing chemotherapy and underlying malignancy.  We will check an anemia evaluation (iron panel, ferritin, B12, folate) to rule out any deficiencies that could be corrected.    Will follow along and arrange outpatient followup when ready for discharge.        Reza Summers MD  03/22/17  4:38 PM

## 2017-03-22 NOTE — PROGRESS NOTES
Discharge Planning Assessment  [facility]     Patient Name: Alexander S Mendelsberg  MRN: [id number]  Today's Date: 3/22/2017    Admit Date: 3/19/2017          Discharge Needs Assessment       03/22/17 1417    Living Environment    Lives With spouse    Living Arrangements house    Provides Primary Care For no one    Quality Of Family Relationships supportive    Able to Return to Prior Living Arrangements yes    Discharge Needs Assessment    Concerns To Be Addressed no discharge needs identified;denies needs/concerns at this time    Readmission Within The Last 30 Days no previous admission in last 30 days    Anticipated Changes Related to Illness none    Equipment Currently Used at Home oxygen;bath bench;walker, rolling   Oxygen 3L supplied from [employer]    Equipment Needed After Discharge none    Transportation Available car;family or friend will provide            Discharge Plan       03/22/17 1418    Case Management/Social Work Plan    Plan Home with wife    Patient/Family In Agreement With Plan yes   Martha Mendelsberg(wife)  [phone]    Additional Comments Introduced self and explained role of CCP, IMM checked and facesheet verified with wife with patient's permission.  Wife states patient is independent with ADLs at home and lives in a one story house with her and currently uses oxygen 3L supplied from [employer] and has a rolling walker and bath bench available if needed at home but currently doesn't use.  WIfe states patient uses [facility] on [address], [place] and denies any issues with affording or obtaining medications.  Wife states plan is for patient to return home with her and denies need for home health or equipment at discharge.  CCP will continue to follow and assist as needed....Lily Marie RN,CCP         Discharge Placement     No information found                Demographic Summary       03/22/17 1416    Referral Information    Admission Type inpatient     Arrived From home or self-care    Contact Information    Permission Granted to Share Information With family/designee   Martha Mendelsberg(wife) [phone]    Primary Care Physician Information    Name Brian Aguilera Jr., MD            Functional Status       03/22/17 1416    Functional Status Current    Ambulation 2-->assistive person    Transferring 2-->assistive person    Toileting 2-->assistive person    Bathing 2-->assistive person    Dressing 2-->assistive person    Eating 0-->independent    Communication 0-->understands/communicates without difficulty    Change in Functional Status Since Onset of Current Illness/Injury yes    Functional Status Prior    Ambulation 0-->independent    Transferring 0-->independent    Toileting 0-->independent    Bathing 0-->independent    Dressing 0-->independent    Eating 0-->independent    Communication 0-->understands/communicates without difficulty    IADL    Medications assistive person    Meal Preparation assistive person    Housekeeping assistive person    Laundry assistive person    Shopping assistive person    Oral Care independent    Activity Tolerance    Current Activity Limitations none    Usual Activity Tolerance moderate    Current Activity Tolerance fair    Cognitive/Perceptual/Developmental    Current Mental Status/Cognitive Functioning no deficits noted    Recent Changes in Mental Status/Cognitive Functioning no changes    Developmental Stage (Eriksson's Stages of Development) Stage 8 (65 years-death/Late Adulthood) Integrity vs. Despair            Psychosocial     None            Abuse/Neglect     None            Legal     None            Substance Abuse     None            Patient Forms     None          Lily Marie RN

## 2017-03-22 NOTE — PLAN OF CARE
Problem: Patient Care Overview (Adult)  Goal: Plan of Care Review    03/22/17 1401   Coping/Psychosocial Response Interventions   Plan Of Care Reviewed With patient   Outcome Evaluation   Outcome Summary/Follow up Plan Ambulated 150' w/ UE support on IV pole demonstrates fatigue and shortness of breath.

## 2017-03-22 NOTE — CONSULTS
"Inpatient Consult to Gastroenterology  Consult performed by: RAJESH DIEGO  Consult ordered by: SOREN RIOS          Patient Care Team:  Sergio Green MD (Inactive) as PCP - General (Family Medicine)  Sergio Green MD (Inactive) as Referring Physician (Family Medicine)  Som Garrett MD as Consulting Physician (Cardiology)  Joo Christensen Jr., MD as Consulting Physician (Urology)  Katelynn Quan MD as Consulting Physician (Radiation Oncology)  Vahid Garcia MD as Consulting Physician (Hematology and Oncology)    Chief complaint: \"aspiration \"    Subjective     History of Present Illness  Presented with respiratory failure,  He thought he may have had regurgitation the other night prior to have his respiratory decompensation,  Admitted with respiratory failure and pneumonia.  He denies any known swallow issues.  Speech pathology deemed he had a normal swallowing but had concerns about \"backflow\" from esophagus.  He unfortunately has advanced malignancy.   Review of Systems   Constitutional: Positive for fatigue and fever.   HENT: Negative.    Eyes: Negative.    Respiratory: Positive for shortness of breath.    Gastrointestinal: Negative.    Endocrine: Negative.    Genitourinary: Negative.    Musculoskeletal: Negative.    Skin: Negative.    Allergic/Immunologic: Negative.    Neurological: Negative.    Hematological: Negative.    Psychiatric/Behavioral: Negative.         Past Medical History:   Diagnosis Date   • Aftercare following surgery    • Anemia     h/o multifactorial anemia   • Atrial fibrillation    • Benign prostatic hypertrophy    • CAD (coronary artery disease)    • Chronic kidney disease     STAGE III   • Fatigue    • H/O thrombocytopenia    • H/O transfusion of packed red blood cells    • H/O: pneumonia 2013    right lower lobe   • History of gross hematuria    • Hyperlipidemia    • Hypertension    • Pulmonary embolism    • Pulmonary hypertension    • Renal insufficiency " syndrome    • Transitional cell carcinoma of kidney 2013    status post nephrectomy   • Urothelial carcinoma     Of the bladder - Underwent transurethral reection w/possible residual tumor in the ureter  with concurrent chemoradiation    ,   Past Surgical History:   Procedure Laterality Date   • BLADDER SURGERY     • CATARACT EXTRACTION     • CHOLECYSTECTOMY      35 years ago   • KIDNEY SURGERY     • NEPHRECTOMY Right 01/2013   ,   Family History   Problem Relation Age of Onset   • Cancer Father    • Heart disease Brother    ,   Social History   Substance Use Topics   • Smoking status: Former Smoker     Packs/day: 1.00     Years: 15.00     Types: Cigarettes   • Smokeless tobacco: Former User   • Alcohol use Yes      Comment: Occasional   ,   Prescriptions Prior to Admission   Medication Sig Dispense Refill Last Dose   • apixaban (ELIQUIS) 5 MG tablet tablet Take 1 tablet by mouth 2 (Two) Times a Day. 60 tablet 5 Taking   • Calcium-Vitamin D 600-200 MG-UNIT per tablet Take 1 tablet by mouth 2 (Two) Times a Day.   Taking   • CARTIA  MG 24 hr capsule TAKE 1 CAPSULE EVERY DAY 90 capsule 1 Taking   • diltiazem (TIAZAC) 300 MG 24 hr capsule Take 300 mg by mouth Daily.   Taking   • finasteride (PROSCAR) 5 MG tablet TAKE 1 TABLET DAILY 90 tablet 1 Taking   • folic acid (FOLVITE) 400 MCG tablet Take 400 mcg by mouth Daily.   Taking   • furosemide (LASIX) 40 MG tablet Take 1 tablet by mouth Daily. Needs appt 30 tablet 0 Taking   • HYDROcodone-homatropine (HYCODAN) 5-1.5 MG/5ML syrup Take 5 mL by mouth Every 6 (Six) Hours As Needed for cough. 120 mL 0 Taking   • Magnesium Hydroxide (MILK OF MAGNESIA PO) Take  by mouth As Needed.   Taking   • metoprolol tartrate (LOPRESSOR) 50 MG tablet Take 50 mg by mouth 2 (two) times a day.   Taking   • Multiple Vitamins-Minerals (MULTI COMPLETE) capsule Take 1 tablet by mouth Daily.   Taking   • naproxen (NAPROSYN) 375 MG tablet Take 375 mg by mouth 3 (Three) Times a Day With Meals.    Taking   • Polyethylene Glycol 3350 (MIRALAX PO) Take 17 Units by mouth Every Night.   Taking   • potassium chloride (K-DUR,KLOR-CON) 10 MEQ CR tablet TAKE 1 TABLET EVERY DAY (SUBSTITUTED FOR  K-DUR) 90 tablet 1 Taking   • Probiotic capsule Take 1 capsule by mouth 2 (Two) Times a Day.   Taking   • Pyridoxine HCl (VITAMIN B-6 PO) Take 100 mg by mouth Daily.   Taking   • tamsulosin (FLOMAX) 0.4 MG capsule 24 hr capsule Take 1 capsule by mouth Daily. 90 capsule 1 Taking   • vitamin B-12 (CYANOCOBALAMIN) 1000 MCG tablet Take 1,000 mcg by mouth Daily.   Taking   • vitamin C (ASCORBIC ACID) 500 MG tablet Take 500 mg by mouth Every Night.   Taking   • vitamin E 1000 UNIT capsule Take 400 Units by mouth Daily.   Taking   , Scheduled Meds:    apixaban 5 mg Oral BID   diltiaZEM  mg Oral Q24H   finasteride 5 mg Oral Daily   ipratropium-albuterol 3 mL Nebulization Q4H - RT   metoprolol tartrate 50 mg Oral BID   piperacillin-tazobactam 3.375 g Intravenous Q8H   polyethylene glycol 17 g Oral Daily   tamsulosin 0.4 mg Oral Daily   , Continuous Infusions:    Pharmacy to Dose Zosyn     and PRN Meds:  •  acetaminophen **OR** acetaminophen  •  bisacodyl  •  HYDROcodone-homatropine  •  magnesium hydroxide  •  magnesium sulfate **OR** magnesium sulfate in D5W 1g/100mL (PREMIX) **OR** magnesium sulfate  •  ondansetron  •  Pharmacy to Dose Zosyn  •  potassium chloride **OR** potassium chloride **OR** potassium chloride  •  sodium chloride  •  Insert peripheral IV **AND** sodium chloride    Objective      Vital Signs  Temp:  [97.5 °F (36.4 °C)-97.9 °F (36.6 °C)] 97.9 °F (36.6 °C)  Heart Rate:  [] 103  Resp:  [14-20] 20  BP: (116-148)/(72-90) 116/87    Physical Exam   Constitutional: He is oriented to person, place, and time. He appears well-nourished.   HENT:   Mouth/Throat: Oropharynx is clear and moist.   Eyes: Conjunctivae are normal.   Neck: Neck supple.   Cardiovascular: Normal rate.    Pulmonary/Chest: He is in  respiratory distress. He has wheezes. He has rales.   Abdominal: Soft. Bowel sounds are normal.   Neurological: He is alert and oriented to person, place, and time.   Skin: Skin is warm and dry.   Psychiatric: He has a normal mood and affect.       Results Review:    I reviewed the patient's new clinical results.  I reviewed the patient's new imaging results and agree with the interpretation.        Assessment/Plan     Active Problems:    Sepsis      Assessment:  (GERD  He likely has reflux, probable laryngeal reflux.  I suspect there is element of esophageal dysmotility given his age. ).     Plan:   (Will order a barium esophagram to further delineate anatomy  He is not a candidate for endoscopy or antireflux surgery  Given his poor prognosis, would consider long term BID ppi and observation. ).       I discussed the patients findings and my recommendations with patient and nursing staff    Roshan Kumar MD  03/22/17  7:42 AM    Time: Critical care 18 min

## 2017-03-22 NOTE — PLAN OF CARE
Problem: Respiratory Insufficiency (Adult)  Goal: Identify Related Risk Factors and Signs and Symptoms  Outcome: Ongoing (interventions implemented as appropriate)  Goal: Acid/Base Balance  Outcome: Ongoing (interventions implemented as appropriate)  Goal: Effective Ventilation  Outcome: Ongoing (interventions implemented as appropriate)    Problem: Fall Risk (Adult)  Goal: Identify Related Risk Factors and Signs and Symptoms  Outcome: Ongoing (interventions implemented as appropriate)  Goal: Absence of Falls  Outcome: Ongoing (interventions implemented as appropriate)

## 2017-03-22 NOTE — PLAN OF CARE
Problem: Patient Care Overview (Adult)  Goal: Plan of Care Review  Outcome: Ongoing (interventions implemented as appropriate)    03/21/17 2002   Coping/Psychosocial Response Interventions   Plan Of Care Reviewed With patient;spouse   Patient Care Overview   Progress improving   Outcome Evaluation   Outcome Summary/Follow up Plan VSS, ambulated well around nursing station today. Patient remains in good spirit, has very good appetite. Some SOA noted upon exertion but reminded to take his time with activities. Weaned O2 to 6 L HF NC today, no c/o of pain or nausea. Consulted GI per ST recommendation after Video swallow, also consulted Hematology/ oncology since patient was supposed to receive chemo on 3/20/17. Waiting on bed on tele floor.         Problem: Respiratory Insufficiency (Adult)  Goal: Identify Related Risk Factors and Signs and Symptoms  Outcome: Ongoing (interventions implemented as appropriate)  Goal: Acid/Base Balance  Outcome: Ongoing (interventions implemented as appropriate)  Goal: Effective Ventilation  Outcome: Ongoing (interventions implemented as appropriate)    Problem: Fall Risk (Adult)  Goal: Identify Related Risk Factors and Signs and Symptoms  Outcome: Ongoing (interventions implemented as appropriate)  Goal: Absence of Falls  Outcome: Ongoing (interventions implemented as appropriate)

## 2017-03-23 NOTE — PROGRESS NOTES
LOS: 4 days       Chief Complaint: Metastatic non-small cell lung cancer receiving palliative chemotherapy admitted with aspiration pneumonia.        Interval History: Today, the patient is doing very well, sitting up in the chair in no distress.  He is down to 5 L O2 nasal cannula (on 3 L at home).  He has no complaints today.  He has been able to get up and ambulate.      Review of Systems:    Review of systems was obtained with pertinent positive findings as noted in the interval history.  All other systems negative.    Objective     Vital Signs  Temp:  [97.5 °F (36.4 °C)-97.7 °F (36.5 °C)] 97.5 °F (36.4 °C)  Heart Rate:  [] 105  Resp:  [18-24] 20  BP: (142-155)/(83-96) 142/83        Physical Exam:     GENERAL:  Elderly gentleman, no apparent distress  SKIN:  Warm, dry without rashes, purpura or petechiae.  CHEST:  A few scattered crackles in the lung bases bilaterally  CARDIAC:  Irregularly irregular  ABDOMEN:  Soft, nontender with no organomegaly or masses.  EXTREMITIES: Chronic 1+ bilateral lower extremity edema with venous stasis changes noted.  NEUROLOGICAL:  Cranial Nerves II-XII grossly intact.  No focal neurological deficits.  PSYCHIATRIC:  Normal affect and mood.           Results Review:     I reviewed the patient's new clinical results.      Results from last 7 days  Lab Units 03/23/17  0340   WBC 10*3/mm3 10.49   HEMOGLOBIN g/dL 10.9*   HEMATOCRIT % 33.8*   PLATELETS 10*3/mm3 198     Lab Results   Component Value Date    NEUTROABS 8.40 (H) 03/23/2017       Results from last 7 days  Lab Units 03/23/17  0340   SODIUM mmol/L 138   POTASSIUM mmol/L 4.3   CHLORIDE mmol/L 99   TOTAL CO2 mmol/L 26.1   BUN mg/dL 16   CREATININE mg/dL 1.42*   GLUCOSE mg/dL 99   CALCIUM mg/dL 9.3       Results from last 7 days  Lab Units 03/19/17  0431   INR  1.33*     Additional labs 3/22/17: Iron 32, ferritin 659, iron saturation 12%, TIBC 267, folate greater than 20, B12 1257.          Medication Review: Yes      Assessment/Plan      1. Stage IV ([id number]) non-small cell lung cancer (adenocarcinoma, ALK gene rearrangement, EGFR mutation negative and PDL 1 negative) involving lung, liver, bone, and brain on palliative chemotherapy with single agent carboplatin 3/4 weeks. Status post previous stereotactic radiation to solitary brain metastasis right occipital lobe 12/8/16. Status post palliative radiation left posterior chest completed in February 2017. Carboplatin was held for 2 weeks due to myelosuppression with initially thrombocytopenia and subsequently neutropenia. He did resume treatment on 3/13/17. He is now admitted with by basilar pneumonia that will be discussed below. He did undergo repeat imaging to assess the status of his disease on 3/17/17. MRI of the brain showed response to prior stereotactic radiation with decrease in size of the lesion and surrounding edema in the right occipital lobe with no new sites of disease. CT of the chest showed stability of the bilateral pulmonary nodules. CT abdomen and pelvis showed the 2 hepatic metastatic lesions to be stable and also showed stability of bone metastases. With evidence of stable disease, we will discuss resuming chemotherapy when he returns to the office to see Dr. Garcia. Chemotherapy was due 3/20/17 but at this point is on hold due to infection.  2. Bibasilar pneumonia: The patient is currently receiving Zosyn for suspected aspiration pneumonia. He has had fever, leukocytosis, and acute hypoxic respiratory failure. He is responding to antibiotics well and is oxygen requirement has decreased.  WBC improved to 10.5 today.  He remains afebrile.  Anticipate discharge soon.  3. Aspiration with esophageal diverticulum: Barium swallow confirms a diverticulum on the right side of the distal thoracic esophagus with retained food material and debris, likely the source of his aspiration.  There is also poor peristalsis.    4. Stage III chronic kidney disease: The  patient's creatinine is actually below his baseline with recent hydration. He is status post previous right nephrectomy in 2013.  5. 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 He has an IVC filter in place. He is not experienced any bleeding difficulties.  6. Anemia: The patient had actually maintained a hemoglobin in the 11-12 range even on chemotherapy and with underlying stage III chronic kidney disease.  His hemoglobin declined to 10.7 reflective of his current inflammatory state with pneumonia in the setting of ongoing chemotherapy and underlying malignancy.  We did check an anemia evaluation (iron panel, ferritin, B12, folate) oh deficiencies identified.  Today, hemoglobin back to 10.9.    Patient being discharged today, will arrange follow-up in the office next week with Dr. Eugene to discuss timing to resume palliative single agent carboplatin (will have potential treatment scheduled on the day of the visit)        Reza Summers MD  03/23/17  12:27 PM

## 2017-03-23 NOTE — DISCHARGE SUMMARY
PHYSICIAN DISCHARGE SUMMARY                                                                        [facility]    Date of admit: 3/19/2017  Date of Discharge:  3/23/2017    PCP: Elmo Aguilera Jr., MD    Discharge Diagnosis:   Active Problems:    Sepsis      Secondary Diagnoses:  Past Medical History:   Diagnosis Date   • Aftercare following surgery    • Anemia     h/o multifactorial anemia   • Atrial fibrillation    • Benign prostatic hypertrophy    • CAD (coronary artery disease)    • Chronic kidney disease     STAGE III   • Fatigue    • H/O thrombocytopenia    • H/O transfusion of packed red blood cells    • H/O: pneumonia 2013    right lower lobe   • History of gross hematuria    • Hyperlipidemia    • Hypertension    • Pulmonary embolism    • Pulmonary hypertension    • Renal insufficiency syndrome    • Transitional cell carcinoma of kidney 2013    status post nephrectomy   • Urothelial carcinoma     Of the bladder - Underwent transurethral reection w/possible residual tumor in the ureter  with concurrent chemoradiation        Procedures Performed  Barium esophagram  CONCLUSION: Somewhat limited exam but with no demonstrated aspiration.  There is a large lobulated epiphrenic diverticulum protruding from the  right side of the distal thoracic esophagus, with its inferior aspect  located approximately 6 cm above the hiatal herniated esophagogastric  junction. Large amount of food material and debris in the diverticulum  and mid to distal esophagus was intermittently demonstrated to undergo  backflow toward the upper esophagus with episodes of mid to distal  esophageal spasticity. Peristalsis in the esophagus was poor. There is  some spasticity and probable fixed narrowing of caliber of the distal  esophagus immediately above a suspected small sliding hiatal herniation  of the upper margin of the stomach. A barium tablet was  not  administered.         Consults:       Hospital Course  Patient is a 81 y.o. male presented with per H&P  Patient is a 81 y.o. male. I'm asked to admit by Dr. Granger in the emergency room at the request of Dr. Ling oncology.patient awoke about 3 AM with shortness of breath. He was found in the emergency room to have bilateral basilar infiltrates. The patient has a very extensive past medical history he is on noninvasive ventilation and has difficulty talking he also has a little confusion his wife is at bedside have discussed with her and reviewed recent notes in the computer most recently a 3/13/17 note from Dr. Garcia. The patient has a history of right renal cell carcinoma he had a nephrectomy has a history of bladder cancer and now has widely metastatic lung carcinoma with bilateral pulmonary metastases and CNS metastases and cervical spine metastases he has had radiation to his brain and cervical spine. He has been on single agent carboplatin therapy he is due to dose tomorrow. He also has a history of a left atrial clot and is on anticoagulation for this. He does have a prior history of pneumonia, he also has a history of a very large esophageal diverticulum and on a CT scan done 3/17/17 this was full of particulate matter.the wife reports the patient frequently has problems swallowing or he gets choked on liquids or solids they're not aware of any choking yesterday and the patient and wife neither are aware of him having any episode of bad heartburn and reflux her finding any food or gastric material in his throat this evening. He was not ill when he went to bed acutely just woke with shortness of breath.    Patient was admitted with:  ASSESSMENT:   Acute hypoxemic respiratory failure  Acute bilateral pneumonia/pneumonitis with aspiration  Dysphagia  Metastatic adenocarcinoma the lung  Atrial fibrillation with RVR  History left atrial clot  Chronic kidney disease III  BPH  History of renal cell  carcinoma and right nephrectomy  History of bladder cancer  Coronary artery disease  Lactic acidosis: Improved  Sepsis: Improved    Started on antibiotics for pneumonia with signs of aspiration with Zosyn.  Acute hypoxemic respiratory failure with increased oxygen requirement has improved.  He uses 3-4 L supplemental oxygen at home and we are weaning down to that.  Currently 95% on 5 L nasal cannula.  He is eager to go home.  She denies any productive cough or chest pain.  White count is responding to antibiotic and he is not febrile.  Hematology/oncology followed while here.  Gastroenterology was consult as well for reflux and esophageal dysmotility.  They recommended continue PPI.  We'll clear discharge with GI prior to going home.      Condition on Discharge:  Stable.      Vital Signs  Temp:  [97.5 °F (36.4 °C)-97.7 °F (36.5 °C)] 97.5 °F (36.4 °C)  Heart Rate:  [] 105  Resp:  [18-24] 20  BP: (142-155)/(83-96) 142/83    Physical Exam:  General Appearance: no acute distress, appears comfortable  Heart: regular rate and rhythm  Lungs: clear to auscultation bilaterally, respirations unlabored  Abdomen: soft, non-tender, non-distended, no guarding/rebound, nl BS  Extremeties: no edema, no cyanosis  Neurology: speech normal, mental status normal, moving all four extremeties  Mental Status: alert, oriented    Discharge Disposition  Home or Self Care    Discharge Medications   Mendelsberg, Alexander S   Home Medication Instructions [id number]    Printed on:03/23/17 1143   Medication Information                      amoxicillin-clavulanate (AUGMENTIN) 875-125 MG per tablet  Take 1 tablet by mouth 2 (Two) Times a Day for 14 days.             apixaban (ELIQUIS) 5 MG tablet tablet  Take 1 tablet by mouth 2 (Two) Times a Day.             Calcium-Vitamin D 600-200 MG-UNIT per tablet  Take 1 tablet by mouth 2 (Two) Times a Day.             CARTIA  MG 24 hr capsule  TAKE 1 CAPSULE EVERY DAY              diltiazem (TIAZAC) 300 MG 24 hr capsule  Take 300 mg by mouth Daily.             finasteride (PROSCAR) 5 MG tablet  TAKE 1 TABLET DAILY             folic acid (FOLVITE) 400 MCG tablet  Take 400 mcg by mouth Daily.             furosemide (LASIX) 40 MG tablet  Take 1 tablet by mouth Daily. Needs appt             HYDROcodone-homatropine (HYCODAN) 5-1.5 MG/5ML syrup  Take 5 mL by mouth Every 6 (Six) Hours As Needed for cough.             Magnesium Hydroxide (MILK OF MAGNESIA PO)  Take  by mouth As Needed.             metoprolol tartrate (LOPRESSOR) 50 MG tablet  Take 50 mg by mouth 2 (two) times a day.             Multiple Vitamins-Minerals (MULTI COMPLETE) capsule  Take 1 tablet by mouth Daily.             naproxen (NAPROSYN) 375 MG tablet  Take 375 mg by mouth 3 (Three) Times a Day With Meals.             pantoprazole (PROTONIX) 40 MG EC tablet  Take 1 tablet by mouth 2 (Two) Times a Day.             Polyethylene Glycol 3350 (MIRALAX PO)  Take 17 Units by mouth Every Night.             potassium chloride (K-DUR,KLOR-CON) 10 MEQ CR tablet  TAKE 1 TABLET EVERY DAY (SUBSTITUTED FOR  K-DUR)             Probiotic capsule  Take 1 capsule by mouth 2 (Two) Times a Day.             Pyridoxine HCl (VITAMIN B-6 PO)  Take 100 mg by mouth Daily.             tamsulosin (FLOMAX) 0.4 MG capsule 24 hr capsule  Take 1 capsule by mouth Daily.             vitamin B-12 (CYANOCOBALAMIN) 1000 MCG tablet  Take 1,000 mcg by mouth Daily.             vitamin C (ASCORBIC ACID) 500 MG tablet  Take 500 mg by mouth Every Night.             vitamin E 1000 UNIT capsule  Take 400 Units by mouth Daily.                 Discharge Diet: regular diet    Activity at Discharge:     Follow-up Information     Follow up with Ochoa Marks MD Follow up in 3 week(s).    Specialty:  Pulmonary Disease    Contact information:    [address] [place], [place] [id number] [phone]          See primary care provider, Elmo Aguilera Jr., MD, in 1-2  weeks        Test Results Pending at Discharge   Order Current Status    Blood Culture Preliminary result    Blood Culture Preliminary result           Kana Ball MD  [facility], [facility]  Pulmonary and Critical Care Medicine  03/23/17  11:38 AM    Time: greater than 30 minutes.        Total time spent discharging patient including evaluation,post hospitalization follow up,  medication and post hospitalization instructions and education total time exceeds 30 minutes.

## 2017-03-23 NOTE — PLAN OF CARE
Problem: Patient Care Overview (Adult)  Goal: Plan of Care Review  Outcome: Ongoing (interventions implemented as appropriate)    03/23/17 0123   Coping/Psychosocial Response Interventions   Plan Of Care Reviewed With patient   Patient Care Overview   Progress improving         Problem: Respiratory Insufficiency (Adult)  Goal: Identify Related Risk Factors and Signs and Symptoms  Outcome: Ongoing (interventions implemented as appropriate)    03/23/17 0123   Respiratory Insufficiency   Related Risk Factors (Respiratory Insufficiency) bedrest;obesity;psychosocial factor   Signs and Symptoms (Respiratory Insufficiency) abnormal ABGs;abnormal breath sounds;cough (productive/ineffective)       Goal: Acid/Base Balance  Outcome: Ongoing (interventions implemented as appropriate)    03/23/17 0123   Respiratory Insufficiency (Adult)   Acid/Base Balance making progress toward outcome       Goal: Effective Ventilation  Outcome: Ongoing (interventions implemented as appropriate)    03/23/17 0123   Respiratory Insufficiency (Adult)   Effective Ventilation making progress toward outcome         Problem: Fall Risk (Adult)  Goal: Identify Related Risk Factors and Signs and Symptoms  Outcome: Ongoing (interventions implemented as appropriate)    03/23/17 0123   Fall Risk   Fall Risk: Related Risk Factors age-related changes       Goal: Absence of Falls  Outcome: Ongoing (interventions implemented as appropriate)    03/23/17 0123   Fall Risk (Adult)   Absence of Falls making progress toward outcome

## 2017-03-23 NOTE — PLAN OF CARE
Problem: Patient Care Overview (Adult)  Goal: Plan of Care Review    03/23/17 1033   Patient Care Overview   Progress progress towards functional goals is fair

## 2017-03-23 NOTE — PROGRESS NOTES
Acute Care - Physical Therapy Treatment Note  [facility]     Patient Name: Alexander S Mendelsberg  : 1935  MRN: [id number]  Today's Date: 3/23/2017  Onset of Illness/Injury or Date of Surgery Date: 17          Admit Date: 3/19/2017    Visit Dx:    ICD-10-CM ICD-9-CM   1. Sepsis, due to unspecified organism A41.9 038.9     995.91   2. Pneumonia of both lungs due to infectious organism, unspecified part of lung J18.9 483.8   3. Difficulty walking R26.2 719.7     Patient Active Problem List   Diagnosis   • Anxiety   • Cough   • Chronic kidney disease, stage III (moderate)   • Difficulty breathing   • Fatigue   • Palpitations   • Hypertension   • Impaired fasting glucose   • Arthralgia of hip   • Renal insufficiency   • O2 dependent   • Pain in right knee   • Arthritis of right knee   • Abnormal electrocardiogram   • Coronary arteriosclerosis in native artery   • Chronic atrial fibrillation   • Chronic coronary artery disease   • Shortness of breath   • Status post coronary artery balloon dilation   • Hyperlipidemia   • Adiposity   • Right fascicular block   • Right flank pain   • Renal cell carcinoma of right kidney   • Weight loss   • History of gross hematuria   • H/O thrombocytopenia   • Pulmonary hypertension   • Left atrial thrombus   • Bone metastases   • Brain metastasis   • Malignant neoplasm of overlapping sites of left lung   • Sepsis               Adult Rehabilitation Note       17 1029 17 1353 17 1550    Rehab Assessment/Intervention    Discipline physical therapy assistant  -JW physical therapist  -AR physical therapist  -CH    Document Type therapy note (daily note)  -JW therapy note (daily note)  -AR therapy note (daily note)  -CH    Subjective Information agree to therapy  -JW agree to therapy  -AR agree to therapy  -CH    Patient Effort, Rehab Treatment  good  -AR good  -CH    Symptoms Noted During/After Treatment   none  -CH    Precautions/Limitations fall  precautions;oxygen therapy device and L/min  -JW fall precautions;oxygen therapy device and L/min   6L NC  -AR fall precautions  -    Recorded by [] Ladonna Rolle PTA [AR] Marquita Mena, PT [CH] Ryann Muir, PT    Vital Signs    Intratreatment Heart Rate (beats/min) 130  -      Posttreatment Heart Rate (beats/min) 122  -JW      Pre SpO2 (%) 95  -JW 95  -AR     O2 Delivery Pre Treatment supplemental O2   6  -JW supplemental O2  -AR     Recorded by [] Ladonna Rolle PTA [AR] Marquita Mena PT     Pain Assessment    Pain Assessment No/denies pain  -JW No/denies pain  -AR No/denies pain  -    Recorded by [] Ladonna Rolle PTA [AR] Marquita Mena, PT [CH] Ryann Muir, PT    Cognitive Assessment/Intervention    Current Cognitive/Communication Assessment functional  -JW functional  -AR functional  -    Orientation Status oriented x 4  -JW oriented x 4  -AR oriented x 4  -    Follows Commands/Answers Questions  100% of the time  -% of the time  -    Personal Safety   WNL/WFL  -    Personal Safety Interventions fall prevention program maintained  -  fall prevention program maintained;gait belt;nonskid shoes/slippers when out of bed  -    Recorded by [] Ladonna Rolle PTA [AR] Marquita Mena, PT [CH] Ryann Muir, PT    Bed Mobility, Assessment/Treatment    Bed Mobility, Assistive Device  bed rails;head of bed elevated  -AR     Bed Mob, Supine to Sit, [place] not tested  - minimum assist (75% patient effort)  -AR not tested  -    Bed Mob, Sit to Supine, [place] contact guard assist  - not tested  -AR not tested  -    Bed Mobility, Comment   sitting in chair  -    Recorded by [] Ladonna Rolle PTA [AR] Marquita Mena, PT [CH] Ryann Muir, PT    Transfer Assessment/Treatment    Transfers, Sit-Stand [place] stand by assist  - contact guard assist  -AR verbal cues required;nonverbal cues required (demo/gesture);contact  guard assist;2 person assist required;hand held assist  -    Transfers, Stand-Sit [place] stand by assist  - contact guard assist  -AR verbal cues required;nonverbal cues required (demo/gesture);contact guard assist;2 person assist required;hand held assist  -CH    Recorded by [KAMALA] Ladonna Rolle PTA [AR] Marquita Mena, PT [CH] Ryann Muir, PT    Gait Assessment/Treatment    Gait, [place] Level contact guard assist;1 person + 1 person to manage equipment  -JW contact guard assist  -AR verbal cues required;nonverbal cues required (demo/gesture);contact guard assist;1 person + 1 person to manage equipment  -    Gait, Distance (Feet) 180  -  -  -    Gait, Gait Deviations decreased heel strike;claudio decreased  - decreased heel strike;claudio decreased  -AR claudio decreased;step length decreased;stride length decreased  -    Gait, Safety Issues supplemental O2;step length decreased  - supplemental O2;step length decreased;weight-shifting ability decreased  -AR supplemental O2  -    Gait, Comment  UE support on IV pole  -AR some SOA noted at end of ambulation but O2 sats in low 90s%  -    Recorded by [KAMALA] Ladonna Rolle PTA [AR] Marquita Mena, PT [CH] Ryann Muir, PT    Positioning and Restraints    Pre-Treatment Position sitting in chair/recliner  -JW in bed  -AR sitting in chair/recliner  -    Post Treatment Position bed  -JW --   transport cart  -AR chair  -    In Bed supine;call light within reach;encouraged to call for assist  -      In Chair   sitting;call light within reach;encouraged to call for assist;with family/caregiver  -    Recorded by [KAMALA] Ladonna Rolle PTA [AR] Marquita Mena, PT [CH] Ryann Muir, PT      User Key  (r) = Recorded By, (t) = Taken By, (c) = Cosigned By    Initials Name Effective Dates     Ryann Muir, PT 12/01/15 -     KAMALA Rolle PTA 02/18/16 -     AR Marquita Mena, PT 06/27/16 -                  IP PT Goals       03/20/17 1513          Bed Mobility PT LTG    Bed Mobility PT LTG, Time to Achieve 1 wk  -CH      Bed Mobility PT LTG, Activity Type all bed mobility  -CH      Bed Mobility PT LTG, [place] Level supervision required  -CH      Transfer Training PT LTG    Transfer Training PT LTG, Time to Achieve 1 wk  -CH      Transfer Training PT LTG, Activity Type all transfers  -CH      Transfer Training PT LTG, [place] Level supervision required  -CH      Gait Training PT LTG    Gait Training Goal PT LTG, Time to Achieve 1 wk  -CH      Gait Training Goal PT LTG, [place] Level supervision required  -CH      Gait Training Goal PT LTG, Distance to Achieve 200  -CH        User Key  (r) = Recorded By, (t) = Taken By, (c) = Cosigned By    Initials Name Provider Type    HUANG Muir, PT Physical Therapist          Physical Therapy Education     Title: PT OT SLP Therapies (Done)     Topic: Physical Therapy (Done)     Point: Mobility training (Done)    Learning Progress Summary    Learner Readiness Method Response Comment Documented by Status   Patient Acceptance E VU  JW 03/23/17 1033 Done    Acceptance E VU  AR 03/22/17 1405 Done    Acceptance E,D,TB VU,NR   03/21/17 1602 Done    Acceptance E,TB,D VU,NR   03/20/17 1512 Done               Point: Home exercise program (Done)    Learning Progress Summary    Learner Readiness Method Response Comment Documented by Status   Patient Acceptance E VU  AR 03/22/17 1405 Done               Point: Body mechanics (Done)    Learning Progress Summary    Learner Readiness Method Response Comment Documented by Status   Patient Acceptance E VU  AR 03/22/17 1405 Done    Acceptance E,D,TB VU,NR   03/21/17 1602 Done    Acceptance E,TB,D VU,NR   03/20/17 1512 Done               Point: Precautions (Done)    Learning Progress Summary    Learner Readiness Method Response Comment Documented by Status   Patient Acceptance E VU  AR 03/22/17 1405 Done     Acceptance E,D,TB VU,NR   03/21/17 1602 Done    Acceptance E,TB,D VU,NR   03/20/17 1512 Done                      User Key     Initials Effective Dates Name Provider Type Discipline     12/01/15 -  Ryann Muir, PT Physical Therapist PT     02/18/16 -  Ladonna Rolle, PTA Physical Therapy Assistant PT    AR 06/27/16 -  Marquita Mena, PT Physical Therapist PT                    PT Recommendation and Plan  Anticipated Discharge Disposition: home with home health  Planned Therapy Interventions: balance training, bed mobility training, home exercise program, gait training, patient/family education, transfer training  PT Frequency: daily  Plan of Care Review  Progress: progress towards functional goals is fair          Outcome Measures       03/23/17 1000 03/22/17 1400 03/21/17 1600    How much help from another person do you currently need...    Turning from your back to your side while in flat bed without using bedrails? 3  - 3  -AR 3  -CH    Moving from lying on back to sitting on the side of a flat bed without bedrails? 3  - 3  -AR 3  -CH    Moving to and from a bed to a chair (including a wheelchair)? 3  - 3  -AR 3  -CH    Standing up from a chair using your arms (e.g., wheelchair, bedside chair)? 3  - 3  -AR 3  -CH    Climbing 3-5 steps with a railing? 3  - 3  -AR 2  -CH    To walk in hospital room? 3  - 3  -AR 3  -CH    AM-PAC 6 Clicks Score 18  - 18  -AR 17  -CH    Functional Assessment    Outcome Measure Options   AM-PAC 6 Clicks Basic Mobility (PT)  -      03/20/17 1500          How much help from another person do you currently need...    Turning from your back to your side while in flat bed without using bedrails? 3  -CH      Moving from lying on back to sitting on the side of a flat bed without bedrails? 3  -CH      Moving to and from a bed to a chair (including a wheelchair)? 3  -CH      Standing up from a chair using your arms (e.g., wheelchair, bedside chair)? 3  -       Climbing 3-5 steps with a railing? 2  -CH      To walk in hospital room? 3  -CH      AM-PAC 6 Clicks Score 17  -CH      Functional Assessment    Outcome Measure Options AM-PAC 6 Clicks Basic Mobility (PT)  -        User Key  (r) = Recorded By, (t) = Taken By, (c) = Cosigned By    Initials Name Provider Type     Ryann Muir, PT Physical Therapist    KAMALA Rolle PTA Physical Therapy Assistant    MAGDI Mena, PT Physical Therapist           Time Calculation:         PT Charges       03/23/17 1026          Time Calculation    Start Time 1010  -      Stop Time 1026  -      Time Calculation (min) 16 min  -      PT Received On 03/23/17  -KAMALA      PT - Next Appointment 03/24/17  -        User Key  (r) = Recorded By, (t) = Taken By, (c) = Cosigned By    Initials Name Provider Type     Ladonna Rolle PTA Physical Therapy Assistant          Therapy Charges for Today     Code Description Service Date Service Provider Modifiers Qty    69459455115 HC PT THER PROC EA 15 MIN 3/23/2017 Ladonna Rolle PTA GP 1    31376288258 HC PT THER SUPP EA 15 MIN 3/23/2017 aLdonna Rolle PTA GP 1          PT G-Codes  Outcome Measure Options: AM-PAC 6 Clicks Basic Mobility (PT)    Ladonna Rolle PTA  3/23/2017

## 2017-03-23 NOTE — PROGRESS NOTES
Continued Stay Note  [facility]     Patient Name: Alexander S Mendelsberg  MRN: [id number]  Today's Date: 3/23/2017    Admit Date: 3/19/2017          Discharge Plan       03/23/17 1444    Case Management/Social Work Plan    Plan Home with wife.     Final Note    Final Note Discharged 3/23/17 to home with wife.              Discharge Codes       03/23/17 1444    Discharge Codes    Discharge Codes 01  Discharge to home        Expected Discharge Date and Time     Expected Discharge Date Expected Discharge Time    Mar 23, 2017             Lily Marie RN

## 2017-03-23 NOTE — PLAN OF CARE
Problem: Patient Care Overview (Adult)  Goal: Plan of Care Review  Outcome: Ongoing (interventions implemented as appropriate)    03/23/17 1147   Coping/Psychosocial Response Interventions   Plan Of Care Reviewed With patient   Patient Care Overview   Progress improving   Outcome Evaluation   Outcome Summary/Follow up Plan Patient has not had any complaints today. Ambulated with PT today.       Goal: Adult Individualization and Mutuality  Outcome: Ongoing (interventions implemented as appropriate)  Goal: Discharge Needs Assessment  Outcome: Ongoing (interventions implemented as appropriate)    Problem: Respiratory Insufficiency (Adult)  Goal: Identify Related Risk Factors and Signs and Symptoms  Outcome: Ongoing (interventions implemented as appropriate)  Goal: Acid/Base Balance  Outcome: Ongoing (interventions implemented as appropriate)  Goal: Effective Ventilation  Outcome: Ongoing (interventions implemented as appropriate)    Problem: Fall Risk (Adult)  Goal: Identify Related Risk Factors and Signs and Symptoms  Outcome: Ongoing (interventions implemented as appropriate)  Goal: Absence of Falls  Outcome: Ongoing (interventions implemented as appropriate)

## 2017-04-21 NOTE — PROGRESS NOTES
Subjective     REASON FOR FOLLOW UP: HISTORY OF R KIDNEY CANCER S/P NEPHRECTOMY, HISTORY OF BLADDER CANCER, NOW WITH LUNG NODULES , PATHOLOGY OF LEFT LUNG MASS SHOWS TYPICAL ADENOCARCINOMA OF THE LUNG  EGFR ALK1 PDL-1 negative, CERVICAL SPINE AND BRAIN METASTASIS, BRAIN EDEMA, CLOTH LEFT ATRIUM, ANTICOAGULATION.                                 History of Present Illness   Mr. Mendelsberg is a gisell 81 year old gentleman with the above medical history here today for scheduled Carboplatin. He is here today in company of his wife stating that she continues having left scapular pain; it is difficult for him to tell where the pain is coming from, sometimes movement is able to trigger the pain and sometimes it is associated with cough and associated with shortness of breath.  So far he is not having any modification in his usual degree of shortness of breath and he remains on oxygen. He has not had any hemoptysis and neither purulence in the sputum and neither pleuritic pain.  Sometimes position triggers modification in intensity of the pain and he requires now Norco on a regular basis 3 TO 5  times a day. This is producing some element of bladder outlet obstruction. He remains on Flomax. Urination is ongoing with mild difficulty, no hematuria, no urinary infection. He has no fever and no chills. His appetite is good, his bowel function is normal as long as he takes milk of magnesia.  He is not having any neurological symptomatology.  He has a significant degree of fatigue.  Hematological parameters are now improved . Since the previous visit the patient was admitted to [facility] with pneumonia; this was treated successfully with antibiotics and he has improved from this.  Also he was found to have a diverticula in the lower esophagus, presumed to be related to aspirate, the source of the aspiration pneumonia. Nothing can be done about it.  The patient remains on proton pump inhibitors to minimize acid  reflux.  He is not choking on food and he is very careful in regard to feeding himself and being sure he does not lay in bed immediately after he eats.           Past Medical History:   Diagnosis Date   • Aftercare following surgery    • Anemia     h/o multifactorial anemia   • Atrial fibrillation    • Benign prostatic hypertrophy    • Bone metastasis    • Brain metastasis    • CAD (coronary artery disease)    • Chronic kidney disease     STAGE III   • Fatigue    • H/O thrombocytopenia    • H/O transfusion of packed red blood cells    • H/O: pneumonia 2013    right lower lobe   • History of gross hematuria    • Hyperlipidemia    • Hypertension    • Liver metastasis    • Non-small cell lung cancer     Stage IV   • Pulmonary embolism    • Pulmonary hypertension    • Renal insufficiency syndrome    • Transitional cell carcinoma of kidney 2013    status post nephrectomy   • Urothelial carcinoma     Of the bladder - Underwent transurethral reection w/possible residual tumor in the ureter  with concurrent chemoradiation         Past Surgical History:   Procedure Laterality Date   • BLADDER SURGERY     • CATARACT EXTRACTION     • CHOLECYSTECTOMY      35 years ago   • KIDNEY SURGERY     • NEPHRECTOMY Right 01/2013        Current Outpatient Prescriptions on File Prior to Visit   Medication Sig Dispense Refill   • apixaban (ELIQUIS) 5 MG tablet tablet Take 1 tablet by mouth 2 (Two) Times a Day. 60 tablet 5   • Calcium-Vitamin D 600-200 MG-UNIT per tablet Take 1 tablet by mouth 2 (Two) Times a Day.     • CARTIA  MG 24 hr capsule TAKE 1 CAPSULE EVERY DAY 90 capsule 1   • diltiazem (TIAZAC) 300 MG 24 hr capsule Take 300 mg by mouth Daily.     • finasteride (PROSCAR) 5 MG tablet Take 1 tablet by mouth Daily. 90 tablet 1   • folic acid (FOLVITE) 400 MCG tablet Take 400 mcg by mouth Daily.     • furosemide (LASIX) 40 MG tablet Take 1 tablet by mouth Daily. Needs appt 30 tablet 0   • Magnesium Hydroxide (MILK OF MAGNESIA PO)  Take  by mouth As Needed.     • metoprolol tartrate (LOPRESSOR) 50 MG tablet Take 1 tablet by mouth 2 (Two) Times a Day. 180 tablet 3   • Multiple Vitamins-Minerals (MULTI COMPLETE) capsule Take 1 tablet by mouth Daily.     • pantoprazole (PROTONIX) 40 MG EC tablet Take 1 tablet by mouth 2 (Two) Times a Day. 60 tablet 0   • Polyethylene Glycol 3350 (MIRALAX PO) Take 17 Units by mouth Every Night.     • potassium chloride (K-DUR,KLOR-CON) 10 MEQ CR tablet TAKE 1 TABLET EVERY DAY (SUBSTITUTED FOR  K-DUR) 90 tablet 1   • Probiotic capsule Take 1 capsule by mouth 2 (Two) Times a Day.     • Pyridoxine HCl (VITAMIN B-6 PO) Take 100 mg by mouth Daily.     • tamsulosin (FLOMAX) 0.4 MG capsule 24 hr capsule Take 1 capsule by mouth Daily. 90 capsule 1   • vitamin B-12 (CYANOCOBALAMIN) 1000 MCG tablet Take 1,000 mcg by mouth Daily.     • vitamin C (ASCORBIC ACID) 500 MG tablet Take 500 mg by mouth Every Night.     • vitamin E 1000 UNIT capsule Take 400 Units by mouth Daily.     • [DISCONTINUED] amoxicillin-clavulanate (AUGMENTIN) 875-125 MG per tablet Take 1 tablet by mouth 2 (Two) Times a Day. For pneumonia 8 tablet 0   • [DISCONTINUED] HYDROcodone-homatropine (HYCODAN) 5-1.5 MG/5ML syrup Take 5 mL by mouth Every 6 (Six) Hours As Needed for cough. 120 mL 0   • [DISCONTINUED] naproxen (NAPROSYN) 375 MG tablet Take 375 mg by mouth 3 (Three) Times a Day With Meals.       No current facility-administered medications on file prior to visit.         ALLERGIES:  No Known Allergies     Social History     Social History   • Marital status:      Spouse name: Manuela   • Number of children: N/A   • Years of education: High school     Occupational History   •  [employer]     Retired     Social History Main Topics   • Smoking status: Former Smoker     Packs/day: 1.00     Years: 15.00     Types: Cigarettes   • Smokeless tobacco: Former User   • Alcohol use Yes      Comment: Occasional   • Drug use: No   •  "Sexual activity: Not Asked     Other Topics Concern   • None     Social History Narrative        Family History   Problem Relation Age of Onset   • Cancer Father    • Heart disease Brother    • Hypertension Other          Review of Systems   Constitutional: Positive for fatigue. Negative for activity change, appetite change and fever.   HENT: Negative.    Eyes: Negative.    Respiratory: Positive for shortness of breath (chronic SOB, on oxygen).         See HPI   Cardiovascular: Negative.    Gastrointestinal: Negative.    Endocrine: Negative.    Genitourinary: Negative.    Musculoskeletal: Positive for back pain.        Back pain   Skin: Negative.    Allergic/Immunologic: Negative.    Neurological: Negative.    Hematological: Negative.  Negative for adenopathy. Does not bruise/bleed easily.   Psychiatric/Behavioral: Negative.           Objective     Vitals:    04/21/17 0755   BP: 122/72   Pulse: 100   Resp: 18   Temp: 97.6 °F (36.4 °C)   SpO2: 92%   Weight: 259 lb (117 kg)   Height: 75\" (190.5 cm)   PainSc: 0-No pain     Current Status 4/21/2017   ECOG score 0       Physical Exam    GENERAL:  Elderly male in NAD on oxygen via NC  SKIN:  Warm, dry without rashes, purpura or petechiae.  NECK:  Supple with good range of motion; no thyromegaly  LYMPHATICS:  No cervical, supraclavicular, axillary adenopathy.  CHEST:  Lungs clear to percussion and auscultation with decreased air exchange bilaterally; palpable left flank mass  CARDIAC:  Regular rate and rhythm without murmurs, rubs or gallops. Normal S1,S2. No edema  EXTREMITIES:  No clubbing, cyanosis or edema.  PSYCHIATRIC:  Normal affect and mood.      RECENT LABS:  Results for orders placed or performed in visit on 04/21/17   CBC Auto Differential   Result Value Ref Range    WBC 8.14 4.00 - 10.00 10*3/mm3    RBC 3.86 (L) 4.30 - 5.50 10*6/mm3    Hemoglobin 11.9 (L) 13.5 - 16.5 g/dL    Hematocrit 37.4 (L) 40.0 - 49.0 %    MCV 96.9 83.0 - 97.0 fL    MCH 30.8 27.0 - 33.0 pg "    MCHC 31.8 (L) 32.0 - 35.0 g/dL    RDW 15.0 (H) 11.7 - 14.5 %    RDW-SD 53.6 (H) 37.0 - 49.0 fl    MPV 9.9 8.9 - 12.1 fL    Platelets 183 150 - 375 10*3/mm3    Neutrophil % 74.1 39.0 - 75.0 %    Lymphocyte % 11.8 (L) 20.0 - 49.0 %    Monocyte % 6.5 4.0 - 12.0 %    Eosinophil % 6.6 (H) 1.0 - 5.0 %    Basophil % 0.6 0.0 - 1.1 %    Immature Grans % 0.4 0.0 - 0.5 %    Neutrophils, Absolute 6.03 1.50 - 7.00 10*3/mm3    Lymphocytes, Absolute 0.96 (L) 1.00 - 3.50 10*3/mm3    Monocytes, Absolute 0.53 0.25 - 0.80 10*3/mm3    Eosinophils, Absolute 0.54 (H) 0.00 - 0.36 10*3/mm3    Basophils, Absolute 0.05 0.00 - 0.10 10*3/mm3    Immature Grans, Absolute 0.03 0.00 - 0.03 10*3/mm3    nRBC 0.0 0.0 - 0.0 /100 WBC       Assessment/Plan        1. Metastatic  lung cancer with a primary tumor in the left lung overlapping areas, biopsy TTF-1 positive. Now we have documented that the patient is EGFR negative, ALK-1 negative, PDL-1 negative.  He's status post palliative RT and undergoing palliative single agent carboplatin.  The patient has had a lot of issues in regard to hematological toxicity including neutropenia with no fever and thrombocytopenia with no bleeding.  He remains anticoagulated with Eliquis in the background of thrombus in the left ventricle and left atrium.     So far, no embolic phenomenon.  So far, no clinical bleeding.  The amount of pain that he is having now close to the left scapula is bothersome and makes me wonder about bone metastases.  Palpation of this anatomical site triggers some pain in areas between the spine, the rib cage and the scapula inner edge.      His COPD and his atrial fibrillation remain about the same, he remains on oxygen and he has an element of bladder outlet obstruction associated with the use of narcotic medication.  He remains on Flomax.     Recent episode of pneumonia treated with IV antibiotics and continuation of treatment at home successfully completely.  An esophageal  diverticulum may be the source of the aspiration pneumonia even though on the radiological analysis no obvious aspiration was documented radiologically.      RECOMMENDATIONS:    1.  Patient has been advised to proceed with his single-agent carboplatin today and continue this every 2 weeks depending on hematological parameters. Recent radiological assessment documented stability of his brain metastases, stability of his pulmonary masses and stability of his liver metastases.  For this reason the treatment will continue, again with a single-agent.    2.  I have advised him to continue taking Norco.  He is aware and I made the patient's wife aware I will be the only one prescribing pain medicine for him.  I gave him a prescription for 150 tablets of Norco 5/325, no refills, to take every 4-6h p.r.n. for pain.  They know that this requires a written prescription all the time and if they run out of the medicine we will not fill prescriptions at nighttime or weekends.    3.  Patient will remain on his anticoagulant for his previous history of left atrial and left ventricular clot. Again, no evidence of bleeding phenomenon, no evidence of embolic phenomenon.   4.  He will remain on Milk of Magnesium, MiraLAX and prune juice for combating of narcotic-induced constipation.    5.  He will have blood counts and chemistry profile in 2 and 4 weeks, nurse practitioner in 2 weeks, doctor visit in 4 weeks in order to reassess the status of his disease.    6.  Finally, the patient and wife questioned the need for him to switch chemotherapy regimens. So far I find no need for this, given the radiological stability of his disease and knowing that he has PDL or negative disease, the benefit of Opdivo for example, will be very limited, probably less than 10% category.  Therefore, not too many options left besides Navelbine or Gemzar in the long run.

## 2017-05-22 PROBLEM — Z45.2 FITTING AND ADJUSTMENT OF VASCULAR CATHETER: Status: ACTIVE | Noted: 2017-01-01

## 2017-06-01 NOTE — PROGRESS NOTES
RADIATION THERAPY TREATMENT SUMMARY    DIAGNOSIS:   Malignant neoplasm of overlapping sites of left lung; widely metastatic with painful lesion at [id number] neoplasm of overlapping sites of left lung; widely metastatic with painful lesion at L5    Patient Care Team:  Elmo Aguilera Jr., MD as PCP - General (Family Medicine)  Som Garrett MD as Consulting Physician (Cardiology)  Joo Christensen Jr., MD as Consulting Physician (Urology)  Katelynn Quan MD as Consulting Physician (Radiation Oncology)  Vahid Garcia MD as Consulting Physician (Hematology and Oncology)  Joo Marin MD as Referring Physician (Emergency Medicine)  Ochoa Marks MD as Consulting Physician (Pulmonary Disease)    The patient is a 81 y.o. year old male who has recently completed radiation therapy treatment for the above mentioned diagnosis.     Please see the specifics of the course of treatment below.    Dates of treatment:  5/31/2017.  Treatment Site - C4 L5-SI JTS  Treatment Intent - Palliative  Total Dose in cGy - 800  Number of Treatments - 1  Dose per fraction - See below  Fractions per day - 1 fx/day  Fractions per week - 1 fx/week  Treatment Type - 3 fields , 3D  Energy - 6 MVP  IAdditional comments: - 800cGy X 1 TX: KV-[id number] BEFORE TX AND MD SEE ON TABLE    Tolerance and Toxicities:  he tolerated the treatment well.        F/U plans: The patient has continued follow up set with the [facility] physicians and was leaning toward more supportive care. I have encouraged them to call if I could be of further help or he experienced problems prior to their next MD visit and they were agreeable.

## 2017-06-07 NOTE — PROGRESS NOTES
CBC reviewed with patient.  CBC stable.  Hgb 10.2, Plts 230, ANC 7.82.  Pt c/o weakness and fatigue.  Also c/o back pain.  States he is taking Norco 10mg, but not every 4 hours.  Encouraged patient to take every 4 hours, as directed, and to call if this does not work.  Pt and wife v/u.  Copy of labs given to patient.

## 2017-06-09 PROBLEM — J96.02 ACUTE RESPIRATORY FAILURE WITH HYPOXIA AND HYPERCAPNIA (HCC): Status: ACTIVE | Noted: 2017-01-01

## 2017-06-09 PROBLEM — J96.01 ACUTE RESPIRATORY FAILURE WITH HYPOXIA AND HYPERCAPNIA (HCC): Status: ACTIVE | Noted: 2017-01-01

## 2017-06-09 NOTE — ED PROVIDER NOTES
EMERGENCY DEPARTMENT ENCOUNTER    CHIEF COMPLAINT  Chief Complaint: AMS  History given by: Pt and spouse who provides most Hx.  History limited by: AMS  Room Number: 14/14  PMD: Elmo Aguilera Jr., MD      HPI:  Pt is a 81 y.o. male who presents complaining of AMS. She states he woke up this morning and was slower to respond. Per spouse, pt was weak this morning and unable to get out of bed. She also reports increase SOB and has been shaking in arms and legs. Pt is on 4.5L O2 at home. The patient SOB and symptoms became worse yesterday afternoon.  Pt was placed on 10L nonrebreather in route. Pt is on Eliquis. Patient denies any chest pain.       Duration:  Few hours  Onset: gradual  Timing: constant  Location: n/a  Radiation: n/a  Quality: slower to respond  Intensity/Severity: moderate  Progression: unchanged  Associated Symptoms: weakness, SOB, incontinence  Aggravating Factors: none  Alleviating Factors: none  Previous Episodes: pt is on Eliquis  Treatment before arrival: placed on nonrebreather    PAST MEDICAL HISTORY  Active Ambulatory Problems     Diagnosis Date Noted   • Anxiety 03/31/2016   • Cough 03/31/2016   • Chronic kidney disease, stage III (moderate) 03/31/2016   • Difficulty breathing 03/31/2016   • Fatigue 03/31/2016   • Palpitations 03/31/2016   • Hypertension 03/31/2016   • Impaired fasting glucose 03/31/2016   • Arthralgia of hip 03/31/2016   • Renal insufficiency 03/31/2016   • O2 dependent 06/09/2016   • Pain in right knee 06/09/2016   • Arthritis of right knee 06/09/2016   • Abnormal electrocardiogram 10/17/2012   • Coronary arteriosclerosis in native artery 10/17/2012   • Chronic atrial fibrillation 09/30/2016   • Chronic coronary artery disease 09/30/2016   • Shortness of breath 10/19/2015   • Status post coronary artery balloon dilation 10/19/2015   • Hyperlipidemia 10/17/2012   • Adiposity 10/17/2012   • Right fascicular block 09/30/2016   • Right flank pain 09/30/2016   • Renal cell  carcinoma of right kidney 09/30/2016   • Weight loss 09/30/2016   • History of gross hematuria    • H/O thrombocytopenia    • Pulmonary hypertension    • Left atrial thrombus 11/10/2016   • Bone metastases 11/16/2016   • Brain metastasis 11/16/2016   • Malignant neoplasm of overlapping sites of left lung 11/16/2016   • Sepsis 03/19/2017   • Fitting and adjustment of vascular catheter 05/22/2017     Resolved Ambulatory Problems     Diagnosis Date Noted   • Abnormal laboratory test result 03/31/2016   • Abnormal weight gain 03/31/2016   • Anemia 03/31/2016   • Arthritis 03/31/2016   • Atrial fibrillation 03/31/2016   • Atrial fibrillation    • Multiple lung nodules on CT 11/02/2016   • Brain edema 11/16/2016     Past Medical History:   Diagnosis Date   • Aftercare following surgery    • Anemia    • Atrial fibrillation    • Benign prostatic hypertrophy    • Bone metastasis    • Brain metastasis    • CAD (coronary artery disease)    • Chronic kidney disease    • Fatigue    • H/O thrombocytopenia    • H/O transfusion of packed red blood cells    • H/O: pneumonia 2013   • History of gross hematuria    • Hyperlipidemia    • Hypertension    • Liver metastasis    • Non-small cell lung cancer    • Pulmonary embolism    • Pulmonary hypertension    • Renal insufficiency syndrome    • Transitional cell carcinoma of kidney 2013   • Urothelial carcinoma        PAST SURGICAL HISTORY  Past Surgical History:   Procedure Laterality Date   • BLADDER SURGERY     • CATARACT EXTRACTION     • CHOLECYSTECTOMY      35 years ago   • KIDNEY SURGERY     • NEPHRECTOMY Right 01/2013       FAMILY HISTORY  Family History   Problem Relation Age of Onset   • Cancer Father    • Heart disease Brother    • Hypertension Other        SOCIAL HISTORY  Social History     Social History   • Marital status:      Spouse name: Manuela   • Number of children: N/A   • Years of education: High school     Occupational History   •  [employer]     Retired     Social History Main Topics   • Smoking status: Former Smoker     Packs/day: 1.00     Years: 15.00     Types: Cigarettes   • Smokeless tobacco: Former User   • Alcohol use Yes      Comment: Occasional   • Drug use: No   • Sexual activity: Not on file     Other Topics Concern   • Not on file     Social History Narrative       ALLERGIES  Review of patient's allergies indicates no known allergies.    REVIEW OF SYSTEMS  Review of Systems   Unable to perform ROS: Mental status change       PHYSICAL EXAM  ED Triage Vitals   Temp Heart Rate Resp BP SpO2   06/09/17 1232 06/09/17 1232 06/09/17 1232 06/09/17 1232 06/09/17 1232   98.8 °F (37.1 °C) 80 28 130/74 96 %      Temp src Heart Rate Source Patient Position BP Location FiO2 (%)   -- -- -- -- --              Physical Exam   Constitutional: He appears distressed (He is in moderate respiratory distress. ).   Elderly and frail and acutely on chronically ill appearing.    HENT:   Head: Normocephalic and atraumatic.   Eyes: EOM are normal. Pupils are equal, round, and reactive to light.   Neck: Normal range of motion. Neck supple.   Cardiovascular: Normal rate, regular rhythm and normal heart sounds.    Pulmonary/Chest: Tachypnea noted. He is in respiratory distress. He has wheezes (diffuse). He has rales (bases bilaterally. ). He exhibits no tenderness.   Has a cough and retractions.    Abdominal: Soft. There is no tenderness. There is no rebound and no guarding.   Musculoskeletal: Normal range of motion. He exhibits edema (2+ pedal, bilaterally - chronic).   Neurological: He is alert. He has normal sensation. He is disoriented (to time). He displays weakness (generalized) and tremor (greater to upper than lower extremities, consistent with asterixis).   Disoriented to time. Moves all extremities. No focal weakness.    Skin: Skin is warm and dry.   Psychiatric: Mood and affect normal.   Nursing note and vitals reviewed.      LAB RESULTS  Lab Results (last  24 hours)     Procedure Component Value Units Date/Time    CBC & Differential [[id number]] Collected:  06/09/17 1250    Specimen:  Blood Updated:  06/09/17 1305    Narrative:       The following orders were created for panel order CBC & Differential.  Procedure                               Abnormality         Status                     ---------                               -----------         ------                     CBC Auto Differential[[id number]]        Abnormal            Final result                 Please view results for these tests on the individual orders.    Protime-INR [[id number]]  (Abnormal) Collected:  06/09/17 1250    Specimen:  Blood Updated:  06/09/17 1314     Protime 16.3 (H) Seconds      INR 1.36 (H)    BNP [[id number]]  (Abnormal) Collected:  06/09/17 1250    Specimen:  Blood Updated:  06/09/17 1324     proBNP 4862.0 (H) pg/mL     Narrative:       Among patients with dyspnea, NT-proBNP is highly sensitive for the detection of acute congestive heart failure. In addition NT-proBNP of <300 pg/ml effectively rules out acute congestive heart failure with 99% negative predictive value.    Troponin [[id number]]  (Normal) Collected:  06/09/17 1250    Specimen:  Blood Updated:  06/09/17 1325     Troponin T <0.010 ng/mL     Narrative:       Troponin T Reference Ranges:  Less than 0.03 ng/mL:    Negative for AMI  0.03 to 0.09 ng/mL:      Indeterminant for AMI  Greater than 0.09 ng/mL: Positive for AMI    CBC Auto Differential [[id number]]  (Abnormal) Collected:  06/09/17 1250    Specimen:  Blood Updated:  06/09/17 1305     WBC 12.90 (H) 10*3/mm3      RBC 3.44 (L) 10*6/mm3      Hemoglobin 10.0 (L) g/dL      Hematocrit 33.1 (L) %      MCV 96.2 fL      MCH 29.1 pg      MCHC 30.2 (L) g/dL      RDW 16.7 (H) %      RDW-SD 58.6 (H) fl      MPV 9.4 fL      Platelets 244 10*3/mm3      Neutrophil % 85.1 (H) %      Lymphocyte % 8.1 (L) %      Monocyte % 5.5 %      Eosinophil % 0.6 %      Basophil % 0.2 %      Immature  Grans % 0.5 %      Neutrophils, Absolute 10.98 (H) 10*3/mm3      Lymphocytes, Absolute 1.04 10*3/mm3      Monocytes, Absolute 0.71 10*3/mm3      Eosinophils, Absolute 0.08 10*3/mm3      Basophils, Absolute 0.02 10*3/mm3      Immature Grans, Absolute 0.07 (H) 10*3/mm3     Blood Gas, Arterial [[id number]]  (Abnormal) Collected:  06/09/17 1336    Specimen:  Arterial Blood Updated:  06/09/17 1340     Site Arterial: left brachial     Dimitri's Test N/A     pH, Arterial 7.308 (L) pH units      pCO2, Arterial 81.8 (C) mm Hg       Critical:Notify Dr YOST (09-Jun-17 13:39:25)Read back ok        pO2, Arterial 58.0 (L) mm Hg      HCO3, Arterial 41.0 (H) mmol/L      Base Excess, Arterial 11.9 (H) mmol/L      O2 Saturation Calculated 84.9 (L) %      Barometric Pressure for Blood Gas 748.5 mmHg      Modality HFNC     Flow Rate 8 lpm      Set [facility] Resp Rate 26    Narrative:       Meter: [id number] : [id number] [facility]    Comprehensive Metabolic Panel [[id number]]  (Abnormal) Collected:  06/09/17 1423    Specimen:  Blood Updated:  06/09/17 1453     Glucose 115 (H) mg/dL      BUN 31 (H) mg/dL      Creatinine 1.51 (H) mg/dL      Sodium 134 (L) mmol/L      Potassium 5.6 (H) mmol/L      Chloride 92 (L) mmol/L      CO2 37.5 (H) mmol/L      Calcium 7.7 (L) mg/dL      Total Protein 7.3 g/dL      Albumin 3.50 g/dL      ALT (SGPT) 12 U/L      AST (SGOT) 21 U/L      Alkaline Phosphatase 167 (H) U/L      Total Bilirubin 0.4 mg/dL      eGFR Non African Amer 45 (L) mL/min/1.73      Globulin 3.8 gm/dL      A/G Ratio 0.9 g/dL      BUN/Creatinine Ratio 20.5     Anion Gap 4.5 mmol/L     Narrative:       The MDRD GFR formula is only valid for adults with stable renal function between ages 18 and 70.    BNP [[id number]]  (Abnormal) Collected:  06/09/17 1423    Specimen:  Blood Updated:  06/09/17 1451     proBNP 4938.0 (H) pg/mL     Narrative:       Among patients with dyspnea, NT-proBNP is highly sensitive for the detection of acute  congestive heart failure. In addition NT-proBNP of <300 pg/ml effectively rules out acute congestive heart failure with 99% negative predictive value.    Blood Gas, Arterial [[id number]]  (Abnormal) Collected:  06/09/17 1443    Specimen:  Arterial Blood Updated:  06/09/17 1450     Site Arterial: left brachial     Dimitri's Test N/A     pH, Arterial 7.323 (L) pH units      pCO2, Arterial 77.8 (C) mm Hg       Critical:Notify Dr HAM (09-Jun-17 14:46:22)Read back ok        pO2, Arterial 104.8 (H) mm Hg      HCO3, Arterial 40.4 (H) mmol/L      Base Excess, Arterial 12.0 (H) mmol/L      O2 Saturation Calculated 97.1 %      A-a Gradiant 0.2 mmHg      Barometric Pressure for Blood Gas 747.3 mmHg      Modality BiPAP     FIO2 100 %      Set Tidal Volume 603     Set Mech Resp Rate 24     Rate 24 Breaths/minute     Narrative:       Meter: [id number] : [id number] Juvent[facility] Chandra          I ordered the above labs and reviewed the results    RADIOLOGY  XR Chest 1 View         CT Head Without Contrast    (Results Pending)   CT Chest Without Contrast    (Results Pending)        I ordered the above noted radiological studies. Interpreted by radiologist. Discussed with radiologist (). Reviewed by me in PACS.       PROCEDURES  Critical Care  Performed by: SOREN BRUNO  Authorized by: SOREN BRUNO   Critical care time was exclusive of separately billable procedures and treating other patients.  Critical care was necessary to treat or prevent imminent or life-threatening deterioration of the following conditions: respiratory failure.  Critical care was time spent personally by me on the following activities: blood draw for specimens, development of treatment plan with patient or surrogate, discussions with consultants, interpretation of cardiac output measurements, examination of patient, evaluation of patient's response to treatment, obtaining history from patient or surrogate, ordering and performing treatments and  interventions, ordering and review of laboratory studies, ordering and review of radiographic studies, pulse oximetry, re-evaluation of patient's condition and review of old charts.      Critical Care time of 30 minutes  EKG           EKG time: 1307  Rhythm/Rate: Afib, tachycardia  QRS, axis: Normal   ST and T waves: Diffuse nonspecific ST/T wave changes   Artifact present    Interpreted Contemporaneously by me, independently viewed  Similar compared to prior 3/19/17    EKG           EKG time: 1446  Rhythm/Rate: 106, Afib  Interventricular conduction delay  QRS, axis: RBBB   ST and T waves: diffuse, nonspecific ST/T wave changes    Interpreted Contemporaneously by me, independently viewed  Similar compared to prior 3/19    PROGRESS AND CONSULTS  ED Course     1240  Ordered CMP, troponin, ABG, EKG, BNP, protime-INR, and CXR for further evaluation.     1312  Spoke with pt's spouse. She is aware of pt's condition and cancer in multiple places.     1314  Ordered repeat ABG, CT chest and CT Head for further evaluation..     1317  Ordered proventil for SOB.     1420  Ordered Zosyn for lung infiltrates. Likely has aspiration. Hx of aspiration pneumonia in past. Has had some increase cough with eating and drinking and known esophageal disorder.  Placed a call to Pulmonology and Hematology/Oncology for consult.   Rechecked pt. He is tolerating the BiPAP well. His O2 sats are at 94%. Per spouse, pt has a chronic cough. He is a full code. I spoke with spouse and informed of critical condition and she clarified that she wants to be a full code at this time.     1457  Discussed case with Dr Garcia (Hematology and Oncology)   Reviewed history, exam, results and treatments.  Discussed concerns and plan of care. Dr Garcia agrees that pt has a terminal condition and has sat down with the family to discuss DNR. He states they have denied and pt is still a full code at this moment.      1500  Discussed case with Dr Marks  Reviewed  history, exam, results and treatments.  Discussed concerns and plan of care. Dr Marks accepts pt to be admitted to ICU. DR marks will follow up with Ct scan of head and of chest.       MEDICAL DECISION MAKING  Results were reviewed/discussed with the patient and they were also made aware of online access. Pt also made aware that some labs, such as cultures, will not be resulted during ER visit and follow up with PMD is necessary.     MDM  Number of Diagnoses or Management Options  Acute on chronic anemia, unspecified type:   Acute respiratory failure with hypoxia and hypercapnia:   Aspiration pneumonia of both lungs, unspecified aspiration pneumonia type, unspecified part of lung:   Lung cancer, primary, with metastasis from lung to other sites - brain, liver and bone, unspecified laterality:      Amount and/or Complexity of Data Reviewed  Clinical lab tests: ordered and reviewed (Arterial pH - 7.308  Arterial pCO2 - 81.8  Arterial pO2 - 58  BNP - 4862  WBC - 12.9  Hgb - 10   Troponin<0.010  Potassium - 5.6)  Tests in the radiology section of CPT®: reviewed and ordered (CXR - cardiomegaly, bibasilar infiltrates/atelectasis right greater than left, bilateral pleural effusion, interstitial prominence, pulmonary nodules)  Tests in the medicine section of CPT®: reviewed and ordered (See procedure note for EKG interpretation)  Decide to obtain previous medical records or to obtain history from someone other than the patient: yes  Review and summarize past medical records: yes (Encounter with Dr. Eugene on 5/25:   1. Metastatic lung cancer with a primary tumor in the left lung overlapping areas, biopsy TTF-1 positive. Now we have documented that the patient is EGFR negative, ALK-1 negative, PDL-1 negative. He's status post palliative RT and undergoing palliative single agent carboplatin.  The patient has had a lot of issues in regard to hematological toxicity including neutropenia with no fever and thrombocytopenia with  no bleeding. He remains anticoagulated with Eliquis in the background of thrombus in the left ventricle and left atrium.      So far, no embolic phenomenon. So far, no clinical bleeding. The amount of pain that he is having now close to the left scapula is bothersome and makes me wonder about bone metastases. Palpation of this anatomical site triggers some pain in areas between the spine, the rib cage and the scapula inner edge.      His COPD and his atrial fibrillation remain about the same, he remains on oxygen and he has an element of bladder outlet obstruction associated with the use of narcotic medication. He remains on Flomax.   Recent episode of pneumonia treated with IV antibiotics and continuation of treatment at home successfully completely. An esophageal diverticulum may be the source of the aspiration pneumonia even though on the radiological analysis no obvious aspiration was documented radiologically.          The patient has developed a new pain very severe in the lumbar area in absence of any trauma that makes me to worry about the possibility of a new metastatic disease and even collapse of vertebra in L4-L5 areas. He has no local kyphosis but he has tenderness localized. The patient has no propagation of the pain to any part on his lower extremities. He has no obvious motor or sensory deficit and he has no other sites of skeletal pain. His pain is very making him very incapacitated and the amount of pain medication that he is taking is not enough to control this. He has progression of shortness of breath, more fatigue. White count, hemoglobin and platelets remain stable.      1. I reviewed with the patient and wife and I personally reviewed in the PAC system [facility] the PET scan that documents extensive progression of his disease especially in the bones and also a couple of new places in his liver. Also I reviewed with them the MRI scan of the brain that shows 2 areas of brain  metastasis, small size with minimal if any surrounding edema. Therefore under these circumstances obviously documented progressive disease we advised them the followin. It could be that the patient wants to pursue [facility] care and no further treatment. He is against this at this point unless he further thinks and makes a decision that goes in this direction. I made him understand that life expectancy is limited under the present circumstances and any measure that we will do will be just for palliation of pain more than anything else.  2. The patient has raised the question in regard to Keytruda like they did during the previous visit a week ago. I pointed out to them that at this point probably Opdivo is the only option that we have left. I doubt that this will have a positive impact on him. Nevertheless we will try to deliver this medicine at some point next week and every 2 weeks understanding side effects including autoimmunity that could include encephalitis, myocarditis, pericarditis, pneumonitis, hepatitis, colitis, steroiditis, rash in the skin and arthritis among others.  3. The patient will require Xgeva injection once a month. Zometa will not be utilized given his marginal kidney function.  4. The patient will be seen by radiation oncology in consideration of treatment for his spine metastasis and I am not sure if the brain metastasis needs to be treated under the present circumstances. I am not going to add any steroid to his regimen given the minimal surrounding brain edema that he has at this point.  5. He will remain on the pain medicine as we discussed during the previous week.  6. He will use nausea medicine 2-3 times a day depending on this to minimize nausea induced by narcotics.  7. The patient will be using a walker to minimize any potential for fall especially taking Eliquis.  8. If the patient changes his mind in regard to hospice care he is welcome to do it and we will be glad to stop  all of the treatment and enroll him in hospice care.  9. I will review him back at the second infusion of Opdivo and see how he is doing.  10. The patient will have weekly CBCs, CMP.  11. We are going to measure a TSH today.  12. He will remain on his Eliquis as a form of anticoagulation for his clot in the left ventricle. So far no clinical bleeding and no embolic phenomenon.)           DIAGNOSIS  Final diagnoses:   Acute respiratory failure with hypoxia and hypercapnia   Lung cancer, primary, with metastasis from lung to other sites - brain, liver and bone, unspecified laterality   Aspiration pneumonia of both lungs, unspecified aspiration pneumonia type, unspecified part of lung   Acute on chronic anemia, unspecified type       DISPOSITION  ADMISSION    Discussed treatment plan and reason for admission with pt/family and admitting physician.  Pt/family voiced understanding of the plan for admission for further testing/treatment as needed.         Latest Documented Vital Signs:  As of 3:48 PM  BP- (!) 179/147 HR- 101 Temp- 98.8 °F (37.1 °C) O2 sat- 95%    --  Documentation assistance provided by marycruz Coreas for Dr. Sorto.  Information recorded by the scribe was done at my direction and has been verified and validated by me.            Carla Coreas  06/09/17 1505       Kana Sorto MD  06/09/17 1548       Kana Sorto MD  07/27/17 0808  Critical care of 30 minutes as time spent on critical care was not initially inserted when seen in ED.      Kana Sorto MD  07/27/17 0820

## 2017-06-09 NOTE — H&P
Patient Care Team:  Elmo Aguilera Jr., MD as PCP - General (Family Medicine)  Som Garrett MD as Consulting Physician (Cardiology)  Joo Christensen Jr., MD as Consulting Physician (Urology)  Katelynn Quan MD as Consulting Physician (Radiation Oncology)  Vahid Garcia MD as Consulting Physician (Hematology and Oncology)  Joo Marin MD as Referring Physician (Emergency Medicine)  Ochoa Marks MD as Consulting Physician (Pulmonary Disease)    Chief complaint shortness of breath cough    Subjective     Patient is a 81 y.o. male.   11-year-old white male known to me for a number of years with multiple problems including metastatic adenocarcinoma the lung had COPD with chronic restaurant failure presented the emergency room worsening today with finding of bilateral pneumonia.  He was hypercapnic and hypoxic he was placed on noninvasive ventilation is on now he is talking he did recognize me but he is clearly confused      Review of Systems  He is unable to provide    History  Past Medical History:   Diagnosis Date   • Aftercare following surgery    • Anemia     h/o multifactorial anemia   • Atrial fibrillation    • Benign prostatic hypertrophy    • Bone metastasis    • Brain metastasis    • CAD (coronary artery disease)    • Chronic kidney disease     STAGE III   • Fatigue    • H/O thrombocytopenia    • H/O transfusion of packed red blood cells    • H/O: pneumonia 2013    right lower lobe   • History of gross hematuria    • Hyperlipidemia    • Hypertension    • Liver metastasis    • Non-small cell lung cancer     Stage IV   • Pulmonary embolism    • Pulmonary hypertension    • Renal insufficiency syndrome    • Transitional cell carcinoma of kidney 2013    status post nephrectomy   • Urothelial carcinoma     Of the bladder - Underwent transurethral reection w/possible residual tumor in the ureter  with concurrent chemoradiation      Past Surgical History:   Procedure Laterality  Date   • BLADDER SURGERY     • CATARACT EXTRACTION     • CHOLECYSTECTOMY      35 years ago   • KIDNEY SURGERY     • NEPHRECTOMY Right 01/2013     Family History   Problem Relation Age of Onset   • Cancer Father    • Heart disease Brother    • Hypertension Other      Social History     Social History   • Marital status:      Spouse name: Manuela   • Number of children: N/A   • Years of education: High school     Occupational History   •  [employer]     Retired     Social History Main Topics   • Smoking status: Former Smoker     Packs/day: 1.00     Years: 15.00     Types: Cigarettes   • Smokeless tobacco: Former User   • Alcohol use No   • Drug use: No   • Sexual activity: No     Other Topics Concern   • None     Social History Narrative       Allergies:  Review of patient's allergies indicates no known allergies.    Medications:  Prior to Admission medications    Medication Sig Start Date End Date Taking? Authorizing Provider   apixaban (ELIQUIS) 5 MG tablet tablet Take 1 tablet by mouth 2 (Two) Times a Day. 11/11/16  Yes Cruz Lynn MD   Calcium-Vitamin D 600-200 MG-UNIT per tablet Take 1 tablet by mouth 2 (Two) Times a Day. 2/28/13  Yes Historical Provider, MD   CARTIA  MG 24 hr capsule TAKE 1 CAPSULE EVERY DAY 12/15/16  Yes Sergio rGeen MD   diltiazem (TIAZAC) 300 MG 24 hr capsule Take 300 mg by mouth Daily. 6/11/13  Yes Sergio Green MD   finasteride (PROSCAR) 5 MG tablet Take 1 tablet by mouth Daily. 3/31/17  Yes Elmo Aguilera Jr., MD   folic acid (FOLVITE) 400 MCG tablet Take 400 mcg by mouth Daily. 6/11/13  Yes Historical Provider, MD   furosemide (LASIX) 40 MG tablet Take 1 tablet by mouth Daily. Needs appt 5/17/17  Yes Elmo Aguilera Jr., MD   HYDROcodone-acetaminophen (NORCO)  MG per tablet Take 1 tablet by mouth Every 4 (Four) Hours As Needed for Moderate Pain (4-6). 5/19/17  Yes Vahid Garcia MD   Magnesium Hydroxide (MILK OF MAGNESIA PO) Take  by mouth As  Needed.   Yes Historical Provider, MD   metoprolol tartrate (LOPRESSOR) 50 MG tablet Take 1 tablet by mouth 2 (Two) Times a Day. 3/29/17  Yes Elmo Aguilera Jr., MD   Multiple Vitamins-Minerals (MULTI COMPLETE) capsule Take 1 tablet by mouth Daily. 6/11/13  Yes Historical Provider, MD   ondansetron (ZOFRAN) 8 MG tablet Take 1 tablet by mouth Every 8 (Eight) Hours As Needed for Nausea or Vomiting for up to 60 doses. 5/30/17  Yes LEANN Oscar   pantoprazole (PROTONIX) 40 MG EC tablet Take 1 tablet by mouth 2 (Two) Times a Day. 5/1/17  Yes Elmo Aguilera Jr., MD   Polyethylene Glycol 3350 (MIRALAX PO) Take 17 Units by mouth Every Night. 11/1/16  Yes Historical Provider, MD   potassium chloride (K-DUR,KLOR-CON) 10 MEQ CR tablet TAKE 1 TABLET EVERY DAY (SUBSTITUTED FOR  K-DUR) 2/3/17  Yes Sergio Green MD   Probiotic capsule Take 1 capsule by mouth 2 (Two) Times a Day. 6/11/13  Yes Historical Provider, MD   Pyridoxine HCl (VITAMIN B-6 PO) Take 100 mg by mouth Daily.   Yes Historical Provider, MD   tamsulosin (FLOMAX) 0.4 MG capsule 24 hr capsule Take 1 capsule by mouth Daily. 3/3/17  Yes Selam Nolasco MD   vitamin B-12 (CYANOCOBALAMIN) 1000 MCG tablet Take 1,000 mcg by mouth Daily. 2/28/13  Yes Historical Provider, MD   vitamin C (ASCORBIC ACID) 500 MG tablet Take 500 mg by mouth Every Night. 2/28/13  Yes Historical Provider, MD   vitamin E 1000 UNIT capsule Take 400 Units by mouth Daily. 2/28/13  Yes Historical Provider, MD   amoxicillin-clavulanate (AUGMENTIN) 875-125 MG per tablet Take 1 tablet by mouth 2 (Two) Times a Day. 5/1/17   Elmo Aguilera Jr., MD   Indacaterol-Glycopyrrolate (UTIBRON NEOHALER) 27.5-15.6 MCG capsule Place 1 puff into inhaler and inhale 2 (Two) Times a Day.    Historical Provider, MD       apixaban 5 mg Oral BID   diltiaZEM  mg Oral Q24H   finasteride 5 mg Oral Daily   folic acid 400 mcg Oral Daily   furosemide 40 mg Oral Daily   ipratropium-albuterol 3 mL Nebulization 4x Daily  "- RT   piperacillin-tazobactam 4.5 g Intravenous Q6H   And      tobramycin 7 mg/kg (Ideal) Intravenous Q24H   And      Linezolid 600 mg Intravenous Q12H   metoprolol tartrate 50 mg Oral BID   pantoprazole 40 mg Oral BID   polyethylene glycol 17 g Oral Nightly   potassium chloride 10 mEq Oral Daily   tamsulosin 0.4 mg Oral Daily       lactated ringers 100 mL/hr       Objective     Vital Signs  Temp:  [97.4 °F (36.3 °C)-98.8 °F (37.1 °C)] 97.4 °F (36.3 °C)  Heart Rate:  [] 84  Resp:  [20-31] 20  BP: (119-179)/() 119/75  Body mass index is 32.01 kg/(m^2).  No intake or output data in the 24 hours ending 06/09/17 1711       Flowsheet Rows         First Filed Value    Admission Height  74\" (188 cm) Documented at 06/09/2017 1232    Admission Weight  230 lb (104 kg) Documented at 06/09/2017 1232           Physical Exam:  General Appearance: White male on noninvasive ventilator doesn't look like he is the most comfortable  Eyes: Under tighter clear and anicteric pupils are very small about 2 mm  ENT: Edentulous mouth full of dried yellow secretions  Neck: No adenopathy trachea is midline there is a little bit of jugular distention  Lungs: Coarse rales rhonchi throughout both lung fields sort of a gurgling breath sounds.  He is using accessory muscles some  Cardiac: Irregularly irregular on the monitor looks to be in A. fib  Abdomen: Abdomen is actually pretty soft there are few bowel sounds no palpate masses or organomegaly is not really tympanic  : Not examined  Musc/Skel: He has trace lower extremity edema bilaterally  Skin: No jaundice no petechiae no rashes  Neuro: Patient is awake he is cooperative he is oriented to person and place but he is also easily confused  Extremities/P Vascular: No clubbing cyanosis again trace edema palpable radial dorsalis pedis pulses  MSE: Hard to assess      Labs:    Results from last 7 days  Lab Units 06/09/17  1250 06/07/17  1412   WBC 10*3/mm3 12.90* 9.45   HEMOGLOBIN " g/dL 10.0* 10.2*   PLATELETS 10*3/mm3 244 230         Radiographic Imaging:  Imaging Results (last 24 hours)     Procedure Component Value Units Date/Time    CT Head Without Contrast [[id number]] Collected:  06/09/17 1545     Updated:  06/09/17 1608    Narrative:       CT SCAN HEAD WITHOUT CONTRAST     CLINICAL HISTORY: Bone, brain, and liver metastases. History of renal  cell carcinoma.     CT scan of the head was obtained with 3 mm axial images without the use  of IV contrast.     Comparison is made to prior MRI of the brain dated 05/21/2017.     FINDINGS:     There is a focus of encephalomalacia within the lateral aspect of the  right temporal lobe which measures up to approximately 3.5 x 1.9 cm in  greatest axial dimensions. There is also a subcentimeter focus of  encephalomalacia within the right aspect of the superior portion of the  vermis measuring up to 6 mm in diameter. This was also seen on the prior  exam. On the prior brain MRI, there were subcentimeter metastatic  lesions appreciated within the left occipital lobe, the right cerebellar  hemisphere, and the right occipital lobe. These foci of metastatic  disease are not well delineated on this CT exam. No acute intracranial  abnormality is noted.     The ventricles, sulci, and cisterns are age appropriate. The basal  ganglia and thalami are unremarkable in appearance.     Incidental note is made of some proptosis of both globes. This can be  seen in the setting of thyroid orbitopathy. Correlation with clinical  data is suggested.       Impression:          There is no evidence for acute intracranial pathology.     Incidental note is made of a focus of encephalomalacia within the  lateral aspect right temporal lobe compatible with a chronic infarct  within the right MCA distribution. A small focus of encephalomalacia is  identified within the superior aspect of the right cerebellar vermis  which is compatible with a small chronic infarct within the  superior  cerebellar artery distribution. These findings were both evident on the  prior brain MRI.     There are subcentimeter foci of metastatic lesions appreciated within  the occipital lobes as well as the right cerebellar hemisphere. These  areas of metastatic disease are not well delineated on CT imaging.     These findings were discussed with Dr. Sorto of the emergency room  staff on 06/09/2017 at approximately 3:58 PM.     This report was finalized on 6/9/2017 4:05 PM by Dr. Deshawn Cassidy MD.       CT Chest Without Contrast [[id number]] Collected:  06/09/17 1545     Updated:  06/09/17 1612    Narrative:       CT CHEST WO CONTRAST-, CT ABDOMEN WO CONTRAST-     INDICATIONS: Short of breath, right-sided pain. No cell cancer with  metastatic disease the lungs, brain, liver     TECHNIQUE:     Unenhanced CHEST, ABDOMEN CT     COMPARISON: 5/22/2017           FINDINGS:      Chest CT:     The heart size is enlarged without pericardial effusion. Coronary  arterial calcifications are present. Mediastinal adenopathy is  redemonstrated, with some increase apparent. For example, prevascular  lymph node measuring 1 cm short axis on image 28 of series 1, previously  7 mm; a left paratracheal lymph node measuring 1.6 cm in short axis on  image 42, previously 1.3 cm. Assessment of vascular and mediastinal  structures is limited without intravenous contrast material.     Esophagus is partly fluid-filled patulous.              The airways appear clear.     Minimal to mild bilateral pleural effusions have developed.     The lungs shows interval development of extensive consolidative opacity  in the right lower lung, and to lesser degree left lower lobe. Some of  the previously demonstrated metastatic foci in the lungs are obscured by  consolidative opacity. The right mid lung metastatic focus measuring 2.6  cm on image 53 of series 2 does not appear significantly changed.  Anteriorly, a pleural-based nodule of the right middle  lobe measuring  1.5 cm on image 56 is reviewed, could be a focus of consolidation  potentially a rapidly developing nodule. Likewise, in the lingula, new  nodule is measured 7 mm on image 73 of series 2.     Abdomen CT:     Status post right nephrectomy.     Hypodense lesions in the left hepatic lobe, poorly visualized, not  changed.     Pancreas is thinned.           Otherwise unremarkable unenhanced appearance of the liver, spleen,  adrenal glands, pancreas, left kidney.     Bowel is only partly included. Some loops of upper abdomen mildly  prominent caliber, only partly included. Distal small bowel is  collapsed. Gas and stool are seen in the colon. No free intraperitoneal  gas or free fluid at the levels imaged.     Scattered small mesenteric and para-aortic lymph nodes are seen that are  not significant by size criteria.     Abdominal aorta is not aneurysmal. Aortic and other arterial  calcifications are present. Inferior vena cava filter is noted.        Degenerative changes are seen in the spine. No acute fracture is  identified. Metastatic foci of the bones, for example in the spine,  appear grossly similar to prior exam.       Impression:             1. Development of extensive consolidations in the right lower lung and,  to a lesser degree left lower suggesting pneumonia. New nodular  densities in the bilateral lungs may represent new metastatic foci or  foci of infection. Increase of mediastinal adenopathy. Minimal to mild  pleural effusions.  2.  Some loops of upper abdomen appear mildly prominent caliber, only  partly included. Distal small bowel is collapsed. Appearance could  potentially represent ileus or partial or early small bowel obstruction,  follow-up suggested.  3. Redemonstration of osseous metastatic disease.     Discussed by telephone with Dr. Sorto, and patient's nurse Risa WHITAKER,  at time of interpretation, approximately 1600 on 06/09/2017     This report was finalized on 6/9/2017  4:08 PM by Dr. Sergio Gilbert MD.       XR Chest 1 View [[id number]] Collected:  06/09/17 1456     Updated:  06/09/17 1625    Narrative:       PORTABLE CHEST     HISTORY: Shortness of breath.     An AP view of the chest demonstrates moderate to severe cardiomegaly.  Bibasilar atelectasis/infiltrate is noted more prominent as compared to  03/19/2017 with consolidation at the right lung base. There is increased  density at the left lung base laterally corresponding to the nodule  noted previously. The margins are less distinct as compared to the  previous examination but the nodule may be larger. The right  infrahilar/perihilar nodular density is partially obscured by  atelectasis/infiltrate. Further evaluation with a CT examination of the  chest is suggested.     This report was finalized on 6/9/2017 4:22 PM by Dr. Russell Varela MD.             I reviewed chest x-ray is CT had chest and abdomen.  He does have dense bilateral pneumonia as he has nodular masses some new some rest and previously some enlarged he also has enlarging mediastinal nodes.  There is some distended bowel that could be a partial small bowel obstruction    Assessment/Plan     Impression: #1 bilateral pneumonia he has been in the hospital about 2 months ago he has a history of MRSA and VRE and he is immunocompromised as chemotherapy for his cancer.  I will start him on broad-spectrum antibiotics  #2 acute on chronic hypoxemic and hypercapnic respiratory failure noninvasive ventilator alternating with oxygen as he tolerates the noninvasive ventilator.  I had a nice discussion with his wife we are not can up put him on a invasive ventilator and his disease state and poor prognosis.  #3 COPD severe treat with bronchodilators.  Does not Not sound like he is in a exacerbation threshold for steroids however  #4 cancer history he has had a history of renal cell carcinoma, recurrent transitional cell carcinoma of bladder and metastatic adenocarcinoma  the lung bone and brain involvement patient is recently had palliative radiation therapy to lesions and he is on a OPdivo palliative therapy only.  #5 chronic atrial fibrillation  #6 history of left atrial appendage clot continue anticoagulation  #7 coronary artery disease  #8 chronic kidney disease line #9 hypertension  #10 pulmonary hypertension #11 history of pulmonary emboli  #11 mild hyponatremia will follow  #12 mild hyper kalemia probably related to acidosis hopefully this will improve with treatment of acidosis  #13 anemia mild with history of anemia chronic disease  #14 confusion apparently he's had some level of confusion and this may be related to his CNS metastases and radiation therapy.  He is a little more confused now than his baseline and this may be an acute metabolic encephalopathy. \   #15 some dilated loops about possible partial small bowel obstruction he certainly not a good surgical candidate this could be problems this could even be why he has pneumonia could've aspirated.  I don't think there is any way he could survive surgery his wife agrees right now he is asymptomatic as best I can tell we are just going to observe at this point in time    I discussed with his wife this pneumonia as can be very difficult it's a pretty severe pneumonia he tolerates the BiPAP but I don't know whether he's given a builder clear secretions well with this.  We talked about intubation his disease progress and prognosis is very poor.  She doesn't feel the patient is ready to give up but she doesn't think he would want to be on machines he wouldn't want to be shocked resuscitated if he did die.  The patient is going to be a DO NOT INTUBATE DO NOT RESUSCITATE but we will continue antibiotics BiPAP oxygen and give him a chance to try and get better.  But I also talked with her if he does not get better and he looks like suffering and we can start keeping him comfortable we need to let her know before we do that  however.      Ochoa Marks MD  06/09/17  5:11 PM    Time: I spent about an hour critical care time with the patient and his wife today

## 2017-06-09 NOTE — PROGRESS NOTES
"Pharmacokinetic Consult - Aminoglycoside Dosing (Initial Note)    Alexander S Mendelsberg has been consulted for tobramycin dosing for Bilateral PNA.   Pharmacy dosing per Selina's request.   Dosing method: extended-interval  Level: to be assessed with Nomogram (will order random level 8 hours after first dose)      Relevant clinical data and objective history reviewed:  81 y.o. male 74\" (188 cm)   249 lb 5.4 oz (113 kg)   Ideal body weight: 181 lb 3.5 oz (82.2 kg)  Adjusted ideal body weight: 208 lb 7.5 oz (94.6 kg)        Results from last 7 days  Lab Units 06/09/17  1423 06/07/17  1429   CREATININE mg/dL 1.51* 1.49*       Estimated Creatinine Clearance: 53 mL/min (by C-G formula based on Cr of 1.51).     Lab Results   Component Value Date    WBC 12.90 (H) 06/09/2017     Temp Readings from Last 3 Encounters:   06/09/17 97.4 °F (36.3 °C) (Oral)   05/26/17 97.1 °F (36.2 °C) (Oral)   05/25/17 97.9 °F (36.6 °C) (Oral)       Assessment/Plan  - Chart notes reviewed.  80 yo immune compromised male with history of metastatic cancer and VRE on Piperacillin/Tazobactam, tobramycin and linezolid emipiric for PNA.  - Will start 7mg/kg iv q36h (660 mg per dose)  - Will order tobramycin random level to be drawn 8 hours after dose given to assess dosing interval per Nomogram in aminoglycoside dosing policy.  - Pharmacy will continue to dose and follow daily.    Thanks,  Nallely Gilbert, PharmD  6/9/2017  5:43 PM    "

## 2017-06-09 NOTE — ED NOTES
SOA & weakness that has worsened in the past week. EMS report initial SaO2 80%.      Wendie Gimenez RN  06/09/17 7658

## 2017-06-10 NOTE — PLAN OF CARE
Problem: Inpatient SLP  Goal: Dysphagia- Patient will safely consume diet as per recommendation with no signs/symptoms of aspiration  Outcome: Ongoing (interventions implemented as appropriate)    06/10/17 1142   Safely Consume Diet   Safely Consume Diet- SLP, Outcome goal ongoing

## 2017-06-10 NOTE — THERAPY EVALUATION
Acute Care - Speech Language Pathology   Swallow Initial Evaluation [facility]     Patient Name: Alexander S Mendelsberg  : 1935  MRN: [id number]  Today's Date: 6/10/2017               Admit Date: 2017    Visit Dx:     ICD-10-CM ICD-9-CM   1. Acute respiratory failure with hypoxia and hypercapnia J96.01 518.81    J96.02    2. Lung cancer, primary, with metastasis from lung to other sites - brain, liver and bone, unspecified laterality C34.90 162.9   3. Aspiration pneumonia of both lungs, unspecified aspiration pneumonia type, unspecified part of lung J69.0 507.0   4. Acute on chronic anemia, unspecified type D64.9 285.9   5. Oropharyngeal dysphagia R13.12 787.22     Patient Active Problem List   Diagnosis   • Anxiety   • Cough   • Chronic kidney disease, stage III (moderate)   • Difficulty breathing   • Fatigue   • Palpitations   • Hypertension   • Impaired fasting glucose   • Arthralgia of hip   • Renal insufficiency   • O2 dependent   • Pain in right knee   • Arthritis of right knee   • Abnormal electrocardiogram   • Coronary arteriosclerosis in native artery   • Chronic atrial fibrillation   • Chronic coronary artery disease   • Shortness of breath   • Status post coronary artery balloon dilation   • Hyperlipidemia   • Adiposity   • Right fascicular block   • Right flank pain   • Renal cell carcinoma of right kidney   • Weight loss   • History of gross hematuria   • H/O thrombocytopenia   • Pulmonary hypertension   • Left atrial thrombus   • Bone metastases   • Brain metastasis   • Malignant neoplasm of overlapping sites of left lung   • Sepsis   • Fitting and adjustment of vascular catheter   • Acute respiratory failure with hypoxia and hypercapnia     Past Medical History:   Diagnosis Date   • Aftercare following surgery    • Anemia     h/o multifactorial anemia   • Atrial fibrillation    • Benign prostatic hypertrophy    • Bone metastasis    • Brain metastasis    • CAD (coronary artery disease)     • Chronic kidney disease     STAGE III   • Fatigue    • H/O thrombocytopenia    • H/O transfusion of packed red blood cells    • H/O: pneumonia 2013    right lower lobe   • History of gross hematuria    • Hyperlipidemia    • Hypertension    • Liver metastasis    • Non-small cell lung cancer     Stage IV   • Pulmonary embolism    • Pulmonary hypertension    • Renal insufficiency syndrome    • Transitional cell carcinoma of kidney 2013    status post nephrectomy   • Urothelial carcinoma     Of the bladder - Underwent transurethral reection w/possible residual tumor in the ureter  with concurrent chemoradiation      Past Surgical History:   Procedure Laterality Date   • BLADDER SURGERY     • CATARACT EXTRACTION     • CHOLECYSTECTOMY      35 years ago   • KIDNEY SURGERY     • NEPHRECTOMY Right 01/2013     SPEECH-LANGUAGE PATHOLOGY EVALUATION - SWALLOW  Subjective: The patient was seen on this date for a Clinical Swallow evaluation.  Patient was alert and cooperative.    The patient's history is significant for bilateral pneumonia. Complicated and extensive medical hx including MRSA,VRE,resp failure,COPD,Cancer/chemo,AFIB,CAD,CKD,HTN,pulmonary HTN,pulmonary emboli.   Objective: Textures given included ice chips, thin liquid, nectar thick liquid, puree consistency and mechanical soft consistency.  Assessment: Difficulties were noted with thin liquid and mechanical soft consistency, characterized by coughing with thins and mech soft and difficulty chewing mech soft.  SLP Findings:  Patient presents with moderate oropharyngeal dysphagia, without esophageal component.   Recommendations: Diet Textures: nectar thick liquid, puree consistency food.  Medications should be taken whole with thickened liquids or puree.   Recommended Strategies: Upright for PO. Oral care before breakfast, after all meals and PRN.  Other Recommended Evaluations: Re-evaluation at bedside on Monday.   Dysphagia therapy is recommended. Rationale:  Re-eval swallow.     SWALLOW EVALUATION (last 72 hours)      Swallow Evaluation       06/10/17 1100                Rehab Evaluation    Document Type evaluation  -JJ        Subjective Information agree to therapy;complains of;pain   c/o back pain when SLP raised the head of the bed  -JJ        Patient Effort, Rehab Treatment good  -JJ        Symptoms Noted During/After Treatment other (see comments)   Pt has a baseline cough  -J        General Information    Patient Profile Review yes  -JJ        Subjective Patient Observations very pleasant and cooperative  -JJ        Pertinent History Of Current Problem bilateral pneumonia,extensive medical hx including CA/chemo, COPD,resp failure  -JJ        Current Diet Limitations NPO  -JJ        Precautions/Limitations, Vision WNL  -JJ        Precautions/Limitations, Hearing WNL  -JJ        Prior Level of Function- Communication functional in all spheres  -JJ        Prior Level of Function- Swallowing no diet consistency restrictions  -J        Plans/Goals Discussed With patient  -J        Barriers to Rehab medically complex  -J        Clinical Impression    Patient's Goals For Discharge return to PO diet  -JJ        Family Goals For Discharge family did not state;other (see comments)   No family present  -JJ        SLP Swallowing Diagnosis moderate dysphagia  -JJ        Rehab Potential/Prognosis, Swallowing good, to achieve stated therapy goals  -JJ        Criteria for Skilled Therapeutic Interventions Met skilled criteria for dysphagia intervention met  -JJ        Therapy Frequency PRN  -JJ        Predicted Duration Therapy Interv (days) until discharge  -JJ        SLP Diet Recommendation II - pureed;nectar/syrup-thick liquids  -JJ        Recommended Diagnostics reassess via clinical swallow (non-instrumental exam)  -J        Recommended Feeding/Eating Techniques maintain upright posture during/after eating for 30 mins  -JJ        SLP Rec. for Method of Medication  Administration meds whole with thickened liquid;meds whole in pudding/applesauce  -JJ        Monitor For Signs Of Aspiration cough;gurgly voice;throat clearing;pneumonia  -JJ        Anticipated Discharge Disposition home with assist  -J        Cognitive Assessment/Intervention    Current Cognitive/Communication Assessment impaired  -JJ        Oral Motor Structure and Function    Oral Motor Anatomy and Physiology patient demonstrates anatomy and physiology that is WNL  -JJ        Dentition Assessment missing teeth;teeth are in poor condition;other (see comments)   Pt usually eats with dentures/wife bringing them in  -JJ        Secretion Management other (see comments)   a lot of pharngeal secretions noted when he coughs  -JJ        Mucosal Quality moist, healthy  -JJ        Volitional Swallow no difficulties initiating volitional swallow  -JJ        Volitional Cough no difficulties initiating volitional cough  -JJ        Oral Musculature General Assessment WNL (within normal limits)  -JJ          User Key  (r) = Recorded By, (t) = Taken By, (c) = Cosigned By    Initials Name Effective Dates    J Kerri Capone MS CCC-SLP 04/13/15 -         EDUCATION  The patient has been educated in the following areas:   Dysphagia (Swallowing Impairment) Modified Diet Instruction.    SLP Recommendation and Plan  SLP Swallowing Diagnosis: moderate dysphagia  SLP Diet Recommendation: II - pureed, nectar/syrup-thick liquids  Recommended Feeding/Eating Techniques: maintain upright posture during/after eating for 30 mins  SLP Rec. for Method of Medication Administration: meds whole with thickened liquid, meds whole in pudding/applesauce  Monitor For Signs Of Aspiration: cough, gurgly voice, throat clearing, pneumonia  Recommended Diagnostics: reassess via clinical swallow (non-instrumental exam)  Criteria for Skilled Therapeutic Interventions Met: skilled criteria for dysphagia intervention met  Anticipated Discharge  Disposition: home with assist  Rehab Potential/Prognosis, Swallowing: good, to achieve stated therapy goals  Therapy Frequency: PRN                        IP SLP Goals       06/10/17 1142          Safely Consume Diet    Safely Consume Diet- SLP, Outcome goal ongoing  -JJ        User Key  (r) = Recorded By, (t) = Taken By, (c) = Cosigned By    Initials Name Provider Type    KAUSHAL Capone MS CCC-SLP Speech and Language Pathologist             SLP Outcome Measures (last 72 hours)      SLP Outcome Measures       06/10/17 1100          SLP Outcome Measures    Outcome Measure Used? Adult NOMS  -      FCM Scores    FCM Chosen Swallowing  -      Swallowing FCM Score 4  -J        User Key  (r) = Recorded By, (t) = Taken By, (c) = Cosigned By    Initials Name Effective Dates    KAUSHAL Capone MS CCC-SLP 04/13/15 -            Time Calculation:         Time Calculation- SLP       06/10/17 1145          Time Calculation- SLP    SLP Received On 06/10/17  -J        User Key  (r) = Recorded By, (t) = Taken By, (c) = Cosigned By    Initials Name Provider Type    KAUSHAL Capone MS CCC-SLP Speech and Language Pathologist          Therapy Charges for Today     Code Description Service Date Service Provider Modifiers Qty    [id number] HC ST EVAL ORAL PHARYNG SWALLOW 3 6/10/2017 Kerri Capone MS CCC-SLP GN 1               Kerri Capone MS CCC-SLP  6/10/2017

## 2017-06-10 NOTE — PLAN OF CARE
Problem: Patient Care Overview (Adult)  Goal: Plan of Care Review  Outcome: Ongoing (interventions implemented as appropriate)    06/10/17 0537   Coping/Psychosocial Response Interventions   Plan Of Care Reviewed With patient;spouse   Outcome Evaluation   Outcome Summary/Follow up Plan Patient off BiPAP for most the night, now on 8L highflow. Back pain controlled with lortab. Unable to void, bladder scanned and straight cathed x1. Will continue to monitor.         Problem: Fall Risk (Adult)  Goal: Identify Related Risk Factors and Signs and Symptoms  Outcome: Ongoing (interventions implemented as appropriate)  Goal: Absence of Falls  Outcome: Ongoing (interventions implemented as appropriate)    Problem: Skin Integrity Impairment, Risk/Actual (Adult)  Goal: Identify Related Risk Factors and Signs and Symptoms  Outcome: Outcome(s) achieved Date Met:  06/10/17  Goal: Skin Integrity/Wound Healing  Outcome: Ongoing (interventions implemented as appropriate)    Problem: Respiratory Insufficiency (Adult)  Goal: Identify Related Risk Factors and Signs and Symptoms  Outcome: Outcome(s) achieved Date Met:  06/10/17  Goal: Acid/Base Balance  Outcome: Ongoing (interventions implemented as appropriate)  Goal: Effective Ventilation  Outcome: Ongoing (interventions implemented as appropriate)

## 2017-06-10 NOTE — PROGRESS NOTES
"Pharmacokinetic Consult - Tobramycin Dosing      Alexander S Mendelsberg is on day 2 pharmacy to dose tobramycin for bilateral pneumonia in immunocompromised host.  Dosing method: extended-interval  Goal peak: 16-20 mg/L   Goal trough: <1 mg/L     Admitted with acute on chronic resp failure, has hx of metastatic renal cell carcinoma. Currently on bipap, day 2 zyvox and zosyn 4.5g q8.      Relevant clinical data and objective history reviewed:  81 y.o. male 74\" (188 cm)   249 lb 5.4 oz (113 kg)   Ideal body weight: 181 lb 3.5 oz (82.2 kg)  Adjusted ideal body weight: 208 lb 7.5 oz (94.6 kg)        Results from last 7 days  Lab Units 06/10/17  0200 06/09/17  1423 06/07/17  1429   CREATININE mg/dL 1.56* 1.51* 1.49*       Estimated Creatinine Clearance: 51.3 mL/min (by C-G formula based on Cr of 1.56).  I/O last 3 completed shifts:  In: -   Out: 500 [Urine:500]  Lab Results   Component Value Date    WBC 10.87 (H) 06/10/2017     Temp Readings from Last 3 Encounters:   06/10/17 98.1 °F (36.7 °C)   05/26/17 97.1 °F (36.2 °C) (Oral)   05/25/17 97.9 °F (36.6 °C) (Oral)     Baseline culture/source/susceptibility:   6/9 blood- ng <24h (4/4 sets)    Dose History:  6/9  580 mg at 1905  6/10  0200 random >20 mcg/ml.  0932 random (about 14 hrs post infusion) = 8.5 mcg/ml    Assessment/Plan  1. Random level at 7 hours post-infusion was >20 mcg/ml and not usable for our nomogram. Repeat random at 0926 this am (about 14 hours post infusion) was 8.5 mcg/ml. The current 7 mg/kg q36 dosing regimen will be too high based on these numbers. Will cancel current dose, recheck random level tomorrow am, and likely have to change to conventional dosing.  2. Will obtain serum concentration daily if not already ordered. Pharmacy will continue to follow daily while on an aminoglycoside and adjust as needed.   3. Monitor for signs toxicity including nephro and jose carlos-toxicity.  Thanks for this consult.  Reynaldo Cisneros.D, BCCCP  6/10/2017        "

## 2017-06-10 NOTE — PROGRESS NOTES
"      [facility]         Dr Morrow Sayied   LOS: 1 day   Patient Care Team:  Elmo Aguilera Jr., MD as PCP - General (Family Medicine)  Som Garrett MD as Consulting Physician (Cardiology)  Joo Christensen Jr., MD as Consulting Physician (Urology)  Katelynn Quan MD as Consulting Physician (Radiation Oncology)  Vahid Garcia MD as Consulting Physician (Hematology and Oncology)  Joo Marin MD as Referring Physician (Emergency Medicine)  Ochoa Marks MD as Consulting Physician (Pulmonary Disease)    Chief Complaint: Bilateral pneumonia with acute on chronic respiratory failure in the setting of metastatic renal cell carcinoma    Interval History: Currently on BiPAP and feels somewhat better.  On high flow oxygen 8 L.  Nurse reports coughing with medications.    REVIEW OF SYSTEMS:   No chest pain reported  No nausea vomiting diarrhea reported  Earlier tolerated BiPAP well    Ventilator/Non-Invasive Ventilation Settings     Start     Ordered    06/09/17 1702  . BIPAP; IPAP: 18; EPAP: 6; Breath Rate: 20; Titrate for SPO2: 88%, Greater Than or Equal To  Until Discontinued     Question Answer Comment   . BIPAP    IPAP 18    EPAP 6    Breath Rate 20    Titrate for SPO2 88%    Titrate for SPO2 Greater Than or Equal To        06/09/17 1708    06/09/17 1341  . BIPAP; IPAP: 18; EPAP: 6; Titrate for SPO2: 88% - 92%  Until Discontinued     Question Answer Comment   . BIPAP    IPAP 18    EPAP 6    Titrate for SPO2 88% - 92%        06/09/17 1340            Vital Signs  Temp:  [97.4 °F (36.3 °C)-99.8 °F (37.7 °C)] 98.9 °F (37.2 °C)  Heart Rate:  [] 93  Resp:  [18-31] 18  BP: (100-179)/() 158/74    Intake/Output Summary (Last 24 hours) at 06/10/17 0942  Last data filed at 06/10/17 0000   Gross per 24 hour   Intake                0 ml   Output              500 ml   Net             -500 ml     Flowsheet Rows         First Filed Value    Admission Height  74\" (188 cm) Documented " at 06/09/2017 1232    Admission Weight  230 lb (104 kg) Documented at 06/09/2017 1232          Physical Exam:   General Appearance:    Off BiPAP on high flow oxygen following simple commands    Lungs:     Diminished breath sounds with crackles bilaterally on the bases     Heart:    Regular rhythm and normal rate, normal S1 and S2, no            murmur, no gallop, no rub, no click   Chest Wall:    No abnormalities observed   Abdomen:     Normal bowel sounds, no masses, no organomegaly, soft        non-tender, non-distended, no guarding, no rebound                tenderness   Extremities:   Moves all extremities well, no edema, no cyanosis, no             redness     Results Review:          Results from last 7 days  Lab Units 06/10/17  0200 06/09/17  1423 06/07/17  1429   SODIUM mmol/L 135* 134* 137   POTASSIUM mmol/L 5.2 5.6* 4.7   CHLORIDE mmol/L 93* 92* 93*   TOTAL CO2 mmol/L 32.9* 37.5* 33.5*   BUN mg/dL 29* 31* 31*   CREATININE mg/dL 1.56* 1.51* 1.49*   GLUCOSE mg/dL 104* 115* 130*   CALCIUM mg/dL 7.4* 7.7* 8.3*       Results from last 7 days  Lab Units 06/09/17  1250   TROPONIN T ng/mL <0.010       Results from last 7 days  Lab Units 06/10/17  0200 06/09/17  1250 06/07/17  1412   WBC 10*3/mm3 10.87* 12.90* 9.45   HEMOGLOBIN g/dL 9.2* 10.0* 10.2*   HEMATOCRIT % 30.1* 33.1* 33.4*   PLATELETS 10*3/mm3 200 244 230       Results from last 7 days  Lab Units 06/09/17  1250   INR  1.36*                   Results from last 7 days  Lab Units 06/10/17  0346   PH, ARTERIAL pH units 7.328*   PO2 ART mm Hg 89.7   PCO2, ARTERIAL mm Hg 75.5*   HCO3 ART mmol/L 39.6*       I reviewed the patient's new clinical results.  I personally viewed and interpreted the patient's CXR        Medication Review:     apixaban 5 mg Oral BID   diltiaZEM  mg Oral Q24H   finasteride 5 mg Oral Daily   folic acid 400 mcg Oral Daily   furosemide 40 mg Oral Daily   ipratropium-albuterol 3 mL Nebulization 4x Daily - RT   piperacillin-tazobactam  4.5 g Intravenous Q8H   And      tobramycin 7 mg/kg (Adjusted) Intravenous Q36H   And      Linezolid 600 mg Intravenous Q12H   metoprolol tartrate 50 mg Oral BID   pantoprazole 40 mg Oral BID   polyethylene glycol 17 g Oral Nightly   potassium chloride 10 mEq Oral Daily   tamsulosin 0.4 mg Oral Daily         lactated ringers 100 mL/hr Last Rate: 100 mL/hr (06/09/17 1905)   Pharmacy to Dose tobramycin (NEBCIN)         ASSESSMENT:   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 fib  History of left atrial appendage clot on anticoagulation  Coronary artery disease  Chronic kidney disease  Pulmonary hypertension with history of PE  TME      PLAN:  Continue current antibiotics  De-escalate based on cultures  Wean off BiPAP as tolerated  Wean oxygen down as tolerated  Swallow evaluation  Some urinary retention noted.  We'll place Rose catheter for strict ins and outs  Long discussion with the patient and his wife.  Continue current care.   already a conditional code.  If rest B status gets worse may consider discussions regarding palliative care  Continue ICU core measures      Cristhian Santos MD  06/10/17  9:42 AM      Time: Critical care 35 min

## 2017-06-11 NOTE — PLAN OF CARE
Problem: Patient Care Overview (Adult)  Goal: Plan of Care Review  Outcome: Ongoing (interventions implemented as appropriate)    06/11/17 0429   Coping/Psychosocial Response Interventions   Plan Of Care Reviewed With patient;spouse   Outcome Evaluation   Outcome Summary/Follow up Plan pt did well on 6LO2,no c/o SOA,coughing up thick yellow secretions,hemodynamically stable,urine output fair, hopefully will move out of ICU today   Patient Care Overview   Progress improving         Problem: Fall Risk (Adult)  Goal: Identify Related Risk Factors and Signs and Symptoms  Outcome: Ongoing (interventions implemented as appropriate)    06/11/17 0429   Fall Risk   Fall Risk: Related Risk Factors bladder function altered;confusion/agitation;fear of falling;gait/mobility problems;sleep pattern alteration;environment unfamiliar   Fall Risk: Signs and Symptoms presence of risk factors       Goal: Absence of Falls  Outcome: Ongoing (interventions implemented as appropriate)    06/11/17 0429   Fall Risk (Adult)   Absence of Falls making progress toward outcome         Problem: Skin Integrity Impairment, Risk/Actual (Adult)  Goal: Skin Integrity/Wound Healing  Outcome: Ongoing (interventions implemented as appropriate)    06/11/17 0429   Skin Integrity Impairment, Risk/Actual (Adult)   Skin Integrity/Wound Healing making progress toward outcome         Problem: Respiratory Insufficiency (Adult)  Goal: Acid/Base Balance  Outcome: Ongoing (interventions implemented as appropriate)    06/11/17 0429   Respiratory Insufficiency (Adult)   Acid/Base Balance making progress toward outcome       Goal: Effective Ventilation  Outcome: Ongoing (interventions implemented as appropriate)    06/11/17 0429   Respiratory Insufficiency (Adult)   Effective Ventilation making progress toward outcome

## 2017-06-11 NOTE — PLAN OF CARE
Problem: Patient Care Overview (Adult)  Goal: Plan of Care Review    06/11/17 1350   Coping/Psychosocial Response Interventions   Plan Of Care Reviewed With patient   Outcome Evaluation   Outcome Summary/Follow up Plan Pt admitted with soa, pnuemonia/resp failure, hx lung cancer. Should progress to home level mobility. participated in ex on eval. Plan: advance to sitting, standing.    Patient Care Overview   Progress improving

## 2017-06-11 NOTE — THERAPY EVALUATION
Acute Care - Physical Therapy Initial Evaluation  [facility]     Patient Name: Alexander S Mendelsberg  : 1935  MRN: [id number]  Today's Date: 2017   Onset of Illness/Injury or Date of Surgery Date: 17            Admit Date: 2017     Visit Dx:    ICD-10-CM ICD-9-CM   1. Acute respiratory failure with hypoxia and hypercapnia J96.01 518.81    J96.02    2. Lung cancer, primary, with metastasis from lung to other sites - brain, liver and bone, unspecified laterality C34.90 162.9   3. Aspiration pneumonia of both lungs, unspecified aspiration pneumonia type, unspecified part of lung J69.0 507.0   4. Acute on chronic anemia, unspecified type D64.9 285.9   5. Oropharyngeal dysphagia R13.12 787.22   6. Difficulty walking R26.2 719.7     Patient Active Problem List   Diagnosis   • Anxiety   • Cough   • Chronic kidney disease, stage III (moderate)   • Difficulty breathing   • Fatigue   • Palpitations   • Hypertension   • Impaired fasting glucose   • Arthralgia of hip   • Renal insufficiency   • O2 dependent   • Pain in right knee   • Arthritis of right knee   • Abnormal electrocardiogram   • Coronary arteriosclerosis in native artery   • Chronic atrial fibrillation   • Chronic coronary artery disease   • Shortness of breath   • Status post coronary artery balloon dilation   • Hyperlipidemia   • Adiposity   • Right fascicular block   • Right flank pain   • Renal cell carcinoma of right kidney   • Weight loss   • History of gross hematuria   • H/O thrombocytopenia   • Pulmonary hypertension   • Left atrial thrombus   • Bone metastases   • Brain metastasis   • Malignant neoplasm of overlapping sites of left lung   • Sepsis   • Fitting and adjustment of vascular catheter   • Acute respiratory failure with hypoxia and hypercapnia     Past Medical History:   Diagnosis Date   • Aftercare following surgery    • Anemia     h/o multifactorial anemia   • Atrial fibrillation    • Benign prostatic hypertrophy     • Bone metastasis    • Brain metastasis    • CAD (coronary artery disease)    • Chronic kidney disease     STAGE III   • Fatigue    • H/O thrombocytopenia    • H/O transfusion of packed red blood cells    • H/O: pneumonia 2013    right lower lobe   • History of gross hematuria    • Hyperlipidemia    • Hypertension    • Liver metastasis    • Non-small cell lung cancer     Stage IV   • Pulmonary embolism    • Pulmonary hypertension    • Renal insufficiency syndrome    • Transitional cell carcinoma of kidney 2013    status post nephrectomy   • Urothelial carcinoma     Of the bladder - Underwent transurethral reection w/possible residual tumor in the ureter  with concurrent chemoradiation      Past Surgical History:   Procedure Laterality Date   • BLADDER SURGERY     • CATARACT EXTRACTION     • CHOLECYSTECTOMY      35 years ago   • KIDNEY SURGERY     • NEPHRECTOMY Right 01/2013          PT ASSESSMENT (last 72 hours)      PT Evaluation       06/11/17 1100 06/10/17 1100    Rehab Evaluation    Document Type evaluation  -SP evaluation  -JJ    Subjective Information agree to therapy  -SP agree to therapy;complains of;pain   c/o back pain when SLP raised the head of the bed  -JJ    Patient Effort, Rehab Treatment good  -SP good  -JJ    Symptoms Noted During/After Treatment other (see comments)   sats dec some   -SP other (see comments)   Pt has a baseline cough  -JJ    General Information    Onset of Illness/Injury or Date of Surgery Date 06/09/17  -SP     Pertinent History Of Current Problem Pt admitted with shortness of air. Dx. acute respiratory failure/pneumonia, has primary lung ca with mets   -SP     Precautions/Limitations --   contact   -SP     Plans/Goals Discussed With patient and family  -SP     Barriers to Rehab none identified  -SP     Clinical Impression    Impairments Found (describe specific impairments) gait, locomotion, and balance;aerobic capacity/endurance  -SP     Functional Limitations in Following  Categories (Describe Specific Limitations) self-care  -SP     Rehab Potential good, to achieve stated therapy goals  -SP     Vital Signs    Pre SpO2 (%) 90  -SP     O2 Delivery Pre Treatment supplemental O2  -SP     Intra SpO2 (%) 86  -SP     O2 Delivery Intra Treatment supplemental O2  -SP     Post SpO2 (%) 89  -SP     O2 Delivery Post Treatment supplemental O2  -SP     Cognitive Assessment/Intervention    Current Cognitive/Communication Assessment impaired  -SP impaired  -JJ    Orientation Status oriented to;person;place;time;required verbal cueing (specifiy in comments)  -SP     Follows Commands/Answers Questions 75% of the time;able to follow single-step instructions;needs cueing;needs increased time;needs repetition  -SP     Personal Safety mild impairment  -SP     ROM (Range of Motion)    General ROM no range of motion deficits identified  -SP     General ROM Detail heelcords tight   -SP     MMT (Manual Muscle Testing)    General MMT Assessment upper extremity strength deficits identified;lower extremity strength deficits identified;neck/trunk strength deficits identified  -SP     General MMT Assessment Detail moves extremities against gravity. 3-/5, SLR b, bridges half Rom 2/5   -SP     Bed Mobility, Assessment/Treatment    Bed Mobility, Scoot/Bridge, Wetzel verbal cues required;supervision required;other (see comments)   impaired, weak   -SP     Bed Mobility, Impairments strength decreased  -SP     Therapy Exercises    Bilateral Lower Extremities AROM:;10 reps;supine;ankle pumps/circles;calf stretch;heel raises;SLR   bridges X 5   -SP     Bilateral Upper Extremity AROM:;5 reps;supine;shoulder extension/flexion  -SP     Positioning and Restraints    Pre-Treatment Position in bed  -SP     Post Treatment Position bed  -SP     In Bed encouraged to call for assist;call light within reach;with family/caregiver  -SP       06/09/17 1720 06/09/17 1706    General Information    Equipment Currently Used at Home   walker, chandler;other (see comments)  -MB    Living Environment    Lives With spouse  -MB     Living Arrangements house  -MB     Home Accessibility no concerns  -MB     Stair Railings at Home inside, present on right side;other (see comments)  -MB     Type of Financial/Environmental Concern not enough insurance;unable to afford medication(s)  -MB     Transportation Available car  -MB     Living Environment Comment stairlift instaled in the home today  -MB       User Key  (r) = Recorded By, (t) = Taken By, (c) = Cosigned By    Initials Name Provider Type    KAUSHAL Capone, MS CCC-SLP Speech and Language Pathologist    SP Maria Eugenia Padilla, PT Physical Therapist    MB Risa Zamora, RN Registered Nurse          Physical Therapy Education     Title: PT OT SLP Therapies (Done)     Topic: Physical Therapy (Done)     Point: Mobility training (Done)    Learning Progress Summary    Learner Readiness Method Response Comment Documented by Status   Patient Acceptance D,E NR,VU Ther ex, to help advance mobility. SP 06/11/17 1202 Done   Family Acceptance D,E NR,VU Ther ex, to help advance mobility. SP 06/11/17 1202 Done               Point: Home exercise program (Done)    Learning Progress Summary    Learner Readiness Method Response Comment Documented by Status   Patient Acceptance D,E NR,VU Ther ex, to help advance mobility. SP 06/11/17 1202 Done   Family Acceptance D,E NR,VU Ther ex, to help advance mobility. SP 06/11/17 1202 Done               Point: Body mechanics (Done)    Learning Progress Summary    Learner Readiness Method Response Comment Documented by Status   Patient Acceptance D,E NR,VU Ther ex, to help advance mobility. SP 06/11/17 1202 Done   Family Acceptance D,E NR,VU Ther ex, to help advance mobility.  06/11/17 1202 Done               Point: Precautions (Done)    Learning Progress Summary    Learner Readiness Method Response Comment Documented by Status   Patient Acceptance D,E NR,VU Ther ex, to  help advance mobility.  06/11/17 1202 Done   Family Acceptance D,E NR,VU Ther ex, to help advance mobility.  06/11/17 1202 Done                      User Key     Initials Effective Dates Name Provider Type Discipline    SP 01/09/17 -  Maria Eugenia Padilla, PT Physical Therapist PT                PT Recommendation and Plan  Planned Therapy Interventions: bed mobility training, strengthening, transfer training, gait training  PT Frequency: daily  Plan of Care Review  Plan Of Care Reviewed With: patient  Progress: improving  Outcome Summary/Follow up Plan: Pt admitted with soa, pnuemonia/resp failure, hx lung cancer. Should progress to home level mobility. participated in ex on eval. Plan: advance to sitting, standing.            IP PT Goals       06/11/17 1352          Bed Mobility PT LTG    Bed Mobility PT LTG, Date Established (P)  06/11/17  -SP      Bed Mobility PT LTG, Time to Achieve (P)  1 wk  -SP      Bed Mobility PT LTG, Activity Type (P)  all bed mobility  -SP      Bed Mobility PT LTG, Calvert Level (P)  minimum assist (75% patient effort)  -SP      Transfer Training PT LTG    Transfer Training PT LTG, Date Established (P)  06/11/17  -SP      Transfer Training PT LTG, Time to Achieve (P)  1 wk  -SP      Transfer Training PT LTG, Activity Type (P)  all transfers  -SP      Transfer Training PT LTG, Calvert Level (P)  minimum assist (75% patient effort)  -SP      Gait Training PT LTG    Gait Training Goal PT LTG, Date Established (P)  06/11/17  -SP      Gait Training Goal PT LTG, Time to Achieve (P)  1 wk  -SP      Gait Training Goal PT LTG, Calvert Level (P)  minimum assist (75% patient effort)  -SP      Gait Training Goal PT LTG, Assist Device (P)  walker, rolling  -SP      Gait Training Goal PT LTG, Distance to Achieve (P)  75  -SP        User Key  (r) = Recorded By, (t) = Taken By, (c) = Cosigned By    Initials Name Provider Type    CORRY Padilla, PT Physical Therapist                 Outcome Measures       06/11/17 1300          How much help from another person do you currently need...    Turning from your back to your side while in flat bed without using bedrails? 2  -SP      Moving from lying on back to sitting on the side of a flat bed without bedrails? 2  -SP      Moving to and from a bed to a chair (including a wheelchair)? 2  -SP      Standing up from a chair using your arms (e.g., wheelchair, bedside chair)? 2  -SP      Climbing 3-5 steps with a railing? 1  -SP      To walk in hospital room? 2  -SP      AM-PAC 6 Clicks Score 11  -SP      Functional Assessment    Outcome Measure Options AM-PAC 6 Clicks Basic Mobility (PT)  -SP        User Key  (r) = Recorded By, (t) = Taken By, (c) = Cosigned By    Initials Name Provider Type    CORRY Padilla PT Physical Therapist           Time Calculation:         PT Charges       06/11/17 1353          Time Calculation    Start Time 1045  -SP      Stop Time 1115  -SP      Time Calculation (min) 30 min  -SP      PT Received On 06/11/17  -SP      PT - Next Appointment 06/12/17  -SP        User Key  (r) = Recorded By, (t) = Taken By, (c) = Cosigned By    Initials Name Provider Type    CORRY Padilla PT Physical Therapist          Therapy Charges for Today     Code Description Service Date Service Provider Modifiers Qty    88300245799 HC PT THER PROC EA 15 MIN 6/11/2017 Maria Eugenia Padilla, PT GP 1    85030585135 HC PT EVAL MOD COMPLEXITY 1 6/11/2017 Maria Eugenia Padilla, PT GP 1          PT G-Codes  Outcome Measure Options: AM-PAC 6 Clicks Basic Mobility (PT)      Maria Eugenia Padilla PT  6/11/2017

## 2017-06-11 NOTE — PROGRESS NOTES
"      [facility]         Dr Morrow Sayied   LOS: 2 days   Patient Care Team:  Elmo Aguilera Jr., MD as PCP - General (Family Medicine)  Som Garrett MD as Consulting Physician (Cardiology)  Joo Christensen Jr., MD as Consulting Physician (Urology)  Katelynn Quan MD as Consulting Physician (Radiation Oncology)  Vahid Garcia MD as Consulting Physician (Hematology and Oncology)  Joo Marin MD as Referring Physician (Emergency Medicine)  Ochoa Marks MD as Consulting Physician (Pulmonary Disease)    Chief Complaint: Bilateral pneumonia with acute on chronic respiratory failure in the setting of metastatic renal cell carcinoma    Interval History: Feels better this morning.  Did not use BiPAP last night at all.  Oxygen down to 5-6 L high flow nasal cannula.  Excellent cough.    REVIEW OF SYSTEMS:   No chest pain reported  No nausea vomiting diarrhea reported    Ventilator/Non-Invasive Ventilation Settings     Start     Ordered    06/09/17 1702  . BIPAP; IPAP: 18; EPAP: 6; Breath Rate: 20; Titrate for SPO2: 88%, Greater Than or Equal To  Until Discontinued     Question Answer Comment   . BIPAP    IPAP 18    EPAP 6    Breath Rate 20    Titrate for SPO2 88%    Titrate for SPO2 Greater Than or Equal To        06/09/17 1708    06/09/17 1341  . BIPAP; IPAP: 18; EPAP: 6; Titrate for SPO2: 88% - 92%  Until Discontinued     Question Answer Comment   . BIPAP    IPAP 18    EPAP 6    Titrate for SPO2 88% - 92%        06/09/17 1340        Off BiPAP currently.    Vital Signs  Temp:  [97.3 °F (36.3 °C)-98.1 °F (36.7 °C)] 97.3 °F (36.3 °C)  Heart Rate:  [75-99] 92  Resp:  [16-18] 18  BP: ()/(57-86) 113/65    Intake/Output Summary (Last 24 hours) at 06/11/17 0902  Last data filed at 06/11/17 0400   Gross per 24 hour   Intake             2640 ml   Output             1100 ml   Net             1540 ml     Flowsheet Rows         First Filed Value    Admission Height  74\" (188 cm) " Documented at 06/09/2017 1232    Admission Weight  230 lb (104 kg) Documented at 06/09/2017 1232          Physical Exam:   General Appearance:    Off BiPAP on high flow oxygen following simple commands    Lungs:     Diminished breath sounds with crackles bilaterally on the bases     Heart:    Regular rhythm and normal rate, normal S1 and S2, no            murmur, no gallop, no rub, no click   Chest Wall:    No abnormalities observed   Abdomen:     Normal bowel sounds, no masses, no organomegaly, soft        non-tender, non-distended, no guarding, no rebound                tenderness   Extremities:   Moves all extremities well, no edema, no cyanosis, no             redness     Results Review:          Results from last 7 days  Lab Units 06/11/17  0709 06/10/17  0200 06/09/17  1423   SODIUM mmol/L 131* 135* 134*   POTASSIUM mmol/L 4.8 5.2 5.6*   CHLORIDE mmol/L 90* 93* 92*   TOTAL CO2 mmol/L 33.9* 32.9* 37.5*   BUN mg/dL 26* 29* 31*   CREATININE mg/dL 1.51* 1.56* 1.51*   GLUCOSE mg/dL 119* 104* 115*   CALCIUM mg/dL 7.3* 7.4* 7.7*       Results from last 7 days  Lab Units 06/09/17  1250   TROPONIN T ng/mL <0.010       Results from last 7 days  Lab Units 06/11/17  0709 06/10/17  0200 06/09/17  1250   WBC 10*3/mm3 9.67 10.87* 12.90*   HEMOGLOBIN g/dL 9.6* 9.2* 10.0*   HEMATOCRIT % 31.7* 30.1* 33.1*   PLATELETS 10*3/mm3 243 200 244       Results from last 7 days  Lab Units 06/09/17  1250   INR  1.36*                   Results from last 7 days  Lab Units 06/10/17  0346   PH, ARTERIAL pH units 7.328*   PO2 ART mm Hg 89.7   PCO2, ARTERIAL mm Hg 75.5*   HCO3 ART mmol/L 39.6*       I reviewed the patient's new clinical results.  I personally viewed and interpreted the patient's CXR        Medication Review:     apixaban 5 mg Oral BID   bisacodyl 10 mg Rectal Daily   diltiaZEM  mg Oral Q24H   finasteride 5 mg Oral Daily   folic acid 400 mcg Oral Daily   furosemide 40 mg Oral Daily   ipratropium-albuterol 3 mL Nebulization  4x Daily - RT   piperacillin-tazobactam 4.5 g Intravenous Q8H   And      Linezolid 600 mg Intravenous Q12H   metoprolol tartrate 50 mg Oral BID   pantoprazole 40 mg Oral BID   polyethylene glycol 17 g Oral Nightly   potassium chloride 10 mEq Oral Daily   tamsulosin 0.4 mg Oral Daily         Pharmacy to Dose tobramycin (NEBCIN)        ASSESSMENT:   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 fib  History of left atrial appendage clot on anticoagulation  Coronary artery disease  Chronic kidney disease  Pulmonary hypertension with history of PE  TME      PLAN:  Respiratory status much improved.  Continue to wean oxygen as tolerated  Physical therapy  Continue current antibiotics  De-escalate based on cultures  Wean off BiPAP as tolerated  Swallow evaluation noted.  Diet started.  We will discontinue IV fluids  Some urinary retention noted.  We'll place Rose catheter for strict ins and outs  Long discussion with the patient and his wife.  Continue current care.  Patient already a conditional code.   Transfer out of the ICU if able to tolerate PT      Cristhian Santos MD  06/11/17  9:02 AM

## 2017-06-12 NOTE — PLAN OF CARE
Problem: Patient Care Overview (Adult)  Goal: Plan of Care Review  Outcome: Ongoing (interventions implemented as appropriate)    06/11/17 2019   Coping/Psychosocial Response Interventions   Plan Of Care Reviewed With patient;spouse   Outcome Evaluation   Outcome Summary/Follow up Plan Pt admitted from ICU with SOA, PNU. F/C. PT. KVO fluids and Zosyn. Safety maintained, continue to monitor.    Patient Care Overview   Progress improving       Goal: Discharge Needs Assessment  Outcome: Ongoing (interventions implemented as appropriate)    Problem: Fall Risk (Adult)  Goal: Identify Related Risk Factors and Signs and Symptoms  Outcome: Ongoing (interventions implemented as appropriate)  Goal: Absence of Falls  Outcome: Ongoing (interventions implemented as appropriate)    Problem: Skin Integrity Impairment, Risk/Actual (Adult)  Goal: Skin Integrity/Wound Healing  Outcome: Ongoing (interventions implemented as appropriate)    Problem: Respiratory Insufficiency (Adult)  Goal: Acid/Base Balance  Outcome: Ongoing (interventions implemented as appropriate)  Goal: Effective Ventilation  Outcome: Ongoing (interventions implemented as appropriate)

## 2017-06-12 NOTE — THERAPY EVALUATION
Acute Care - Speech Language Pathology   Swallow Re-Evaluation [facility]     Patient Name: Alexander S Mendelsberg  : 1935  MRN: [id number]  Today's Date: 2017  Onset of Illness/Injury or Date of Surgery Date: 17            Admit Date: 2017    SPEECH-LANGUAGE PATHOLOGY EVALUATION - SWALLOW  Subjective: The patient was seen on this date for a Clinical Swallow evaluation.  Patient was alert and cooperative. Patient was finishing breakfast upon SLP's arrival.The patient's history is significant for COPD, cancer, reflux, esophageal diverticulum.   Objective: Textures given included thin liquid, nectar thick liquid, puree consistency and mechanical soft consistency.  Assessment: Difficulties were noted with thin liquid and mechanical soft consistency, characterized by throat clearing and wet vocal quality. Patient with cough and belching throughout evaluation. Patient with throat clearing and wet/gurgly vocal quality with thin liquids via cup. Patient presented with no signs or symptoms of aspiration during trials of nectar thick liquids and puree consistencies. Patient wearing dentures and with adequate mastication of mechanical soft but coughing was noted, and patient used suction to retreive mechanical soft food from pharynx.  SLP Findings:  Patient presents with moderate pharyngeal dysphagia, with esophageal component.   Recommendations: Diet Textures: nectar thick liquid, puree consistency food.  Medications should be taken whole or crushed with thickened liquids or puree. May have water and ice between meals after oral care, under staff or family supervision and with the recommended strategies for safe swallowing.   Recommended Strategies: Upright for PO and small bites and sips. Oral care before breakfast, after all meals and PRN.  Other Recommended Evaluations: VFSS, if indicated my MD.   Dysphagia therapy may be recommended, pending results of VFSS.         Visit Dx:     ICD-10-CM  ICD-9-CM   1. Acute respiratory failure with hypoxia and hypercapnia J96.01 518.81    J96.02    2. Lung cancer, primary, with metastasis from lung to other sites - brain, liver and bone, unspecified laterality C34.90 162.9   3. Aspiration pneumonia of both lungs, unspecified aspiration pneumonia type, unspecified part of lung J69.0 507.0   4. Acute on chronic anemia, unspecified type D64.9 285.9   5. Oropharyngeal dysphagia R13.12 787.22   6. Difficulty walking R26.2 719.7     Patient Active Problem List   Diagnosis   • Anxiety   • Cough   • Chronic kidney disease, stage III (moderate)   • Difficulty breathing   • Fatigue   • Palpitations   • Hypertension   • Impaired fasting glucose   • Arthralgia of hip   • Renal insufficiency   • O2 dependent   • Pain in right knee   • Arthritis of right knee   • Abnormal electrocardiogram   • Coronary arteriosclerosis in native artery   • Chronic atrial fibrillation   • Chronic coronary artery disease   • Shortness of breath   • Status post coronary artery balloon dilation   • Hyperlipidemia   • Adiposity   • Right fascicular block   • Right flank pain   • Renal cell carcinoma of right kidney   • Weight loss   • History of gross hematuria   • H/O thrombocytopenia   • Pulmonary hypertension   • Left atrial thrombus   • Bone metastases   • Brain metastasis   • Malignant neoplasm of overlapping sites of left lung   • Sepsis   • Fitting and adjustment of vascular catheter   • Acute respiratory failure with hypoxia and hypercapnia     Past Medical History:   Diagnosis Date   • Aftercare following surgery    • Anemia     h/o multifactorial anemia   • Atrial fibrillation    • Benign prostatic hypertrophy    • Bone metastasis    • Brain metastasis    • CAD (coronary artery disease)    • Chronic kidney disease     STAGE III   • Fatigue    • H/O thrombocytopenia    • H/O transfusion of packed red blood cells    • H/O: pneumonia 2013    right lower lobe   • History of gross hematuria    •  Hyperlipidemia    • Hypertension    • Liver metastasis    • Non-small cell lung cancer     Stage IV   • Pulmonary embolism    • Pulmonary hypertension    • Renal insufficiency syndrome    • Transitional cell carcinoma of kidney 2013    status post nephrectomy   • Urothelial carcinoma     Of the bladder - Underwent transurethral reection w/possible residual tumor in the ureter  with concurrent chemoradiation      Past Surgical History:   Procedure Laterality Date   • BLADDER SURGERY     • CATARACT EXTRACTION     • CHOLECYSTECTOMY      35 years ago   • KIDNEY SURGERY     • NEPHRECTOMY Right 01/2013          SWALLOW EVALUATION (last 72 hours)      Swallow Evaluation       06/12/17 1100 06/10/17 1100             Rehab Evaluation    Document Type  evaluation  -JJ       Subjective Information  agree to therapy;complains of;pain   c/o back pain when SLP raised the head of the bed  -JJ       Patient Effort, Rehab Treatment  good  -JJ       Symptoms Noted During/After Treatment  other (see comments)   Pt has a baseline cough  -JJ       General Information    Patient Profile Review yes  -KS yes  -JJ       Subjective Patient Observations  very pleasant and cooperative  -JJ       Pertinent History Of Current Problem  bilateral pneumonia,extensive medical hx including CA/chemo, COPD,resp failure  -JJ       Current Diet Limitations nectar thick liquids;puree  -KS NPO  -JJ       Precautions/Limitations, Vision WFL  -KS WNL  -JJ       Precautions/Limitations, Hearing WFL  -KS WNL  -JJ       Prior Level of Function- Communication  functional in all spheres  -JJ       Prior Level of Function- Swallowing  no diet consistency restrictions  -JJ       Plans/Goals Discussed With  patient  -JJ       Barriers to Rehab medically complex  -KS medically complex  -JJ       Clinical Impression    Patient's Goals For Discharge return to regular diet;eat/drink without coughing/choking  -KS return to PO diet  -JJ       Family Goals For Discharge   family did not state;other (see comments)   No family present  -J       SLP Swallowing Diagnosis pharyngeal dysfunction;esophageal dysfunction  -KS moderate dysphagia  -       Rehab Potential/Prognosis, Swallowing fair, will monitor progress closely  -KS good, to achieve stated therapy goals  -       Criteria for Skilled Therapeutic Interventions Met skilled criteria for dysphagia intervention met  -KS skilled criteria for dysphagia intervention met  -       FCM, Swallowing 4-->Level 4  -KS        Therapy Frequency PRN  -KS PRN  -J       Predicted Duration Therapy Interv (days) until discharge  -KS until discharge  -J       SLP Diet Recommendation III - pureed with some mashed;nectar/syrup-thick liquids  -KS II - pureed;nectar/syrup-thick liquids  -       Recommended Diagnostics VFSS (MBS)   if indicated by MD   -KS reassess via clinical swallow (non-instrumental exam)  -       Recommended Feeding/Eating Techniques  maintain upright posture during/after eating for 30 mins  -       SLP Rec. for Method of Medication Administration meds whole with thickened liquid;meds whole in pudding/applesauce  -KS meds whole with thickened liquid;meds whole in pudding/applesauce  -       Monitor For Signs Of Aspiration cough;gurgly voice;throat clearing;pneumonia  -KS cough;gurgly voice;throat clearing;pneumonia  -       Anticipated Discharge Disposition home with assist  -KS home with assist  -       Pain Assessment    Pain Assessment No/denies pain  -KS        Cognitive Assessment/Intervention    Current Cognitive/Communication Assessment  impaired  -       Oral Motor Structure and Function    Oral Motor Anatomy and Physiology  patient demonstrates anatomy and physiology that is WNL  -       Dentition Assessment missing teeth;teeth are in poor condition;other (see comments)   Wearing dentures.   -KS missing teeth;teeth are in poor condition;other (see comments)   Pt usually eats with dentures/wife  bringing them in  -JJ       Secretion Management requires suctioning to control secretions;problems swallowing secretions  -KS other (see comments)   a lot of pharngeal secretions noted when he coughs  -JJ       Mucosal Quality moist, healthy  -KS moist, healthy  -JJ       Volitional Swallow no difficulties initiating volitional swallow  -KS no difficulties initiating volitional swallow  -JJ       Volitional Cough no difficulties initiating volitional cough  -KS no difficulties initiating volitional cough  -JJ       Oral Musculature General Assessment  WNL (within normal limits)  -JJ         User Key  (r) = Recorded By, (t) = Taken By, (c) = Cosigned By    Initials Name Effective Dates    JJ Kerri Julisa Capone, MS CCC-SLP 04/13/15 -     KS Heather Calderon, CCC-SLP 05/11/17 -         EDUCATION  The patient has been educated in the following areas:   Dysphagia (Swallowing Impairment).    SLP Recommendation and Plan  SLP Swallowing Diagnosis: pharyngeal dysfunction, esophageal dysfunction  SLP Diet Recommendation: III - pureed with some mashed, nectar/syrup-thick liquids     SLP Rec. for Method of Medication Administration: meds whole with thickened liquid, meds whole in pudding/applesauce  Monitor For Signs Of Aspiration: cough, gurgly voice, throat clearing, pneumonia  Recommended Diagnostics: VFSS (MBS) (if indicated by MD )  Criteria for Skilled Therapeutic Interventions Met: skilled criteria for dysphagia intervention met  Anticipated Discharge Disposition: home with assist  Rehab Potential/Prognosis, Swallowing: fair, will monitor progress closely  Therapy Frequency: PRN                        IP SLP Goals       06/12/17 1214 06/12/17 1132 06/10/17 1142    Safely Consume Diet    Safely Consume Diet- SLP, Time to Achieve by discharge  -KS (P)  by discharge  -KS     Safely Consume Diet- SLP, Outcome goal ongoing  -KS (P)  goal ongoing  -KS goal ongoing  -JJ      User Key  (r) = Recorded By, (t) = Taken  By, (c) = Cosigned By    Initials Name Provider Type    KAUSHAL Lopezfer Julisa Capone, MS CCC-SLP Speech and Language Pathologist    PIERRE Tolliver Speech and Language Pathologist             SLP Outcome Measures (last 72 hours)      SLP Outcome Measures       06/10/17 1100          SLP Outcome Measures    Outcome Measure Used? Adult NOMS  -JJ      FCM Scores    FCM Chosen Swallowing  -JJ      Swallowing FCM Score 4  -JJ        User Key  (r) = Recorded By, (t) = Taken By, (c) = Cosigned By    Initials Name Effective Dates    KAUSHAL Manning Julisa Capone MS CCC-SLP 04/13/15 -            Time Calculation:         Time Calculation- SLP       06/12/17 1222          Time Calculation- SLP    SLP Start Time 0915  -KS      SLP Stop Time 1000  -KS      SLP Time Calculation (min) 45 min  -KS        User Key  (r) = Recorded By, (t) = Taken By, (c) = Cosigned By    Initials Name Provider Type    PIERRE Tolliver Speech and Language Pathologist          Therapy Charges for Today     Code Description Service Date Service Provider Modifiers Qty    43449099810 HC ST TREATMENT SWALLOW 3 6/12/2017 PIERRE Gonzalez GN 1               PIERRE Gonzalez  6/12/2017

## 2017-06-12 NOTE — PLAN OF CARE
Problem: Patient Care Overview (Adult)  Goal: Plan of Care Review    06/12/17 0906   Coping/Psychosocial Response Interventions   Plan Of Care Reviewed With patient;spouse   Outcome Evaluation   Outcome Summary/Follow up Plan Pt became SOB easily while performing bed exercises. Pt complaned of pain in L anterior distal shin and ankle. Pt feels weak and feels his SOB and pain limit his mobility. Session ended when he reported breathing problems. Nursing notified and RN in room at end of session.   Patient Care Overview   Progress unable to show any progress toward functional goals

## 2017-06-12 NOTE — THERAPY TREATMENT NOTE
Acute Care - Physical Therapy Treatment Note  [facility]     Patient Name: Alexander S Mendelsberg  : 1935  MRN: [id number]  Today's Date: 2017  Onset of Illness/Injury or Date of Surgery Date: 17          Admit Date: 2017    Visit Dx:    ICD-10-CM ICD-9-CM   1. Acute respiratory failure with hypoxia and hypercapnia J96.01 518.81    J96.02    2. Lung cancer, primary, with metastasis from lung to other sites - brain, liver and bone, unspecified laterality C34.90 162.9   3. Aspiration pneumonia of both lungs, unspecified aspiration pneumonia type, unspecified part of lung J69.0 507.0   4. Acute on chronic anemia, unspecified type D64.9 285.9   5. Oropharyngeal dysphagia R13.12 787.22   6. Difficulty walking R26.2 719.7     Patient Active Problem List   Diagnosis   • Anxiety   • Cough   • Chronic kidney disease, stage III (moderate)   • Difficulty breathing   • Fatigue   • Palpitations   • Hypertension   • Impaired fasting glucose   • Arthralgia of hip   • Renal insufficiency   • O2 dependent   • Pain in right knee   • Arthritis of right knee   • Abnormal electrocardiogram   • Coronary arteriosclerosis in native artery   • Chronic atrial fibrillation   • Chronic coronary artery disease   • Shortness of breath   • Status post coronary artery balloon dilation   • Hyperlipidemia   • Adiposity   • Right fascicular block   • Right flank pain   • Renal cell carcinoma of right kidney   • Weight loss   • History of gross hematuria   • H/O thrombocytopenia   • Pulmonary hypertension   • Left atrial thrombus   • Bone metastases   • Brain metastasis   • Malignant neoplasm of overlapping sites of left lung   • Sepsis   • Fitting and adjustment of vascular catheter   • Acute respiratory failure with hypoxia and hypercapnia               Adult Rehabilitation Note       17 0800 17 1100       Rehab Assessment/Intervention    Discipline --   PT Student  -EE,KYUNG,EE2 physical therapist  -SP      Document Type therapy note (daily note)  -EE,KYUNG,EE2 evaluation  -SP     Subjective Information agree to therapy;complains of;weakness;pain;dyspnea  -EE,KYUNG,EE2 agree to therapy  -SP     Patient Effort, Rehab Treatment good  -EE,KYUNG,EE2 good  -SP     Symptoms Noted During/After Treatment shortness of breath  -EE,KYUNG,EE2 other (see comments)   sats dec some   -SP     Precautions/Limitations  --   contact   -SP     Patient Response to Treatment Pt complained of SOB although sats dropped only slightly.  Pt's pain in left shin/ankle remained during AROM exercises  -EE,KYUNG,EE2      Recorded by [EE,KYUNG,EE2] Soha Brush, PT (r) Sebastian Goncalves, PT Student (t) Soha Brush, PT (c) [SP] Maria Eugenia Padilla, PT     Vital Signs    Pre SpO2 (%) 89  -EE,KYUNG,EE2      O2 Delivery Pre Treatment supplemental O2  -EE,KYUNG,EE2      Intra SpO2 (%) 85  -EE,KYUNG,EE2      O2 Delivery Intra Treatment supplemental O2  -EE,KYUNG,EE2      Post SpO2 (%) 90  -EE,KYUNG,EE2      O2 Delivery Post Treatment supplemental O2  -EE,KYUNG,EE2      Recorded by [EE,KYUNG,EE2] Soha Brush, PT (r) Sebastian Goncalves, PT Student (t) Soha Brush PT (c)      Pain Assessment    Pain Assessment FLACC  -EE,KYUNG,EE2      Pain Location Ankle   and shin  -EE,KYUNG,EE2      Pain Orientation Left;Anterior  -EE,KYUNG,EE2      Pain Frequency Constant/continuous  -EE,KYUNG,EE2      Effect of Pain on Daily Activities Preventing mobility  -EE,KYUNG,EE2      Pain Intervention(s) Ambulation/increased activity  -EE,KYUNG,EE2      Recorded by [EE,KYUNG,EE2] Soha Brush, PT (r) Sebastian Goncalves, PT Student (t) Soha Brush PT (c)      Cognitive Assessment/Intervention    Current Cognitive/Communication Assessment functional  -EE,KYUNG,EE2 impaired  -SP     Orientation Status oriented x 4;required verbal cueing (specifiy in comments)   VC's for proper exercise performance  -EE,KYUNG,EE2 oriented to;person;place;time;required verbal cueing (specifiy in comments)  -SP     Follows Commands/Answers Questions 100% of the time;able to follow  single-step instructions  -EE,KYUNG,EE2 75% of the time;able to follow single-step instructions;needs cueing;needs increased time;needs repetition  -SP     Personal Safety mild impairment  -EE,KYUNG,EE2 mild impairment  -SP     Personal Safety Interventions muscle strengthening facilitated;fall prevention program maintained;supervised activity  -EE,KYUNG,EE2      Recorded by [EE,KYUNG,EE2] Soha Brush, PT (r) Sebastian Goncalves, PT Student (t) Soha Brush, PT (c) [SP] Maria Eugenia Padilla PT     ROM (Range of Motion)    General ROM  no range of motion deficits identified  -SP     General ROM Detail  heelcords tight   -SP     Recorded by  [SP] Maria Eugenia Padilla PT     MMT (Manual Muscle Testing)    General MMT Assessment  upper extremity strength deficits identified;lower extremity strength deficits identified;neck/trunk strength deficits identified  -SP     General MMT Assessment Detail  moves extremities against gravity. 3-/5, SLR b, bridges half Rom 2/5   -SP     Recorded by  [SP] Maria Eugenia Padilla PT     Bed Mobility, Assessment/Treatment    Bed Mobility, Scoot/Bridge, [place]  verbal cues required;supervision required;other (see comments)   impaired, weak   -SP     Bed Mob, Supine to Sit, [place] not tested  -EE,KYUNG,EE2      Bed Mobility, Impairments strength decreased  -EE,KYUNG,EE2 strength decreased  -SP     Bed Mobility, Comment Not attempted due to pt feeling weak and becoming SOB during exercise  -EE,KYUNG,EE2      Recorded by [EE,KYUNG,EE2] Soha Brush, PT (r) Sebastian Goncalves, PT Student (t) Soha Brush PT (c) [SP] Maria Eugenia Padilla PT     Transfer Assessment/Treatment    Transfers, Sit-Stand [place] not tested  -EE,KYUNG,EE2      Recorded by [EE,KYUNG,EE2] Soha Brush, PT (r) Sebastian Goncalves, PT Student (t) Soha Brush PT (c)      Therapy Exercises    Bilateral Lower Extremities AROM:;supine;10 reps;ankle pumps/circles;heel slides;SLR  -EE,KYUNG,EE2      Recorded by [EE,KYUNG,EE2] Soha Brush, PT (r) Sebastian Goncalves, PT Student (t) Soha  OLAF Brush (c)      Positioning and Restraints    Pre-Treatment Position in bed  -EE,KYUNG,EE2      Post Treatment Position bed  -EE,KYUNG,EE2      In Bed fowlers;notified nsg;call light within reach;encouraged to call for assist;with family/caregiver;with nsg;side rails up x2  -EE,KYUNG,EE2      Recorded by [EE,KYUNG,EE2] Soha Brush, PT (r) Sebastian Goncalves, PT Student (t) Soha Brush, PT (c)        User Key  (r) = Recorded By, (t) = Taken By, (c) = Cosigned By    Initials Name Effective Dates    EE Soha Brush, PT 12/01/15 -     SP Maria Eugenia Padilla, PT 01/09/17 -     KYUNG Goncalves, PT Student 05/08/17 -                 IP PT Goals       06/11/17 1352          Bed Mobility PT LTG    Bed Mobility PT LTG, Date Established 06/11/17  -SP      Bed Mobility PT LTG, Time to Achieve 1 wk  -SP      Bed Mobility PT LTG, Activity Type all bed mobility  -SP      Bed Mobility PT LTG, [place] Level minimum assist (75% patient effort)  -SP      Transfer Training PT LTG    Transfer Training PT LTG, Date Established 06/11/17  -SP      Transfer Training PT LTG, Time to Achieve 1 wk  -SP      Transfer Training PT LTG, Activity Type all transfers  -SP      Transfer Training PT LTG, [place] Level minimum assist (75% patient effort)  -SP      Gait Training PT LTG    Gait Training Goal PT LTG, Date Established 06/11/17  -SP      Gait Training Goal PT LTG, Time to Achieve 1 wk  -SP      Gait Training Goal PT LTG, [place] Level minimum assist (75% patient effort)  -SP      Gait Training Goal PT LTG, Assist Device walker, rolling  -SP      Gait Training Goal PT LTG, Distance to Achieve 75  -SP        User Key  (r) = Recorded By, (t) = Taken By, (c) = Cosigned By    Initials Name Provider Type    SP Maria Eugenia Padilla PT Physical Therapist          Physical Therapy Education     Title: PT OT SLP Therapies (Done)     Topic: Physical Therapy (Done)     Point: Mobility training (Done)    Learning Progress Summary    Learner Readiness Method  Response Comment Documented by Status   Patient Acceptance E VU,NR   06/12/17 0903 Done    Acceptance D,E NR,VU Ther ex, to help advance mobility.  06/11/17 1202 Done   Family Acceptance D,E NR,VU Ther ex, to help advance mobility.  06/11/17 1202 Done               Point: Home exercise program (Done)    Learning Progress Summary    Learner Readiness Method Response Comment Documented by Status   Patient Acceptance E VU,NR   06/12/17 0903 Done    Acceptance D,E NR,VU Ther ex, to help advance mobility. SP 06/11/17 1202 Done   Family Acceptance D,E NR,VU Ther ex, to help advance mobility.  06/11/17 1202 Done               Point: Body mechanics (Done)    Learning Progress Summary    Learner Readiness Method Response Comment Documented by Status   Patient Acceptance E VU,NR   06/12/17 0903 Done    Acceptance D,E NR,VU Ther ex, to help advance mobility.  06/11/17 1202 Done   Family Acceptance D,E NR,VU Ther ex, to help advance mobility.  06/11/17 1202 Done               Point: Precautions (Done)    Learning Progress Summary    Learner Readiness Method Response Comment Documented by Status   Patient Acceptance E VU,NR   06/12/17 0903 Done    Acceptance D,E NR,VU Ther ex, to help advance mobility.  06/11/17 1202 Done   Family Acceptance D,E NR,VU Ther ex, to help advance mobility.  06/11/17 1202 Done                      User Key     Initials Effective Dates Name Provider Type Discipline     01/09/17 -  Maria Eugenia Padilla, PT Physical Therapist PT     05/08/17 -  Sebastian Goncalves PT Student PT Student PT                    PT Recommendation and Plan  Anticipated Discharge Disposition: skilled nursing facility  Planned Therapy Interventions: bed mobility training, strengthening, transfer training, gait training  PT Frequency: daily  Plan of Care Review  Plan Of Care Reviewed With: patient, spouse  Progress: unable to show any progress toward functional goals  Outcome Summary/Follow up Plan: Pt became SOB  easily while performing bed exercises.  Pt complaned of pain in L anterior distal shin and ankle.  Pt feels weak and feels his SOB and pain limit his mobility.  Session ended when he reported breathing problems.  Nursing notified and RN in room at end of session.          Outcome Measures       06/12/17 0900 06/11/17 1300       How much help from another person do you currently need...    Turning from your back to your side while in flat bed without using bedrails? 2  -EE (r) KYUNG (t) EE (c) 2  -SP     Moving from lying on back to sitting on the side of a flat bed without bedrails? 2  -EE (r) KYUNG (t) EE (c) 2  -SP     Moving to and from a bed to a chair (including a wheelchair)? 2  -EE (r) KYUNG (t) EE (c) 2  -SP     Standing up from a chair using your arms (e.g., wheelchair, bedside chair)? 2  -EE (r) KYUNG (t) EE (c) 2  -SP     Climbing 3-5 steps with a railing? 1  -EE (r) KYUNG (t) EE (c) 1  -SP     To walk in hospital room? 2  -EE (r) KYUNG (t) EE (c) 2  -SP     AM-PAC 6 Clicks Score 11  -EE (r) KYUNG (t) 11  -SP     Functional Assessment    Outcome Measure Options  AM-PAC 6 Clicks Basic Mobility (PT)  -SP       User Key  (r) = Recorded By, (t) = Taken By, (c) = Cosigned By    Initials Name Provider Type    THERESA Brush, PT Physical Therapist    CORRY Padilla, PT Physical Therapist    KYUNG Goncalves, PT Student PT Student           Time Calculation:         PT Charges       06/12/17 0857          Time Calculation    Start Time 0840  -EE (r) KYUNG (t) EE (c)      Stop Time 0855  -EE (r) KYUNG (t) EE (c)      Time Calculation (min) 15 min  -EE (r) KYUNG (t)      PT Received On 06/12/17  -EE (r) KYUNG (t) EE (c)      PT - Next Appointment 06/13/17  -EE (r) KYUNG (t) EE (c)        User Key  (r) = Recorded By, (t) = Taken By, (c) = Cosigned By    Initials Name Provider Type    EE Soha Gonsalo, PT Physical Therapist    KYUNG Goncalves, PT Student PT Student          Therapy Charges for Today     Code Description Service Date Service Provider  Modifiers Qty    71724048492 HC PT THER PROC EA 15 MIN 6/12/2017 Sebastian Goncalves, PT Student GP 1          PT G-Codes  Outcome Measure Options: AM-PAC 6 Clicks Basic Mobility (PT)    Sebastian Goncalves PT Student  6/12/2017

## 2017-06-12 NOTE — PLAN OF CARE
Problem: Patient Care Overview (Adult)  Goal: Plan of Care Review  Outcome: Ongoing (interventions implemented as appropriate)    06/12/17 0153   Coping/Psychosocial Response Interventions   Plan Of Care Reviewed With patient;spouse   Outcome Evaluation   Outcome Summary/Follow up Plan Pt VS stable today, pt on 7liter hiflow and sats between 88-92%. Pt lungs sound congested and pt is using yaunker at bedside to suction what he coughs up which is thick yellow/white mucus. Pt is alert and oriented and c/o of back pain at times, which he is given PO pain medication for. Continue IV antibiotics and will continue to monitor. Pt is turn every 2 hours and has a mclean catheter.          Problem: Fall Risk (Adult)  Goal: Identify Related Risk Factors and Signs and Symptoms  Outcome: Ongoing (interventions implemented as appropriate)    06/12/17 0153   Fall Risk   Fall Risk: Related Risk Factors age-related changes;bladder function altered;fatigue/slow reaction;gait/mobility problems;sleep pattern alteration;environment unfamiliar   Fall Risk: Signs and Symptoms presence of risk factors       Goal: Absence of Falls  Outcome: Ongoing (interventions implemented as appropriate)    06/12/17 0153   Fall Risk (Adult)   Absence of Falls making progress toward outcome         Problem: Skin Integrity Impairment, Risk/Actual (Adult)  Goal: Skin Integrity/Wound Healing  Outcome: Ongoing (interventions implemented as appropriate)    06/12/17 0153   Skin Integrity Impairment, Risk/Actual (Adult)   Skin Integrity/Wound Healing making progress toward outcome         Problem: Respiratory Insufficiency (Adult)  Goal: Acid/Base Balance  Outcome: Ongoing (interventions implemented as appropriate)    06/12/17 0153   Respiratory Insufficiency (Adult)   Acid/Base Balance making progress toward outcome       Goal: Effective Ventilation  Outcome: Ongoing (interventions implemented as appropriate)    06/12/17 0153   Respiratory Insufficiency (Adult)    Effective Ventilation making progress toward outcome

## 2017-06-12 NOTE — PROGRESS NOTES
Discharge Planning Assessment  [facility]     Patient Name: Alexander S Mendelsberg  MRN: [id number]  Today's Date: 6/12/2017    Admit Date: 6/9/2017          Discharge Needs Assessment       06/12/17 1423    Living Environment    Lives With spouse    Living Arrangements house    Home Accessibility no concerns    Stair Railings at Home none    Type of Financial/Environmental Concern none    Transportation Available family or friend will provide    Living Environment    Provides Primary Care For no one    Primary Care Provided By spouse/significant other    Quality Of Family Relationships supportive    Able to Return to Prior Living Arrangements yes    Discharge Needs Assessment    Concerns To Be Addressed discharge planning concerns    Anticipated Changes Related to Illness none    Equipment Currently Used at Home walker, chandler;other (see comments)    Discharge Disposition still a patient            Discharge Plan       06/12/17 1448    Case Management/Social Work Plan    Additional Comments IMM 06/12 Spoke with patient at bedside.   I introduced myself and explained CCP role.  Verified face sheet.  Confirmed patient uses [email or web address] on [facility] .  He lives with his wife in a two story house.  Pt uses a walker and has a  stair lift.   He is not entirely  independent with ADL's and requires assistance with most activities.     He uses oxygen from [facility].  . He has no history of Home Health in the past.   He has no rehab history.  Plan is to return Home.  Declines  at this time   CCP will follow for patient needs.       06/12/17 1425    Case Management/Social Work Plan    Plan Undetermined  Offed Road to recovery  Pt family declined         Discharge Placement     No information found                Demographic Summary     None            Functional Status       06/12/17 1422    Functional Status Current    Ambulation 3-->assistive equipment and person    Transferring 3-->assistive  equipment and person    Toileting 3-->assistive equipment and person    Bathing 3-->assistive equipment and person    Dressing 3-->assistive equipment and person    Eating 2-->assistive person    Communication 2-->difficulty understanding (not related to language barrier)    Swallowing (if score 2 or more for any item, consult Rehab Services) 0-->swallows foods/liquids without difficulty    Change in Functional Status Since Onset of Current Illness/Injury yes    Functional Status Prior    Ambulation 0-->independent    Transferring 0-->independent    Toileting 0-->independent    Bathing 0-->independent    Dressing 0-->independent    Eating 0-->independent    Communication 0-->understands/communicates without difficulty    Swallowing 0-->swallows foods/liquids without difficulty    Activity Tolerance    Current Activity Limitations bed rest    Cognitive/Perceptual/Developmental    Current Mental Status/Cognitive Functioning unable to assess    Recent Changes in Mental Status/Cognitive Functioning unable to assess            Psychosocial     None            Abuse/Neglect     None            Legal     None            Substance Abuse     None            Patient Forms     None          Neida Kc RN

## 2017-06-12 NOTE — PROGRESS NOTES
[facility]           LOS: 3 days   Patient Care Team:  Elmo Aguilera Jr., MD as PCP - General (Family Medicine)  Som Garrett MD as Consulting Physician (Cardiology)  Joo Christensen Jr., MD as Consulting Physician (Urology)  Katelynn Quan MD as Consulting Physician (Radiation Oncology)  Vahid Garcia MD as Consulting Physician (Hematology and Oncology)  Joo Marin MD as Referring Physician (Emergency Medicine)  Ochoa Marks MD as Consulting Physician (Pulmonary Disease)    Chief Complaint: Bilateral pneumonia with acute on chronic respiratory failure in the setting of metastatic renal cell carcinoma    Interval History:   Using BiPAP.  He remains on Oxygen 7L/m.  He has good cough with significant sputum which appears to be whitish and creamy.  He denies dyspnea at rest    REVIEW OF SYSTEMS:   Constitutional: No fever or chills  Cardiovascular: Denies chest pain, palpitation or swelling.  Gastrointestinal: Tolerating diet.  No nausea or vomiting    Ventilator/Non-Invasive Ventilation Settings     Start     Ordered    06/09/17 1702  . BIPAP; IPAP: 18; EPAP: 6; Breath Rate: 20; Titrate for SPO2: 88%, Greater Than or Equal To  Until Discontinued     Question Answer Comment   . BIPAP    IPAP 18    EPAP 6    Breath Rate 20    Titrate for SPO2 88%    Titrate for SPO2 Greater Than or Equal To        06/09/17 1708    06/09/17 1341  . BIPAP; IPAP: 18; EPAP: 6; Titrate for SPO2: 88% - 92%  Until Discontinued     Question Answer Comment   . BIPAP    IPAP 18    EPAP 6    Titrate for SPO2 88% - 92%        06/09/17 1340        Off BiPAP currently.    Vital Signs  Temp:  [97.7 °F (36.5 °C)-97.9 °F (36.6 °C)] 97.9 °F (36.6 °C)  Heart Rate:  [] 94  Resp:  [18-22] 20  BP: (113-131)/(73-85) 131/75    Intake/Output Summary (Last 24 hours) at 06/12/17 1614  Last data filed at 06/12/17 1304   Gross per 24 hour   Intake              878 ml   Output             1475 ml   Net       "       -597 ml     Flowsheet Rows         First Filed Value    Admission Height  74\" (188 cm) Documented at 06/09/2017 1232    Admission Weight  230 lb (104 kg) Documented at 06/09/2017 1232          Physical Exam:   General Appearance:    Not in acute distress    Lungs:     Diminished breath sounds with crackles bilaterally on the bases     Heart:    Regular rhythm and normal rate, normal S1 and S2, no            murmur, no gallop, no rub, no click   Chest Wall:    No abnormalities observed   Abdomen:     Normal bowel sounds, no masses, no organomegaly, soft        non-tender, non-distended, no guarding, no rebound                tenderness   Extremities:   Moves all extremities well, no edema, no cyanosis, no             redness     Results Review:          Results from last 7 days  Lab Units 06/12/17  0509 06/11/17  0709 06/10/17  0200   SODIUM mmol/L 134* 131* 135*   POTASSIUM mmol/L 4.5 4.8 5.2   CHLORIDE mmol/L 92* 90* 93*   TOTAL CO2 mmol/L 33.3* 33.9* 32.9*   BUN mg/dL 21 26* 29*   CREATININE mg/dL 1.48* 1.51* 1.56*   GLUCOSE mg/dL 106* 119* 104*   CALCIUM mg/dL 6.9* 7.3* 7.4*       Results from last 7 days  Lab Units 06/09/17  1250   TROPONIN T ng/mL <0.010       Results from last 7 days  Lab Units 06/12/17  0509 06/11/17  0709 06/10/17  0200   WBC 10*3/mm3 8.43 9.67 10.87*   HEMOGLOBIN g/dL 9.6* 9.6* 9.2*   HEMATOCRIT % 30.8* 31.7* 30.1*   PLATELETS 10*3/mm3 215 243 200       Results from last 7 days  Lab Units 06/09/17  1250   INR  1.36*                   Results from last 7 days  Lab Units 06/10/17  0346   PH, ARTERIAL pH units 7.328*   PO2 ART mm Hg 89.7   PCO2, ARTERIAL mm Hg 75.5*   HCO3 ART mmol/L 39.6*       I reviewed the patient's new clinical results.  I personally viewed and interpreted the patient's CXR        Medication Review:     apixaban 5 mg Oral BID   bisacodyl 10 mg Rectal Daily   diltiaZEM  mg Oral Q24H   finasteride 5 mg Oral Daily   folic acid 400 mcg Oral Daily   furosemide 40 " mg Oral Daily   ipratropium-albuterol 3 mL Nebulization 4x Daily - RT   piperacillin-tazobactam 3.375 g Intravenous Q8H   And      Linezolid 600 mg Intravenous Q12H   metoprolol tartrate 50 mg Oral BID   pantoprazole 40 mg Oral BID   polyethylene glycol 17 g Oral Nightly   potassium chloride 10 mEq Oral Daily   tamsulosin 0.4 mg Oral Daily     Diagnostic imaging:  I personally and independently reviewed the CT of the chest performed on 6/9/17:  Right lower lobe consolidation with air bronchogram; bilateral pleural effusion, R >L; slightly enlarged mediastinal adenopathies, mainly the subcarinal         ASSESSMENT:   1) 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 fib  -History of left atrial appendage clot on anticoagulation  -Coronary artery disease  -Chronic kidney disease  -Pulmonary hypertension with history of PE (RVSP = 40 on echo dated 11/10/2016)      PLAN:  - Day 4 antibiotics with Zosyn and Zyvox  - Continue bronchodilator therapy  - Continue off BiPAP   - PTOT  - Flomax for urinary retention  - needs follow-up imaging to ensure that the mediastinal adenopathies are not increasing in size    Discussed with the patient and his wife    Rachel Murrell MD  06/12/17  4:14 PM

## 2017-06-12 NOTE — PLAN OF CARE
Problem: Inpatient SLP  Goal: Dysphagia- Patient will safely consume diet as per recommendation with no signs/symptoms of aspiration  Outcome: Ongoing (interventions implemented as appropriate)    06/12/17 1214   Safely Consume Diet   Safely Consume Diet- SLP, Time to Achieve by discharge   Safely Consume Diet- SLP, Outcome goal ongoing   Patient re-evaluated at bedside. Patient currently on nectar thick liquids with puree. Recommend no changes to current diet.

## 2017-06-13 NOTE — PLAN OF CARE
Problem: Patient Care Overview (Adult)  Goal: Plan of Care Review  Outcome: Ongoing (interventions implemented as appropriate)    06/13/17 9051   Coping/Psychosocial Response Interventions   Plan Of Care Reviewed With patient;spouse   Outcome Evaluation   Outcome Summary/Follow up Plan Pt weaned to 5L high ginger; sats low 90s; cont to have congested cough; denies pain; up in chair with PT; Rose cath removed per protocol; able to void spontaneously; cont abx   Patient Care Overview   Progress improving         Problem: Fall Risk (Adult)  Goal: Absence of Falls  Outcome: Ongoing (interventions implemented as appropriate)    Problem: Skin Integrity Impairment, Risk/Actual (Adult)  Goal: Skin Integrity/Wound Healing  Outcome: Ongoing (interventions implemented as appropriate)    Problem: Respiratory Insufficiency (Adult)  Goal: Acid/Base Balance  Outcome: Ongoing (interventions implemented as appropriate)

## 2017-06-13 NOTE — PLAN OF CARE
Problem: Patient Care Overview (Adult)  Goal: Plan of Care Review  Outcome: Ongoing (interventions implemented as appropriate)    06/13/17 0450   Coping/Psychosocial Response Interventions   Plan Of Care Reviewed With patient   Outcome Evaluation   Outcome Summary/Follow up Plan very weak, safety maintained, conts on AB, prod cough of thick white secretions, requiring 02 at 7L per high flow, wife at bedside.   Patient Care Overview   Progress no change       Goal: Adult Individualization and Mutuality  Outcome: Ongoing (interventions implemented as appropriate)  Goal: Discharge Needs Assessment  Outcome: Ongoing (interventions implemented as appropriate)    Problem: Fall Risk (Adult)  Goal: Identify Related Risk Factors and Signs and Symptoms  Outcome: Outcome(s) achieved Date Met:  06/13/17  Goal: Absence of Falls  Outcome: Ongoing (interventions implemented as appropriate)    Problem: Skin Integrity Impairment, Risk/Actual (Adult)  Goal: Skin Integrity/Wound Healing  Outcome: Ongoing (interventions implemented as appropriate)    Problem: Respiratory Insufficiency (Adult)  Goal: Acid/Base Balance  Outcome: Ongoing (interventions implemented as appropriate)  Goal: Effective Ventilation  Outcome: Ongoing (interventions implemented as appropriate)

## 2017-06-13 NOTE — THERAPY TREATMENT NOTE
Acute Care - Physical Therapy Treatment Note  [facility]     Patient Name: Alexander S Mendelsberg  : 1935  MRN: [id number]  Today's Date: 2017  Onset of Illness/Injury or Date of Surgery Date: 17          Admit Date: 2017    Visit Dx:    ICD-10-CM ICD-9-CM   1. Acute respiratory failure with hypoxia and hypercapnia J96.01 518.81    J96.02    2. Lung cancer, primary, with metastasis from lung to other sites - brain, liver and bone, unspecified laterality C34.90 162.9   3. Aspiration pneumonia of both lungs, unspecified aspiration pneumonia type, unspecified part of lung J69.0 507.0   4. Acute on chronic anemia, unspecified type D64.9 285.9   5. Oropharyngeal dysphagia R13.12 787.22   6. Difficulty walking R26.2 719.7     Patient Active Problem List   Diagnosis   • Anxiety   • Cough   • Chronic kidney disease, stage III (moderate)   • Difficulty breathing   • Fatigue   • Palpitations   • Hypertension   • Impaired fasting glucose   • Arthralgia of hip   • Renal insufficiency   • O2 dependent   • Pain in right knee   • Arthritis of right knee   • Abnormal electrocardiogram   • Coronary arteriosclerosis in native artery   • Chronic atrial fibrillation   • Chronic coronary artery disease   • Shortness of breath   • Status post coronary artery balloon dilation   • Hyperlipidemia   • Adiposity   • Right fascicular block   • Right flank pain   • Renal cell carcinoma of right kidney   • Weight loss   • History of gross hematuria   • H/O thrombocytopenia   • Pulmonary hypertension   • Left atrial thrombus   • Bone metastases   • Brain metastasis   • Malignant neoplasm of overlapping sites of left lung   • Sepsis   • Fitting and adjustment of vascular catheter   • Acute respiratory failure with hypoxia and hypercapnia               Adult Rehabilitation Note       17 0834 17 0800 17 1100    Rehab Assessment/Intervention    Discipline physical therapist  -EE --   PT Student  -EE,KYNUG,EE2  physical therapist  -SP    Document Type therapy note (daily note)  -EE therapy note (daily note)  -EE,KYUNG,EE2 evaluation  -SP    Subjective Information agree to therapy  -EE agree to therapy;complains of;weakness;pain;dyspnea  -EE,KYUNG,EE2 agree to therapy  -SP    Patient Effort, Rehab Treatment good  -EE good  -EE,KYUNG,EE2 good  -SP    Symptoms Noted During/After Treatment none  -EE shortness of breath  -EE,KYUNG,EE2 other (see comments)   sats dec some   -SP    Precautions/Limitations fall precautions;oxygen therapy device and L/min;other (see comments)   contact and droplet precautions; 7 L O2/min per nc  -EE  --   contact   -SP    Patient Response to Treatment  Pt complained of SOB although sats dropped only slightly.  Pt's pain in left shin/ankle remained during AROM exercises  -EE,KYUNG,EE2     Recorded by [EE] Soha Brush PT [EE,KYUNG,EE2] Soha Brush, PT (r) Sebastian Goncalves, PT Student (t) Soha Brush PT (c) [SP] Maria Eugenia Padilla, PT    Vital Signs    Pre SpO2 (%) 93  -EE 89  -EE,KYUNG,EE2     O2 Delivery Pre Treatment supplemental O2  -EE supplemental O2  -EE,KYUNG,EE2     Intra SpO2 (%)  85  -EE,KYUNG,EE2     O2 Delivery Intra Treatment supplemental O2  -EE supplemental O2  -EE,KYUNG,EE2     Post SpO2 (%) 91  -EE 90  -EE,KYUNG,EE2     O2 Delivery Post Treatment supplemental O2  -EE supplemental O2  -EE,KYUNG,EE2     Pre Patient Position Supine  -EE      Intra Patient Position Standing  -EE      Post Patient Position Sitting  -EE      Recorded by [EE] Soha Brush PT [EE,KYUNG,EE2] Soha Brush PT (r) Sebastian Goncalves, PT Student (t) Soha Brush PT (c)     Pain Assessment    Pain Assessment No/denies pain  -EE FLACC  -EE,KYUNG,EE2     Pain Location  Ankle   and shin  -EE,KYUNG,EE2     Pain Orientation  Left;Anterior  -EE,KYUNG,EE2     Pain Frequency  Constant/continuous  -EE,KYUNG,EE2     Effect of Pain on Daily Activities  Preventing mobility  -EE,KYUNG,EE2     Pain Intervention(s)  Ambulation/increased activity  -EE,KYUNG,EE2     Recorded by [EE] Soha Brush,  PT [EE,KYUNG,EE2] Soha Brush, PT (r) Sebastian Goncalves, PT Student (t) Soha Brush PT (c)     Cognitive Assessment/Intervention    Current Cognitive/Communication Assessment functional  -EE functional  -EE,KYUNG,EE2 impaired  -SP    Orientation Status oriented x 4  -EE oriented x 4;required verbal cueing (specifiy in comments)   VC's for proper exercise performance  -EE,KYUNG,EE2 oriented to;person;place;time;required verbal cueing (specifiy in comments)  -SP    Follows Commands/Answers Questions 100% of the time;able to follow single-step instructions;needs cueing;needs repetition  -% of the time;able to follow single-step instructions  -EE,KYUNG,EE2 75% of the time;able to follow single-step instructions;needs cueing;needs increased time;needs repetition  -SP    Personal Safety mild impairment  -EE mild impairment  -EE,KYUNG,EE2 mild impairment  -SP    Personal Safety Interventions fall prevention program maintained;gait belt;muscle strengthening facilitated;nonskid shoes/slippers when out of bed;supervised activity  -EE muscle strengthening facilitated;fall prevention program maintained;supervised activity  -EE,KUYNG,EE2     Recorded by [EE] Soha Brush PT [EE,KYUNG,EE2] Soha Brush PT (r) Sebastian Goncalves, PT Student (t) Soha Brush PT (c) [SP] Maria Eugenia Padilla, PT    ROM (Range of Motion)    General ROM   no range of motion deficits identified  -SP    General ROM Detail   heelcords tight   -SP    Recorded by   [SP] Maria Eugenia Padilla PT    MMT (Manual Muscle Testing)    General MMT Assessment   upper extremity strength deficits identified;lower extremity strength deficits identified;neck/trunk strength deficits identified  -SP    General MMT Assessment Detail   moves extremities against gravity. 3-/5, SLR b, bridges half Rom 2/5   -SP    Recorded by   [SP] Maria Eugenia Padilla PT    Bed Mobility, Assessment/Treatment    Bed Mobility, Assistive Device bed rails;head of bed elevated  -EE      Bed Mobility, Scoot/Bridge, [place]    verbal cues required;supervision required;other (see comments)   impaired, weak   -SP    Bed Mob, Supine to Sit, [place] supervision required  -EE not tested  -EE,KYUNG,EE2     Bed Mob, Sit to Supine, [place] not tested   up in chair  -EE      Bed Mobility, Impairments  strength decreased  -EE,KYUNG,EE2 strength decreased  -SP    Bed Mobility, Comment  Not attempted due to pt feeling weak and becoming SOB during exercise  -EE,KYUNG,EE2     Recorded by [EE] Soha Brush PT [EE,KYUNG,EE2] Soha Brush PT (r) Sebastian Goncalves, PT Student (t) Soha Brush PT (c) [SP] Maria Eugenia Padilla PT    Transfer Assessment/Treatment    Transfers, Sit-Stand [place] minimum assist (75% patient effort);verbal cues required  -EE not tested  -EE,KYUNG,EE2     Transfers, Stand-Sit [place] minimum assist (75% patient effort);verbal cues required  -EE      Transfers, Sit-Stand-Sit, Assist Device rolling walker;elevated surface   rollator  -EE      Transfer, Impairments strength decreased;impaired balance  -EE      Transfer, Comment verbal cues required for hand placement during transfer  -EE      Recorded by [EE] Soha Brush PT [EE,KYUNG,EE2] Soha Brush PT (r) Sebastian Goncalves, PT Student (t) Soha Brush PT (c)     Gait Assessment/Treatment    Gait, [place] Level minimum assist (75% patient effort);verbal cues required  -EE      Gait, Assistive Device rollator  -EE      Gait, Distance (Feet) 10  -EE      Gait, Gait Deviations forward flexed posture;claudio decreased;bilateral:;decreased heel strike;step length decreased;stride length decreased;narrow base   B knee flexion  -EE      Gait, Safety Issues step length decreased;weight-shifting ability decreased;balance decreased during turns;supplemental O2  -EE      Gait, Impairments strength decreased;impaired balance  -EE      Gait, Comment Distance limited by O2 line; will plan to bring tank at next visit  -EE      Recorded by [EE] Soha Brush PT      Motor Skills/Interventions     Additional Documentation Balance Skills Training (Group)  -EE      Recorded by [EE] Soha Brush, PT      Therapy Exercises    Bilateral Lower Extremities AROM:;10 reps;sitting;ankle pumps/circles;LAQ  -EE AROM:;supine;10 reps;ankle pumps/circles;heel slides;SLR  -EE,KYUNG,EE2     Recorded by [EE] Soha Brush, PT [EE,KYUNG,EE2] Soha Brush, PT (r) Sebastian Goncalves, PT Student (t) Soha Brush PT (c)     Positioning and Restraints    Pre-Treatment Position in bed  -EE in bed  -EE,KYUNG,EE2     Post Treatment Position chair  -EE bed  -EE,KYNUG,EE2     In Bed  fowlers;notified nsg;call light within reach;encouraged to call for assist;with family/caregiver;with nsg;side rails up x2  -EE,KYUNG,EE2     In Chair sitting;call light within reach;encouraged to call for assist;with family/caregiver;notified nsg  -EE      Recorded by [EE] Soha Brush, PT [EE,KYUNG,EE2] Soha Brush PT (r) Sebsatian Goncalves, PT Student (t) Soha Brush PT (c)       User Key  (r) = Recorded By, (t) = Taken By, (c) = Cosigned By    Initials Name Effective Dates    EE Soha Brush, PT 12/01/15 -     SP Maria Eugenia Padilla, PT 01/09/17 -     KYUNG Goncalves, PT Student 05/08/17 -                 IP PT Goals       06/11/17 1352          Bed Mobility PT LTG    Bed Mobility PT LTG, Date Established 06/11/17  -SP      Bed Mobility PT LTG, Time to Achieve 1 wk  -SP      Bed Mobility PT LTG, Activity Type all bed mobility  -SP      Bed Mobility PT LTG, [place] Level minimum assist (75% patient effort)  -SP      Transfer Training PT LTG    Transfer Training PT LTG, Date Established 06/11/17  -SP      Transfer Training PT LTG, Time to Achieve 1 wk  -SP      Transfer Training PT LTG, Activity Type all transfers  -SP      Transfer Training PT LTG, [place] Level minimum assist (75% patient effort)  -SP      Gait Training PT LTG    Gait Training Goal PT LTG, Date Established 06/11/17  -SP      Gait Training Goal PT LTG, Time to Achieve 1 wk  -SP      Gait Training Goal PT LTG,  [place] Level minimum assist (75% patient effort)  -SP      Gait Training Goal PT LTG, Assist Device walker, rolling  -SP      Gait Training Goal PT LTG, Distance to Achieve 75  -SP        User Key  (r) = Recorded By, (t) = Taken By, (c) = Cosigned By    Initials Name Provider Type    CORRY Padilla, PT Physical Therapist          Physical Therapy Education     Title: PT OT SLP Therapies (Done)     Topic: Physical Therapy (Done)     Point: Mobility training (Done)    Learning Progress Summary    Learner Readiness Method Response Comment Documented by Status   Patient Acceptance E,TB NR,VU   06/13/17 0856 Done    Acceptance E VU,NR   06/12/17 0903 Done    Acceptance D,E NR,VU Ther ex, to help advance mobility.  06/11/17 1202 Done   Family Acceptance D,E NR,VU Ther ex, to help advance mobility.  06/11/17 1202 Done               Point: Home exercise program (Done)    Learning Progress Summary    Learner Readiness Method Response Comment Documented by Status   Patient Acceptance E,TB NR,VU   06/13/17 0856 Done    Acceptance E VU,NR   06/12/17 0903 Done    Acceptance D,E NR,VU Ther ex, to help advance mobility.  06/11/17 1202 Done   Family Acceptance D,E NR,VU Ther ex, to help advance mobility.  06/11/17 1202 Done               Point: Body mechanics (Done)    Learning Progress Summary    Learner Readiness Method Response Comment Documented by Status   Patient Acceptance E,TB NR,VU  EE 06/13/17 0856 Done    Acceptance E VU,NR   06/12/17 0903 Done    Acceptance D,E NR,VU Ther ex, to help advance mobility.  06/11/17 1202 Done   Family Acceptance D,E NR,VU Ther ex, to help advance mobility.  06/11/17 1202 Done               Point: Precautions (Done)    Learning Progress Summary    Learner Readiness Method Response Comment Documented by Status   Patient Acceptance E,TB NR,VU  EE 06/13/17 0856 Done    Acceptance E VU,NR   06/12/17 0903 Done    Acceptance D,E NR,VU Ther ex, to help advance  mobility. SP 06/11/17 1202 Done   Family Acceptance D,E NR,VU Ther ex, to help advance mobility. SP 06/11/17 1202 Done                      User Key     Initials Effective Dates Name Provider Type Discipline    EE 12/01/15 -  Soha Brush, PT Physical Therapist PT    SP 01/09/17 -  Maria Eugenia Padilla, PT Physical Therapist PT    KYUNG 05/08/17 -  Sebastian Goncalves, PT Student PT Student PT                    PT Recommendation and Plan  Anticipated Discharge Disposition: skilled nursing facility  Planned Therapy Interventions: bed mobility training, strengthening, transfer training, gait training  PT Frequency: daily  Plan of Care Review  Plan Of Care Reviewed With: patient  Progress: improving  Outcome Summary/Follow up Plan: Pt demonstrating improved functional strength and endurance as evidenced by tolerance of increased activity. Pt able to stand and take several steps in room with min A. Continues to require assist of one due to weakness and balance impairment; will continue to increase activity as tolerated.           Outcome Measures       06/13/17 0800 06/12/17 0900 06/11/17 1300    How much help from another person do you currently need...    Turning from your back to your side while in flat bed without using bedrails? 4  -EE 2  -EE (r) KYUNG (t) EE (c) 2  -SP    Moving from lying on back to sitting on the side of a flat bed without bedrails? 3  -EE 2  -EE (r) KYUNG (t) EE (c) 2  -SP    Moving to and from a bed to a chair (including a wheelchair)? 3  -EE 2  -EE (r) KYUNG (t) EE (c) 2  -SP    Standing up from a chair using your arms (e.g., wheelchair, bedside chair)? 3  -EE 2  -EE (r) KYUNG (t) EE (c) 2  -SP    Climbing 3-5 steps with a railing? 2  -EE 1  -EE (r) KYUNG (t) EE (c) 1  -SP    To walk in hospital room? 3  -EE 2  -EE (r) KYUNG (t) EE (c) 2  -SP    AM-PAC 6 Clicks Score 18  -EE 11  -EE (r) KYUNG (t) 11  -SP    Functional Assessment    Outcome Measure Options AM-PAC 6 Clicks Basic Mobility (PT)  -EE  AM-PAC 6 Clicks Basic Mobility  (PT)  -SP      User Key  (r) = Recorded By, (t) = Taken By, (c) = Cosigned By    Initials Name Provider Type    EE Soha Brush, PT Physical Therapist    SP Maria Eugenia Padilla, PT Physical Therapist    KYUNG Goncalves, PT Student PT Student           Time Calculation:         PT Charges       06/13/17 0858          Time Calculation    Start Time 0833  -EE      Stop Time 0850  -EE      Time Calculation (min) 17 min  -EE      PT Received On 06/13/17  -EE      PT - Next Appointment 06/14/17  -EE        User Key  (r) = Recorded By, (t) = Taken By, (c) = Cosigned By    Initials Name Provider Type    EE Soha Brush, PT Physical Therapist          Therapy Charges for Today     Code Description Service Date Service Provider Modifiers Qty    89982195131 HC PT THER PROC EA 15 MIN 6/13/2017 Soha Brush, PT GP 1          PT G-Codes  Outcome Measure Options: AM-PAC 6 Clicks Basic Mobility (PT)    Soha Brush PT  6/13/2017

## 2017-06-13 NOTE — PLAN OF CARE
Problem: Patient Care Overview (Adult)  Goal: Plan of Care Review    06/13/17 0857   Coping/Psychosocial Response Interventions   Plan Of Care Reviewed With patient   Outcome Evaluation   Outcome Summary/Follow up Plan Pt demonstrating improved functional strength and endurance as evidenced by tolerance of increased activity. Pt able to stand and take several steps in room with min A. Continues to require assist of one due to weakness and balance impairment; will continue to increase activity as tolerated.    Patient Care Overview   Progress improving

## 2017-06-14 NOTE — PROGRESS NOTES
"      [facility]           LOS: 4 days   Patient Care Team:  Elmo Aguilera Jr., MD as PCP - General (Family Medicine)  Som Garrett MD as Consulting Physician (Cardiology)  Joo Christensen Jr., MD as Consulting Physician (Urology)  Katelynn Quan MD as Consulting Physician (Radiation Oncology)  Vahid Garcia MD as Consulting Physician (Hematology and Oncology)  Joo Marin MD as Referring Physician (Emergency Medicine)  Ochoa Marks MD as Consulting Physician (Pulmonary Disease)    Chief Complaint: Bilateral pneumonia with acute on chronic respiratory failure in the setting of metastatic renal cell carcinoma    Interval History:   Oxygen requirement improved to 4L.min He continues to have large creamy sputum secretion. He has dyspnea on mild exertion and while talking. He said \" I want to die\". He's capable to make decision and denies depression.     REVIEW OF SYSTEMS:   Constitutional: No fever or chills  Cardiovascular: Denies chest pain, palpitation or swelling.  Gastrointestinal: Tolerating diet.  No nausea or vomiting      Vital Signs  Temp:  [97.2 °F (36.2 °C)-98.6 °F (37 °C)] 98.6 °F (37 °C)  Heart Rate:  [] 110  Resp:  [14-20] 20  BP: (132-147)/(79-98) 146/98    Intake/Output Summary (Last 24 hours) at 06/13/17 2248  Last data filed at 06/13/17 2152   Gross per 24 hour   Intake              550 ml   Output             2675 ml   Net            -2125 ml     Flowsheet Rows         First Filed Value    Admission Height  74\" (188 cm) Documented at 06/09/2017 1232    Admission Weight  230 lb (104 kg) Documented at 06/09/2017 1232          Physical Exam:   General Appearance:    Not in acute distress    Lungs:     Diminished breath sounds with crackles bilaterally on the bases     Heart:    Regular rhythm and normal rate, normal S1 and S2, no            murmur, no gallop, no rub, no click   Chest Wall:    No abnormalities observed   Abdomen:     Normal bowel " sounds, no masses, no organomegaly, soft        non-tender, non-distended, no guarding, no rebound                tenderness   Extremities:   Moves all extremities well, no edema, no cyanosis, no             redness     Results Review:          Results from last 7 days  Lab Units 06/12/17  0509 06/11/17  0709 06/10/17  0200   SODIUM mmol/L 134* 131* 135*   POTASSIUM mmol/L 4.5 4.8 5.2   CHLORIDE mmol/L 92* 90* 93*   TOTAL CO2 mmol/L 33.3* 33.9* 32.9*   BUN mg/dL 21 26* 29*   CREATININE mg/dL 1.48* 1.51* 1.56*   GLUCOSE mg/dL 106* 119* 104*   CALCIUM mg/dL 6.9* 7.3* 7.4*       Results from last 7 days  Lab Units 06/09/17  1250   TROPONIN T ng/mL <0.010       Results from last 7 days  Lab Units 06/12/17  0509 06/11/17  0709 06/10/17  0200   WBC 10*3/mm3 8.43 9.67 10.87*   HEMOGLOBIN g/dL 9.6* 9.6* 9.2*   HEMATOCRIT % 30.8* 31.7* 30.1*   PLATELETS 10*3/mm3 215 243 200       Results from last 7 days  Lab Units 06/09/17  1250   INR  1.36*                   Results from last 7 days  Lab Units 06/10/17  0346   PH, ARTERIAL pH units 7.328*   PO2 ART mm Hg 89.7   PCO2, ARTERIAL mm Hg 75.5*   HCO3 ART mmol/L 39.6*       I reviewed the patient's new clinical results.  I personally viewed and interpreted the patient's CXR        Medication Review:     apixaban 5 mg Oral BID   bisacodyl 10 mg Rectal Daily   diltiaZEM  mg Oral Q24H   finasteride 5 mg Oral Daily   folic acid 400 mcg Oral Daily   furosemide 40 mg Oral Daily   ipratropium-albuterol 3 mL Nebulization 4x Daily - RT   piperacillin-tazobactam 3.375 g Intravenous Q8H   And      Linezolid 600 mg Intravenous Q12H   metoprolol tartrate 50 mg Oral BID   pantoprazole 40 mg Oral BID   polyethylene glycol 17 g Oral Nightly   potassium chloride 10 mEq Oral Daily   tamsulosin 0.4 mg Oral Daily     Diagnostic imaging:  I personally and independently reviewed the CT of the chest performed on 6/9/17:  Right lower lobe consolidation with air bronchogram; bilateral pleural  effusion, R >L; slightly enlarged mediastinal adenopathies, mainly the subcarinal         ASSESSMENT:   1) 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 fib  -History of left atrial appendage clot on anticoagulation  -Coronary artery disease  -Chronic kidney disease  -Pulmonary hypertension with history of PE (RVSP = 40 on echo dated 11/10/2016)      PLAN:  - Day 5 antibiotics with Zosyn and Zyvox  - Consult palliative care. I discussed comfort care with him at length. Wife was opposed to the idea and wants to give him chance to recover. I advised him to discuss the issue with his family.  - BMP in am  - Continue bronchodilator therapy  - Continue off BiPAP   - PTOT  - Flomax for urinary retention  - needs follow-up imaging to ensure that the mediastinal adenopathies are not increasing in size    Discussed with the patient and his wife    Rachel Murrell MD  06/13/17  10:48 PM

## 2017-06-14 NOTE — PLAN OF CARE
Problem: Patient Care Overview (Adult)  Goal: Plan of Care Review  Outcome: Ongoing (interventions implemented as appropriate)    06/14/17 1440   Coping/Psychosocial Response Interventions   Plan Of Care Reviewed With patient   Outcome Evaluation   Outcome Summary/Follow up Plan Pt increased mobility today. Increased fatigue w/ exercises and ambulation which limited further mobility.

## 2017-06-14 NOTE — CONSULTS
"Spiritual Care consult requested by IKE Calixto.  Patient has Palliative consult scheduled at 1300 and nurse requested a visit beforehand.     Spiritual assessment reveals that the patient is connected to God, coping his diagnosis, and has support from his wife who was present during the visit.  Patient discussed his \"peace\" with deciding to start palliative care measures.  Patient and wife expressed no needs but was grateful for the spiritual support and appreciative for all of the staff here at [facility].  No other needs expressed at this time.    Rev. celena Simmons MDiv.    "

## 2017-06-14 NOTE — PLAN OF CARE
Problem: Patient Care Overview (Adult)  Goal: Plan of Care Review  Outcome: Ongoing (interventions implemented as appropriate)    06/14/17 1426   Coping/Psychosocial Response Interventions   Plan Of Care Reviewed With patient;spouse   Outcome Evaluation   Outcome Summary/Follow up Plan Pallative consult done today;pending transfer;  also met with family at bedside; pain tolerance ; able to get up with PT and ambulate in room; pain mgmt improving; prune juice/suppository given PRN constipation   Patient Care Overview   Progress no change         Problem: Fall Risk (Adult)  Goal: Absence of Falls  Outcome: Ongoing (interventions implemented as appropriate)    Problem: Skin Integrity Impairment, Risk/Actual (Adult)  Goal: Skin Integrity/Wound Healing  Outcome: Ongoing (interventions implemented as appropriate)    Problem: Respiratory Insufficiency (Adult)  Goal: Acid/Base Balance  Outcome: Ongoing (interventions implemented as appropriate)

## 2017-06-14 NOTE — THERAPY TREATMENT NOTE
Acute Care - Physical Therapy Treatment Note  [facility]     Patient Name: Alexander S Mendelsberg  : 1935  MRN: [id number]  Today's Date: 2017  Onset of Illness/Injury or Date of Surgery Date: 17          Admit Date: 2017    Visit Dx:    ICD-10-CM ICD-9-CM   1. Acute respiratory failure with hypoxia and hypercapnia J96.01 518.81    J96.02    2. Lung cancer, primary, with metastasis from lung to other sites - brain, liver and bone, unspecified laterality C34.90 162.9   3. Aspiration pneumonia of both lungs, unspecified aspiration pneumonia type, unspecified part of lung J69.0 507.0   4. Acute on chronic anemia, unspecified type D64.9 285.9   5. Oropharyngeal dysphagia R13.12 787.22   6. Difficulty walking R26.2 719.7     Patient Active Problem List   Diagnosis   • Anxiety   • Cough   • Chronic kidney disease, stage III (moderate)   • Difficulty breathing   • Fatigue   • Palpitations   • Hypertension   • Impaired fasting glucose   • Arthralgia of hip   • Renal insufficiency   • O2 dependent   • Pain in right knee   • Arthritis of right knee   • Abnormal electrocardiogram   • Coronary arteriosclerosis in native artery   • Chronic atrial fibrillation   • Chronic coronary artery disease   • Shortness of breath   • Status post coronary artery balloon dilation   • Hyperlipidemia   • Adiposity   • Right fascicular block   • Right flank pain   • Renal cell carcinoma of right kidney   • Weight loss   • History of gross hematuria   • H/O thrombocytopenia   • Pulmonary hypertension   • Left atrial thrombus   • Bone metastases   • Brain metastasis   • Malignant neoplasm of overlapping sites of left lung   • Sepsis   • Fitting and adjustment of vascular catheter   • Acute respiratory failure with hypoxia and hypercapnia               Adult Rehabilitation Note       17 1400 17 0834 17 0800    Rehab Assessment/Intervention    Discipline physical therapy assistant  -RW physical therapist   -EE --   PT Student  -EE,KYUNG,EE2    Document Type therapy note (daily note)  -RW therapy note (daily note)  -EE therapy note (daily note)  -EE,KYUNG,EE2    Subjective Information agree to therapy  -RW agree to therapy  -EE agree to therapy;complains of;weakness;pain;dyspnea  -EE,KYUNG,EE2    Patient Effort, Rehab Treatment good  -RW good  -EE good  -EE,KYUNG,EE2    Symptoms Noted During/After Treatment  none  -EE shortness of breath  -EE,KYUNG,EE2    Precautions/Limitations fall precautions;oxygen therapy device and L/min   4L O2  -RW fall precautions;oxygen therapy device and L/min;other (see comments)   contact and droplet precautions; 7 L O2/min per nc  -EE     Patient Response to Treatment   Pt complained of SOB although sats dropped only slightly.  Pt's pain in left shin/ankle remained during AROM exercises  -EE,KYUNG,EE2    Recorded by [RW] Trista Tirado, PTA [EE] Soha Brush PT [EE,KYUNG,EE2] Soha Brush PT (r) Sebastian Goncalves, PT Student (t) Soha Brush, OLAF (c)    Vital Signs    Pre SpO2 (%) 90  -RW 93  -EE 89  -EE,KYUNG,EE2    O2 Delivery Pre Treatment supplemental O2   4L  -RW supplemental O2  -EE supplemental O2  -EE,KYUNG,EE2    Intra SpO2 (%)   85  -EE,KYUNG,EE2    O2 Delivery Intra Treatment  supplemental O2  -EE supplemental O2  -EE,KYUNG,EE2    Post SpO2 (%) 88  -RW 91  -EE 90  -EE,KYUNG,EE2    O2 Delivery Post Treatment supplemental O2   4L  -RW supplemental O2  -EE supplemental O2  -EE,KYUNG,EE2    Pre Patient Position  Supine  -EE     Intra Patient Position  Standing  -EE     Post Patient Position  Sitting  -EE     Recorded by [RW] Trista Tirado, PTA [EE] Soha Brush PT [EE,KYUNG,EE2] Soha Brush PT (r) Sebastian Goncalves PT Student (t) Soha Brush PT (c)    Pain Assessment    Pain Assessment No/denies pain  -RW No/denies pain  -EE FLACC  -EE,KYUNG,EE2    Pain Location   Ankle   and shin  -EE,KYUNG,EE2    Pain Orientation   Left;Anterior  -EE,KYUNG,EE2    Pain Frequency   Constant/continuous  -EE,KYUNG,EE2    Effect of Pain on Daily Activities    Preventing mobility  -EE,KYUNG,EE2    Pain Intervention(s)   Ambulation/increased activity  -EE,KYUNG,EE2    Recorded by [RW] Trista Tirado PTA [EE] Soha Brush PT [EE,KYUNG,EE2] Soha Brush PT (r) Sebastian Goncalves, PT Student (t) Soha Brush PT (c)    Cognitive Assessment/Intervention    Current Cognitive/Communication Assessment functional  -RW functional  -EE functional  -EE,KYUNG,EE2    Orientation Status oriented x 4  -RW oriented x 4  -EE oriented x 4;required verbal cueing (specifiy in comments)   VC's for proper exercise performance  -EE,KYUNG,EE2    Follows Commands/Answers Questions 100% of the time;able to follow single-step instructions;needs cueing  -% of the time;able to follow single-step instructions;needs cueing;needs repetition  -% of the time;able to follow single-step instructions  -EE,KYUNG,EE2    Personal Safety mild impairment  -RW mild impairment  -EE mild impairment  -EE,KYUNG,EE2    Personal Safety Interventions fall prevention program maintained;gait belt;nonskid shoes/slippers when out of bed  -RW fall prevention program maintained;gait belt;muscle strengthening facilitated;nonskid shoes/slippers when out of bed;supervised activity  -EE muscle strengthening facilitated;fall prevention program maintained;supervised activity  -EE,KYUNG,EE2    Recorded by [RW] Trista Tirado PTA [EE] Soha Brush PT [EE,KYUNG,EE2] Soha Brush PT (r) Sebastian Goncalves, PT Student (t) Soha Brush PT (c)    Bed Mobility, Assessment/Treatment    Bed Mobility, Assistive Device bed rails;head of bed elevated  -RW bed rails;head of bed elevated  -EE     Bed Mob, Supine to Sit, [place] supervision required  -RW supervision required  -EE not tested  -EE,KYUNG,EE2    Bed Mob, Sit to Supine, [place] not tested   Pt up in chair  -RW not tested   up in chair  -EE     Bed Mobility, Impairments   strength decreased  -EE,KYUNG,EE2    Bed Mobility, Comment   Not attempted due to pt feeling weak and becoming SOB during exercise   -EE,KYUNG,EE2    Recorded by [RW] Trista Tirado PTA [EE] Soha Brush PT [EE,KYUNG,EE2] Soha Brush PT (r) Sebastian Goncalves, PT Student (t) Soha Brush PT (c)    Transfer Assessment/Treatment    Transfers, Sit-Stand [place] contact guard assist;minimum assist (75% patient effort);1 person + 1 person to manage equipment  -RW minimum assist (75% patient effort);verbal cues required  -EE not tested  -EE,KYUNG,EE2    Transfers, Stand-Sit [place] minimum assist (75% patient effort);1 person + 1 person to manage equipment  -RW minimum assist (75% patient effort);verbal cues required  -EE     Transfers, Sit-Stand-Sit, Assist Device rolling walker   rollator  -RW rolling walker;elevated surface   rollator  -EE     Transfer, Impairments strength decreased;impaired balance  -RW strength decreased;impaired balance  -EE     Transfer, Comment Verbal cueing required for safety  -RW verbal cues required for hand placement during transfer  -EE     Recorded by [RW] Trista Tirado PTA [EE] Soha Brush PT [EE,KYUNG,EE2] Soha Brush PT (r) Sebastian Goncalves, PT Student (t) Soha Brush PT (c)    Gait Assessment/Treatment    Gait, [place] Level minimum assist (75% patient effort);1 person + 1 person to manage equipment  -RW minimum assist (75% patient effort);verbal cues required  -EE     Gait, Assistive Device rolling walker  -RW rollator  -EE     Gait, Distance (Feet) 35  -RW 10  -EE     Gait, Gait Deviations forward flexed posture;bilateral:;claudio decreased;step length decreased  -RW forward flexed posture;claudio decreased;bilateral:;decreased heel strike;step length decreased;stride length decreased;narrow base   B knee flexion  -EE     Gait, Safety Issues step length decreased;supplemental O2   4L O2  -RW step length decreased;weight-shifting ability decreased;balance decreased during turns;supplemental O2  -EE     Gait, Impairments strength decreased;impaired balance  -RW strength decreased;impaired balance  -EE     Gait,  Comment Verbal cueing required for safety  -RW Distance limited by O2 line; will plan to bring tank at next visit  -EE     Recorded by [RW] Trista Tirado PTA [EE] Soha Brush PT     Motor Skills/Interventions    Additional Documentation  Balance Skills Training (Group)  -EE     Recorded by  [EE] Soha Brush PT     Therapy Exercises    Bilateral Lower Extremities AROM:;10 reps;sitting;ankle pumps/circles;LAQ;5 reps;hip flexion  -RW AROM:;10 reps;sitting;ankle pumps/circles;LAQ  -EE AROM:;supine;10 reps;ankle pumps/circles;heel slides;SLR  -EE,KYUNG,EE2    Recorded by [RW] Trista Tirado PTA [EE] Soha Brush PT [EE,KYUNG,EE2] Soha Brush PT (r) [place] Goncalves, PT Student (t) Soha Brush PT (c)    Positioning and Restraints    Pre-Treatment Position in bed  -RW in bed  -EE in bed  -EE,KYUNG,EE2    Post Treatment Position chair  -RW chair  -EE bed  -EE,KYUNG,EE2    In Bed   fowlers;notified nsg;call light within reach;encouraged to call for assist;with family/caregiver;with nsg;side rails up x2  -EE,KYUNG,EE2    In Chair sitting;call light within reach;encouraged to call for assist;with family/caregiver;notified nsg  -RW sitting;call light within reach;encouraged to call for assist;with family/caregiver;notified nsg  -EE     Recorded by [RW] Trista Tirado PTA [EE] Soha Brush PT [EE,KYUNG,EE2] Soha Brush PT (r) Sebastian Goncalves PT Student (t) Soha Brush PT (c)      User Key  (r) = Recorded By, (t) = Taken By, (c) = Cosigned By    Initials Name Effective Dates    EE Soha Brush PT 12/01/15 -     RW Trista Tirado PTA 04/06/16 -     KYUNG Goncalves PT Student 05/08/17 -                 IP PT Goals       06/11/17 1352          Bed Mobility PT LTG    Bed Mobility PT LTG, Date Established 06/11/17  -SP      Bed Mobility PT LTG, Time to Achieve 1 wk  -SP      Bed Mobility PT LTG, Activity Type all bed mobility  -SP      Bed Mobility PT LTG, [place] Level minimum assist (75% patient effort)  -SP      Transfer Training PT  LTG    Transfer Training PT LTG, Date Established 06/11/17  -SP      Transfer Training PT LTG, Time to Achieve 1 wk  -SP      Transfer Training PT LTG, Activity Type all transfers  -SP      Transfer Training PT LTG, [place] Level minimum assist (75% patient effort)  -SP      Gait Training PT LTG    Gait Training Goal PT LTG, Date Established 06/11/17  -SP      Gait Training Goal PT LTG, Time to Achieve 1 wk  -SP      Gait Training Goal PT LTG, [place] Level minimum assist (75% patient effort)  -SP      Gait Training Goal PT LTG, Assist Device walker, rolling  -SP      Gait Training Goal PT LTG, Distance to Achieve 75  -SP        User Key  (r) = Recorded By, (t) = Taken By, (c) = Cosigned By    Initials Name Provider Type    CORRY Padilla PT Physical Therapist          Physical Therapy Education     Title: PT OT SLP Therapies (Done)     Topic: Physical Therapy (Done)     Point: Mobility training (Done)    Learning Progress Summary    Learner Readiness Method Response Comment Documented by Status   Patient Acceptance TB,E,D NR,VU   06/14/17 1440 Done    Acceptance E,TB NR,VU  EE 06/13/17 0856 Done    Acceptance E VU,NR   06/12/17 0903 Done    Acceptance D,E NR,VU Ther ex, to help advance mobility.  06/11/17 1202 Done   Family Acceptance D,E NR,VU Ther ex, to help advance mobility.  06/11/17 1202 Done               Point: Home exercise program (Done)    Learning Progress Summary    Learner Readiness Method Response Comment Documented by Status   Patient Acceptance TB,E,D NR,VU  RW 06/14/17 1440 Done    Acceptance E,TB NR,VU  EE 06/13/17 0856 Done    Acceptance E VU,NR   06/12/17 0903 Done    Acceptance D,E NR,VU Ther ex, to help advance mobility.  06/11/17 1202 Done   Family Acceptance D,E NR,VU Ther ex, to help advance mobility.  06/11/17 1202 Done               Point: Body mechanics (Done)    Learning Progress Summary    Learner Readiness Method Response Comment Documented by Status    Patient Acceptance TB,E,D NR,VU  RW 06/14/17 1440 Done    Acceptance E,TB NR,VU  EE 06/13/17 0856 Done    Acceptance E VU,NR  KYUNG 06/12/17 0903 Done    Acceptance D,E NR,VU Ther ex, to help advance mobility.  06/11/17 1202 Done   Family Acceptance D,E NR,VU Ther ex, to help advance mobility.  06/11/17 1202 Done               Point: Precautions (Done)    Learning Progress Summary    Learner Readiness Method Response Comment Documented by Status   Patient Acceptance TB,E,D NR,VU  RW 06/14/17 1440 Done    Acceptance E,TB NR,VU  EE 06/13/17 0856 Done    Acceptance E VU,NR   06/12/17 0903 Done    Acceptance D,E NR,VU Ther ex, to help advance mobility.  06/11/17 1202 Done   Family Acceptance D,E NR,VU Ther ex, to help advance mobility.  06/11/17 1202 Done                      User Key     Initials Effective Dates Name Provider Type Discipline     12/01/15 -  Soha Brush, PT Physical Therapist PT     04/06/16 -  Trista Tirado, PTA Physical Therapy Assistant PT     01/09/17 -  Maria Eugenia Padilla, PT Physical Therapist PT     05/08/17 -  Sebastian Goncalves, PT Student PT Student PT                    PT Recommendation and Plan  Anticipated Discharge Disposition: skilled nursing facility  Planned Therapy Interventions: bed mobility training, strengthening, transfer training, gait training  PT Frequency: daily  Plan of Care Review  Plan Of Care Reviewed With: patient  Outcome Summary/Follow up Plan: Pt increased mobility today. Increased fatigue w/ exercises and ambulation which limited further mobility.           Outcome Measures       06/14/17 1400 06/13/17 0800 06/12/17 0900    How much help from another person do you currently need...    Turning from your back to your side while in flat bed without using bedrails? 4  -RW 4  -EE 2  -EE (r) KYUNG (t) EE (c)    Moving from lying on back to sitting on the side of a flat bed without bedrails? 3  -RW 3  -EE 2  -EE (r) KYUNG (t) EE (c)    Moving to and from a bed to a chair  (including a wheelchair)? 3  -RW 3  -EE 2  -EE (r) KYUNG (t) EE (c)    Standing up from a chair using your arms (e.g., wheelchair, bedside chair)? 3  -RW 3  -EE 2  -EE (r) KYUNG (t) EE (c)    Climbing 3-5 steps with a railing? 2  -RW 2  -EE 1  -EE (r) KYUNG (t) EE (c)    To walk in hospital room? 3  -RW 3  -EE 2  -EE (r) KYUNG (t) EE (c)    AM-PAC 6 Clicks Score 18  -RW 18  -EE 11  -EE (r) KYUNG (t)    Functional Assessment    Outcome Measure Options AM-PAC 6 Clicks Basic Mobility (PT)  -RW AM-PAC 6 Clicks Basic Mobility (PT)  -EE       User Key  (r) = Recorded By, (t) = Taken By, (c) = Cosigned By    Initials Name Provider Type    THERESA Brush, PT Physical Therapist    JIGAR Tirado PTA Physical Therapy Assistant    KYUNG Goncalves, PT Student PT Student           Time Calculation:         PT Charges       06/14/17 1427 06/14/17 0827       Time Calculation    Start Time 1425  -RW      Stop Time 1438  -RW      Time Calculation (min) 13 min  -RW      PT Received On 06/14/17  -      PT - Next Appointment 06/15/17  -RW 06/14/17  -       User Key  (r) = Recorded By, (t) = Taken By, (c) = Cosigned By    Initials Name Provider Type     Trista Tirado PTA Physical Therapy Assistant          Therapy Charges for Today     Code Description Service Date Service Provider Modifiers Qty    04053088113 HC PT THER PROC EA 15 MIN 6/14/2017 Trista Tirado PTA GP 1    [id number] HC PT THER SUPP EA 15 MIN 6/14/2017 Trista Tirado PTA GP 1          PT G-Codes  Outcome Measure Options: AM-PAC 6 Clicks Basic Mobility (PT)    Trista Tirado PTA  6/14/2017

## 2017-06-14 NOTE — CONSULTS
"Purpose of the visit was to evaluate for: goals of care/advanced care planning and comfort care. Spoke with MD and RN as well as patient and family and discussed palliative care, goals of care, care options and [facility].      Assessment:  Patient is palliative care appropriate for inpatient care given (list diagnosis/symptoms):  Patient has a history of lung cancer with metastasis to bone and brain, renal cell carcinoma, COPD, coronary artery disease, chronic kidney disease, hypertension, MRSA and VRE pneumonia, and atrial fibrillation. Patient admitted for bilateral pneumonia and respiratory distress.  Patient c/o pain in chest with cough, pain in \"kidney\", and is short of breath at rest.      Recommendations/Plan: transfer to [facility] for palliative care.    Other Comments: Lengthy discussion with patient and wife regarding goals of care, symptom management, potential discharge options, definition of palliative care, and hospice services.  Patient stated \"it takes too long to die\", and \"I don't want to suffer\".  Patient voiced concern over being able to control his pain and provide him dignity. Wife voiced support for any decision patient made. Patient stated many times \" I don't know what to do\".  Patient would like to talk to Dr. Murrell prior to making a decision to transfer to  for palliative care. Provided informational brochure and psychosocial support.    "

## 2017-06-14 NOTE — PROGRESS NOTES
"[facility] PULMONARY CARE           LOS: 5 days   Patient Care Team:  Elmo Aguilera Jr., MD as PCP - General (Family Medicine)  Som Garrett MD as Consulting Physician (Cardiology)  Joo Christensen Jr., MD as Consulting Physician (Urology)  Katelynn Quan MD as Consulting Physician (Radiation Oncology)  Vahid Garcia MD as Consulting Physician (Hematology and Oncology)  Joo Marin MD as Referring Physician (Emergency Medicine)  Ochoa Marks MD as Consulting Physician (Pulmonary Disease)    Chief Complaint: Bilateral pneumonia with acute on chronic respiratory failure in the setting of metastatic renal cell carcinoma    Interval History:   Feels the same since yesterday.  He remains on oxygen at 4 L/m.  He continues to have excessive secretions.  He has not been able to get out of the bed and walk outside the room without help.  Even with help he experiences significant shortness of breath    REVIEW OF SYSTEMS:   Constitutional: No fever or chills  Cardiovascular: Denies chest pain, palpitation or swelling.  Gastrointestinal: Tolerating diet.  No nausea or vomiting      Vital Signs  Temp:  [97.7 °F (36.5 °C)-98.6 °F (37 °C)] 97.7 °F (36.5 °C)  Heart Rate:  [] 88  Resp:  [18-20] 20  BP: (114-147)/(70-98) 114/70    Intake/Output Summary (Last 24 hours) at 06/14/17 1742  Last data filed at 06/14/17 1711   Gross per 24 hour   Intake              800 ml   Output             1525 ml   Net             -725 ml     Flowsheet Rows         First Filed Value    Admission Height  74\" (188 cm) Documented at 06/09/2017 1232    Admission Weight  230 lb (104 kg) Documented at 06/09/2017 1232          Physical Exam:   General Appearance:    Not in acute distress    Lungs:     Diminished breath sounds with crackles bilaterally on the bases     Heart:    Regular rhythm and normal rate, normal S1 and S2, no            murmur, no gallop, no rub, no click   Chest Wall:    No abnormalities " observed   Abdomen:     Normal bowel sounds, no masses, no organomegaly, soft        non-tender, non-distended, no guarding, no rebound                tenderness   Extremities:   Moves all extremities well, no edema, no cyanosis, no             redness     Results Review:          Results from last 7 days  Lab Units 06/14/17  0513 06/12/17  0509 06/11/17  0709   SODIUM mmol/L 131* 134* 131*   POTASSIUM mmol/L 4.3 4.5 4.8   CHLORIDE mmol/L 92* 92* 90*   TOTAL CO2 mmol/L 30.3* 33.3* 33.9*   BUN mg/dL 15 21 26*   CREATININE mg/dL 1.25 1.48* 1.51*   GLUCOSE mg/dL 118* 106* 119*   CALCIUM mg/dL 7.4* 6.9* 7.3*       Results from last 7 days  Lab Units 06/09/17  1250   TROPONIN T ng/mL <0.010       Results from last 7 days  Lab Units 06/12/17  0509 06/11/17  0709 06/10/17  0200   WBC 10*3/mm3 8.43 9.67 10.87*   HEMOGLOBIN g/dL 9.6* 9.6* 9.2*   HEMATOCRIT % 30.8* 31.7* 30.1*   PLATELETS 10*3/mm3 215 243 200       Results from last 7 days  Lab Units 06/09/17  1250   INR  1.36*                   Results from last 7 days  Lab Units 06/10/17  0346   PH, ARTERIAL pH units 7.328*   PO2 ART mm Hg 89.7   PCO2, ARTERIAL mm Hg 75.5*   HCO3 ART mmol/L 39.6*       I reviewed the patient's new clinical results.  I personally viewed and interpreted the patient's CXR        Medication Review:     apixaban 5 mg Oral BID   bisacodyl 10 mg Rectal Daily   diltiaZEM  mg Oral Q24H   finasteride 5 mg Oral Daily   folic acid 400 mcg Oral Daily   furosemide 40 mg Oral Daily   ipratropium-albuterol 3 mL Nebulization 4x Daily - RT   piperacillin-tazobactam 3.375 g Intravenous Q8H   And      Linezolid 600 mg Intravenous Q12H   metoprolol tartrate 50 mg Oral BID   pantoprazole 40 mg Oral BID   polyethylene glycol 17 g Oral Nightly   potassium chloride 10 mEq Oral Daily   tamsulosin 0.4 mg Oral Daily     Diagnostic imaging:  I personally and independently reviewed the CT of the chest performed on 6/9/17:  Right lower lobe consolidation with air  bronchogram; bilateral pleural effusion, R >L; slightly enlarged mediastinal adenopathies, mainly the subcarinal         ASSESSMENT:   1) 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 fib  -History of left atrial appendage clot on anticoagulation  -Coronary artery disease  -Chronic kidney disease  -Pulmonary hypertension with history of PE (RVSP = 40 on echo dated 11/10/2016)      PLAN:  Had a long discussion with the patient and his wife.  I also discussed his issues with Ilsa, the nurse practitioner for palliative care.  Patient has metastatic lung cancer and therefore his expected survival is less than 6 month with therapy, not taking into consideration his other medical problems which include severe COPD and respiratory failure.  His life expectancy likely couple of month only.  I offered full therapy versus comfort care with the patient.  He was very interested in comfort care but was very hesitant and concerned about the process of dying with take too long.  I explained that we will make him as comfortable as possible.  I also explained that he can go home with hospice.  I offered to continue antibiotics for him but he declined.  Stop all his medications and transfer him to the palliative care unit.    I spent more than 30 minutes counseling and discussing with the patient palliative care.    Rachel Murrell MD  06/14/17  5:42 PM

## 2017-06-14 NOTE — SIGNIFICANT NOTE
06/14/17 0827   Rehab Treatment   Discipline physical therapy assistant   Treatment Not Performed other (see comments)  (Noted plans for palliative care consult. Will follow-up in pm.)   Recommendation   PT - Next Appointment 06/14/17

## 2017-06-14 NOTE — PLAN OF CARE
Problem: Patient Care Overview (Adult)  Goal: Plan of Care Review  Outcome: Ongoing (interventions implemented as appropriate)    06/13/17 2028 06/14/17 0336   Coping/Psychosocial Response Interventions   Plan Of Care Reviewed With patient;spouse --    Outcome Evaluation   Outcome Summary/Follow up Plan --  Pt has been weaned to 5L O2 N/C, maintaining O2 sats >90%. Continues to have congested cough productive of thick, tan sputum. C/o pain in R upper chest near axillary area, worse with cough, deep breaths, movement. Pt describes this pain as muscular. Tried a dose of PO pain med, no relief, heat applied, which helped a little, another PO pain med given, no relief-MD called and order for IV Morphine ordered. Palliative RN consulted-to see. Pt has been urinating independently since F/C removed. VSS. Continue to monitor    Patient Care Overview   Progress --  improving       Goal: Adult Individualization and Mutuality  Outcome: Ongoing (interventions implemented as appropriate)    Problem: Fall Risk (Adult)  Goal: Absence of Falls  Outcome: Ongoing (interventions implemented as appropriate)    06/14/17 0336   Fall Risk (Adult)   Absence of Falls achieves outcome  (SAFETY MAINTAINED)         Problem: Skin Integrity Impairment, Risk/Actual (Adult)  Goal: Skin Integrity/Wound Healing  Outcome: Ongoing (interventions implemented as appropriate)    06/14/17 0336   Skin Integrity Impairment, Risk/Actual (Adult)   Skin Integrity/Wound Healing making progress toward outcome  (TURN PT FREQUENTLY-TREAT WITH BARRIER CREAM)         Problem: Respiratory Insufficiency (Adult)  Goal: Acid/Base Balance  Outcome: Ongoing (interventions implemented as appropriate)    06/14/17 0336   Respiratory Insufficiency (Adult)   Acid/Base Balance making progress toward outcome       Goal: Effective Ventilation  Outcome: Ongoing (interventions implemented as appropriate)    06/14/17 0336   Respiratory Insufficiency (Adult)   Effective Ventilation  making progress toward outcome  (PT MAINTAINING O2 SATS >90% ON 5L O2 N/C)

## 2017-06-15 NOTE — PLAN OF CARE
Problem: Patient Care Overview (Adult)  Goal: Plan of Care Review  Outcome: Ongoing (interventions implemented as appropriate)    06/15/17 3228   Coping/Psychosocial Response Interventions   Plan Of Care Reviewed With patient;spouse   Outcome Evaluation   Outcome Summary/Follow up Plan enema given this morning per patient request with very minmal results. mclean placed during bath due to retention for comfort measures. patient made a rapid decline this afternoon since this morning. patient's breathing has changed and his extremities are very dusky. patient was medicated x2 for SOA and restlessness. wife and daughter at bedside. will continue to monitor patient and treat according to comfort orders.   Patient Care Overview   Progress declining       Goal: Adult Individualization and Mutuality  Outcome: Ongoing (interventions implemented as appropriate)    Problem: Fall Risk (Adult)  Goal: Absence of Falls  Outcome: Ongoing (interventions implemented as appropriate)    Problem: Skin Integrity Impairment, Risk/Actual (Adult)  Goal: Skin Integrity/Wound Healing  Outcome: Ongoing (interventions implemented as appropriate)    Problem: Dying Patient, Actively (Adult)  Goal: Comfort/Pain Control  Outcome: Ongoing (interventions implemented as appropriate)  Goal: Dying Process, Peace and Dignity  Outcome: Ongoing (interventions implemented as appropriate)

## 2017-06-15 NOTE — PLAN OF CARE
Problem: Patient Care Overview (Adult)  Goal: Plan of Care Review  Outcome: Ongoing (interventions implemented as appropriate)    06/15/17 0553   Coping/Psychosocial Response Interventions   Plan Of Care Reviewed With patient;spouse   Outcome Evaluation   Outcome Summary/Follow up Plan Patient transferred for palliative care last night. Pt with bilateral pneumonia and metastatic cancer. Having pain with coughing in right axilla/chest region, also with some soa. Morphine given. VSS. Wife remains at bedside. Many concerns about dying process.    Patient Care Overview   Progress no change       Goal: Adult Individualization and Mutuality  Outcome: Ongoing (interventions implemented as appropriate)  Goal: Discharge Needs Assessment  Outcome: Ongoing (interventions implemented as appropriate)    Problem: Fall Risk (Adult)  Goal: Absence of Falls  Outcome: Ongoing (interventions implemented as appropriate)    Problem: Skin Integrity Impairment, Risk/Actual (Adult)  Goal: Skin Integrity/Wound Healing  Outcome: Ongoing (interventions implemented as appropriate)    Problem: Dying Patient, Actively (Adult)  Goal: Identify Related Risk Factors and Signs and Symptoms  Outcome: Outcome(s) achieved Date Met:  06/15/17  Goal: Comfort/Pain Control  Outcome: Ongoing (interventions implemented as appropriate)  Goal: Dying Process, Peace and Dignity  Outcome: Ongoing (interventions implemented as appropriate)

## 2017-06-15 NOTE — PROGRESS NOTES
"[facility]           LOS: 6 days   Patient Care Team:  Elmo Aguilera Jr., MD as PCP - General (Family Medicine)  Som Garrett MD as Consulting Physician (Cardiology)  Joo Christensen Jr., MD as Consulting Physician (Urology)  Katelynn Quan MD as Consulting Physician (Radiation Oncology)  Vahid Garcia MD as Consulting Physician (Hematology and Oncology)  Joo Marin MD as Referring Physician (Emergency Medicine)  Ochoa Marks MD as Consulting Physician (Pulmonary Disease)    Chief Complaint: Bilateral pneumonia with acute on chronic respiratory failure in the setting of metastatic renal cell carcinoma    Interval History:   Had some constipation earlier today for which laxative was administered.  He became short of breath at noon and rapidly after flopped worsening level of consciousness.         Vital Signs  Temp:  [97.9 °F (36.6 °C)-98.8 °F (37.1 °C)] 98.8 °F (37.1 °C)  Heart Rate:  [105-118] 118  Resp:  [18-20] 20  BP: (115-137)/(69-70) 137/69    Intake/Output Summary (Last 24 hours) at 06/15/17 1908  Last data filed at 06/15/17 1718   Gross per 24 hour   Intake              120 ml   Output              750 ml   Net             -630 ml     Flowsheet Rows         First Filed Value    Admission Height  74\" (188 cm) Documented at 06/09/2017 1232    Admission Weight  230 lb (104 kg) Documented at 06/09/2017 1232          Physical Exam:   General Appearance:    Not in acute distress    Lungs:     rhonchi    Neuro:    Lethargic/Obtunded                 Results Review:          Results from last 7 days  Lab Units 06/14/17  0513 06/12/17  0509 06/11/17  0709   SODIUM mmol/L 131* 134* 131*   POTASSIUM mmol/L 4.3 4.5 4.8   CHLORIDE mmol/L 92* 92* 90*   TOTAL CO2 mmol/L 30.3* 33.3* 33.9*   BUN mg/dL 15 21 26*   CREATININE mg/dL 1.25 1.48* 1.51*   GLUCOSE mg/dL 118* 106* 119*   CALCIUM mg/dL 7.4* 6.9* 7.3*       Results from last 7 days  Lab Units 06/09/17  1250   TROPONIN " T ng/mL <0.010       Results from last 7 days  Lab Units 06/12/17  0509 06/11/17  0709 06/10/17  0200   WBC 10*3/mm3 8.43 9.67 10.87*   HEMOGLOBIN g/dL 9.6* 9.6* 9.2*   HEMATOCRIT % 30.8* 31.7* 30.1*   PLATELETS 10*3/mm3 215 243 200       Results from last 7 days  Lab Units 06/09/17  1250   INR  1.36*                   Results from last 7 days  Lab Units 06/10/17  0346   PH, ARTERIAL pH units 7.328*   PO2 ART mm Hg 89.7   PCO2, ARTERIAL mm Hg 75.5*   HCO3 ART mmol/L 39.6*       I reviewed the patient's new clinical results.  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 fib  -History of left atrial appendage clot on anticoagulation  -Coronary artery disease  -Chronic kidney disease  -Pulmonary hypertension with history of PE (RVSP = 40 on echo dated 11/10/2016)      PLAN:  Patient deteriorated quickly today.  He was awake until noon when he became short of breath then altered.  Seems like his survival it's much less than what we thought.  He appears to be comfortable now.  Family is at bedside.  I talked to the wife and daughter and addressed all their questions.     Rachel Murrell MD  06/15/17  7:08 PM

## 2017-06-15 NOTE — PROGRESS NOTES
Discharge Planning Assessment  [facility]     Patient Name: Alexander S Mendelsberg  MRN: [id number]  Today's Date: 6/15/2017    Admit Date: 6/9/2017          Discharge Needs Assessment     None            Discharge Plan       06/15/17 1038    Case Management/Social Work Plan    Additional Comments The patient transferred to [facility] from [address] [place] [address] on 6/14/17 @ 18:40. The patient is palliative. CCP will continue to follow for any needs that may arise. IKE Cartagena, CCP.         Discharge Placement     No information found                Demographic Summary     None            Functional Status     None            Psychosocial     None            Abuse/Neglect     None            Legal     None            Substance Abuse     None            Patient Forms     None          Leighann Ferrara, RN

## 2017-06-16 NOTE — PROGRESS NOTES
Discharge Planning Assessment  [facility]     Patient Name: Alexander S Mendelsberg  MRN: [id number]  Today's Date: 2017    Admit Date: 2017          Discharge Needs Assessment     None            Discharge Plan       17 0933    Final Note    Final Note The patient  on 17 @ 06:10. B.KIE Ferrara, CCP.         Discharge Placement     No information found        Expected Discharge Date and Time     Expected Discharge Date Expected Discharge Time    2017               Demographic Summary     None            Functional Status     None            Psychosocial     None            Abuse/Neglect     None            Legal     None            Substance Abuse     None            Patient Forms     None          Leighann Ferrara, RN

## 2017-06-16 NOTE — DISCHARGE SUMMARY
Admission date: 17  Discharge date: 17  Discharged condition: Patient is .    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 fib  -History of left atrial appendage clot on anticoagulation  -Coronary artery disease  -Chronic kidney disease  -Pulmonary hypertension with history of PE (RVSP = 40 on echo dated 11/10/2016)    History of present illness:  This is a 81-year-old male patient known to have chronic respiratory failure secondary to COPD and lung cancer, on oxygen at 4 L/m.  He was admitted by Dr. Marks on 17 for worsening respiratory failure.  He had a prior history of pneumonia with MRSA and therefore he was started on broad-spectrum antibiotic therapy.  His CT of the chest showed bilateral pulmonary infiltrates consistent with pneumonia.    Course in the hospital:  As above, patient was treated with broad-spectrum antibiotics.  He slightly improved however he continued to experience significant shortness of breath on minimal exertion.  He expressed wishes to go for comfort care.  We established several meetings to discuss his prognosis.  He does have metastatic lung cancer and therefore his expected survival is no more than 6 month, not taking into consideration his underlying medical problems which include severe COPD, chronic respiratory failure, and now pneumonia.   Patient was transferred to the palliative care unit.  He passed away comfortably.  Family was at bedside.

## 2018-11-24 NOTE — PLAN OF CARE
Problem: Inpatient Physical Therapy  Goal: Bed Mobility Goal LTG- PT    06/11/17 1352   Bed Mobility PT LTG   Bed Mobility PT LTG, Date Established 06/11/17   Bed Mobility PT LTG, Time to Achieve 1 wk   Bed Mobility PT LTG, Activity Type all bed mobility   Bed Mobility PT LTG, [place] Level minimum assist (75% patient effort)       Goal: Transfer Training Goal 1 LTG- PT    06/11/17 1352   Transfer Training PT LTG   Transfer Training PT LTG, Date Established 06/11/17   Transfer Training PT LTG, Time to Achieve 1 wk   Transfer Training PT LTG, Activity Type all transfers   Transfer Training PT LTG, [place] Level minimum assist (75% patient effort)       Goal: Gait Training Goal LTG- PT    06/11/17 1352   Gait Training PT LTG   Gait Training Goal PT LTG, Date Established 06/11/17   Gait Training Goal PT LTG, Time to Achieve 1 wk   Gait Training Goal PT LTG, [place] Level minimum assist (75% patient effort)   Gait Training Goal PT LTG, Assist Device walker, rolling   Gait Training Goal PT LTG, Distanc       06/11/17 1352   Gait Training PT LTG   Gait Training Goal PT LTG, Date Established 06/11/17   Gait Training Goal PT LTG, Time to Achieve 1 wk   Gait Training Goal PT LTG, [place] Level minimum assist (75% patient effort)   Gait Training Goal PT LTG, Assist Device walker, rolling   Gait Training Goal PT LTG, Distance to Achieve 75   e to Achieve 75            None

## 2023-03-14 NOTE — PROGRESS NOTES
DIAGNOSIS and REASON FOR CONSULTATION: Malignant neoplasm of overlapping sites of left lung; widely metastatic with painful right chest wall mass    Referring Provider:  Vahid Garcia MD  Patient Care Team:  Sergio Green MD as PCP - General (Family Medicine)  Sergio Green MD as Referring Physician (Family Medicine)  Som Garrett MD as Consulting Physician (Cardiology)  Vahid Garcia MD as Consulting Physician (Hematology and Oncology)  Joo Christensen Jr., MD as Consulting Physician (Urology)  Katelynn Quan MD as Consulting Physician (Radiation Oncology)    CHIEF COMPLAINT:  Malignant neoplasm of overlapping sites of left lung and painful right chest wall mass    HISTORY OF PRESENT ILLNESS:  The patient is a 81 y.o. 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      In short, he had a history of a right sided nephrectomy for renal cell carcinoma.  On routine surveillance, he was found to have an abnormality in the bladder and underwent TURBT on October 8, 2013.  He then developed recurrent disease and was treated with combination chemotherapy and radiation therapy in December 2013 and January 2014 along with carboplatin and Taxol.    He underwent routine surveillance CAT scans of the abdomen and pelvis on October 12, 2016 which revealed several noncalcified nodules in the lung bases with the 2 largest on the left measuring 2.2 and 2.3 cm in size.  These were new since his previous scan of August 1, 2014.  CAT scan of the chest obtained for a CT-guided biopsy on November 10, 2016 showed a suspicious lesion in the left lower lobe measuring 2.9 x 2.4 cm, numerous additional smaller nodules scattered throughout both lungs, a filling defect noted within the left atrial appendage measuring 5.2 x 2.2 cm consistent with  Right thumb with area of what appears to be infection around the cuticle. Has been bothering him for about a week. clot, slow progression of the esophageal diverticulum noted back to 2006, a number of small sclerotic foci in the thoracic spine and sternum consistent with metastatic disease with one of the largest measuring 2.4 cm at T11.  The pathology revealed a primary lung adenocarcinoma.    He underwent an MRI of the brain on November 15, 2016 which showed a solitary metastatic deposit in the right occipital lobe measuring 6 x 6 x 5 mm in size with adjacent vasogenic edema.  No other metastatic lesions were identified.  Abnormal signal was also noted involving the C3 vertebral body consistent with metastatic disease.      PET scan on November 16, 2016 showed intense hypermetabolism within the 3 cm left lower lobe lung mass with an SUV of 11.3, multiple hypermetabolic nodules noted bilaterally, hypermetabolic lymphadenopathy in the right and left paratracheal regions, AP window and bilateral lindsey, multiple hypermetabolic bone lesions scattered throughout the skeleton with a lesion at C3 measuring 2 cm with an SUV of 7.6, a lesion at T11 where there is a lytic mass measuring 5 cm with an SUV of 11.0, ribs, left clavicle, left glenoid, right iliac and right femoral head, neck and proximal shaft and a long a TURP defect within the prostate.    He underwent stereotactic treatment of the brain lesion receiving a single treatment on December 8, 2016.  He tolerated this well and had no significant side effects from the treatment. He was treated systemically with Taxol.    His most recent CT scans of the chest, abdomen and pelvis on January 19, 2017 showed progression of the pulmonary metastatic disease with increased lung nodules and lymphadenopathy, development of two liver metastasis, multifocal bone metastasis and MRI of the brain on January 25, 2017 showed stability of the 6 mm treated brain metastasis and MRI of the cervical spine on the same date showed stability of the C3 metastasis.  The chest CT specifically showed the  right upper lobe mass increased to 2.5 x 2.1 cm, an inferior right upper lobe nodule measuring 1.1 cm, left lower lobe mass measuring 3.2 x 2.4 cm, right middle lobe mass measuring 2.5 x 2.3 cm and several smaller lung nodules.    Given the significant progression, he was switched to carboplatin weekly. He recently complained of pain in the left posterior chest wall. He has developed a palpable fullness in the area of tenderness as well. The pain is fairly well controlled with tylenol but is bothersome most of the time. He is referred for consideration of palliative radiation therapy to this lesion.    Past Medical History: he  has a past medical history of Aftercare following surgery; Anemia; Atrial fibrillation; Benign prostatic hypertrophy; CAD (coronary artery disease); Chronic kidney disease; Fatigue; H/O thrombocytopenia; H/O transfusion of packed red blood cells; H/O: pneumonia (2013); History of gross hematuria; Hyperlipidemia; Hypertension; Pulmonary embolism; Pulmonary hypertension; Renal insufficiency syndrome; Transitional cell carcinoma of kidney (2013); and Urothelial carcinoma.    Past Surgical History:  he has a past surgical history that includes Bladder surgery; Kidney surgery; Nephrectomy (Right, 01/2013); Cholecystectomy; and Cataract Extraction.    Meds:    Current Outpatient Prescriptions:   •  apixaban (ELIQUIS) 5 MG tablet tablet, Take 1 tablet by mouth 2 (Two) Times a Day., Disp: 60 tablet, Rfl: 5  •  aspirin 81 MG tablet, Take 81 mg by mouth Daily., Disp: , Rfl:   •  Calcium-Vitamin D 600-200 MG-UNIT per tablet, Take 1 tablet by mouth 2 (Two) Times a Day., Disp: , Rfl:   •  CARTIA  MG 24 hr capsule, TAKE 1 CAPSULE EVERY DAY, Disp: 90 capsule, Rfl: 1  •  diltiazem (TIAZAC) 300 MG 24 hr capsule, Take 300 mg by mouth Daily., Disp: , Rfl:   •  finasteride (PROSCAR) 5 MG tablet, TAKE 1 TABLET DAILY, Disp: 90 tablet, Rfl: 1  •  folic acid (FOLVITE) 400 MCG tablet, Take 400 mcg by mouth  Daily., Disp: , Rfl:   •  furosemide (LASIX) 40 MG tablet, TAKE 1 TABLET EVERY DAY, Disp: 90 tablet, Rfl: 1  •  Magnesium Hydroxide (MILK OF MAGNESIA PO), Take  by mouth As Needed., Disp: , Rfl:   •  metoprolol tartrate (LOPRESSOR) 50 MG tablet, Take 50 mg by mouth 2 (two) times a day., Disp: , Rfl:   •  Multiple Vitamins-Minerals (MULTI COMPLETE) capsule, Take 1 tablet by mouth Daily., Disp: , Rfl:   •  Polyethylene Glycol 3350 (MIRALAX PO), Take 17 Units by mouth Every Night., Disp: , Rfl:   •  potassium chloride (K-DUR,KLOR-CON) 10 MEQ CR tablet, TAKE 1 TABLET EVERY DAY (SUBSTITUTED FOR  K-DUR), Disp: 90 tablet, Rfl: 1  •  predniSONE (DELTASONE) 10 MG tablet, Take 2 tablets by mouth Daily. Start 2 days after each chemo, take it in the morning, and for 3 days only, Disp: 20 tablet, Rfl: 1  •  Probiotic capsule, Take 1 capsule by mouth 2 (Two) Times a Day., Disp: , Rfl:   •  Pyridoxine HCl (VITAMIN B-6 PO), Take 100 mg by mouth Daily., Disp: , Rfl:   •  tamsulosin (FLOMAX) 0.4 MG capsule 24 hr capsule, TAKE 1 CAPSULE EVERY DAY, Disp: 90 capsule, Rfl: 0  •  vitamin B-12 (CYANOCOBALAMIN) 1000 MCG tablet, Take 1,000 mcg by mouth Daily., Disp: , Rfl:   •  vitamin C (ASCORBIC ACID) 500 MG tablet, Take 500 mg by mouth Every Night., Disp: , Rfl:   •  vitamin E 1000 UNIT capsule, Take 400 Units by mouth Daily., Disp: , Rfl:     Allergies:  No Known Allergies    Family History:  his family history includes Cancer in his father; Heart disease in his brother.    Social History:  he  reports that he has quit smoking. His smoking use included Cigarettes. He has a 15.00 pack-year smoking history. He has quit using smokeless tobacco. He reports that he drinks alcohol. He reports that he does not use illicit drugs.    Pertinent Findings on   Review of Systems   Constitutional: Negative for appetite change, chills, diaphoresis, fatigue, fever and unexpected weight change.   HENT:   Negative for hearing loss, lump/mass, mouth sores,  nosebleeds, sore throat, tinnitus, trouble swallowing and voice change.    Eyes: Negative for eye problems.   Respiratory: Negative for chest tightness, cough, dizziness on exertion, hemoptysis, shortness of breath and wheezing.         On home oxygen   Cardiovascular: Negative for chest pain, leg swelling and palpitations.   Gastrointestinal: Positive for constipation. Negative for abdominal distention, abdominal pain, blood in stool, diarrhea, nausea, rectal pain and vomiting.   Endocrine: Negative for hot flashes.   Genitourinary: Negative for bladder incontinence, difficulty urinating, dysuria, frequency, hematuria, nocturia and pelvic pain.    Musculoskeletal: Positive for back pain. Negative for arthralgias, flank pain, gait problem, myalgias, neck pain and neck stiffness.   Skin: Negative for itching and rash.   Neurological: Negative for dizziness, extremity weakness, gait problem, headaches, light-headedness, numbness, seizures and speech difficulty.   Hematological: Negative for adenopathy. Does not bruise/bleed easily.   Psychiatric/Behavioral: Negative for confusion, decreased concentration, depression, sleep disturbance and suicidal ideas. The patient is not nervous/anxious.    :  Vitals:    02/02/17 0939   BP: 137/85   Pulse: 84   Resp: 16   Temp: 96.6 °F (35.9 °C)   TempSrc: Oral   SpO2: 92%  Comment: 2 lnasal cannula   Weight: 272 lb (123 kg)   PainSc: 0-No pain       Performance Status: (2) Ambulatory and capable of self care, unable to carry out work activity, up and about > 50% or waking hours    Pertinent Findings on:  Physical Exam   Constitutional: He is oriented to person, place, and time. He appears well-developed and well-nourished.   HENT:   Head: Normocephalic and atraumatic.   Nasal cannula in place   Eyes: EOM are normal.   Neck: Normal range of motion.   Pulmonary/Chest: Effort normal.           Nasal cannula in place; palpable fullness in left posterior chest, just medial to tip of  scapula   Abdominal: Soft.   Musculoskeletal: Normal range of motion.   Mild tenderness to percussion over low back. No pain in c spine region   Neurological: He is alert and oriented to person, place, and time.   Skin: Skin is warm and dry.   Psychiatric: He has a normal mood and affect. His behavior is normal. Judgment and thought content normal.   Vitals reviewed.      Assessment: Adenocarcinoma of the lung with solitary brain mets and bony mets, now with progressive and painful left posterior chest wall mass    Plan:   We reviewed today the details of his recent imaging studies and the clinical extent of the disease and all questions were answered in that regard. He is currently having mild but fairly constant pain in the left posterior chest wall region and he is anxious to have the area treated.     We discussed a course of palliative radiation therapy aimed at the left posterior lung/chest wall lesion, delivering 3000 - 3300 cGy in 13 treatments over 2 and 1/2 weeks. We discussed the possible irritation of the skin and fatigue it may cause. I would expect minimal side effects beyond those.    We were able to begin the above process today and hopefully will be able to start early next week. He will continue on with his weekly chemotherapy during the radiation therapy as well.    I spent over 40 minutes face-to-face with the patient today and of that time, 25 minutes were spent counseling and coordinating care.